# Patient Record
Sex: FEMALE | Race: BLACK OR AFRICAN AMERICAN | NOT HISPANIC OR LATINO | Employment: OTHER | URBAN - METROPOLITAN AREA
[De-identification: names, ages, dates, MRNs, and addresses within clinical notes are randomized per-mention and may not be internally consistent; named-entity substitution may affect disease eponyms.]

---

## 2017-06-14 ENCOUNTER — HOSPITAL ENCOUNTER (EMERGENCY)
Facility: HOSPITAL | Age: 72
Discharge: HOME | End: 2017-06-14
Attending: EMERGENCY MEDICINE
Payer: MEDICARE

## 2017-06-14 VITALS
DIASTOLIC BLOOD PRESSURE: 69 MMHG | WEIGHT: 270 LBS | HEIGHT: 65 IN | HEART RATE: 81 BPM | SYSTOLIC BLOOD PRESSURE: 140 MMHG | OXYGEN SATURATION: 98 % | TEMPERATURE: 98 F | BODY MASS INDEX: 44.98 KG/M2 | RESPIRATION RATE: 18 BRPM

## 2017-06-14 DIAGNOSIS — R52 PAIN: ICD-10-CM

## 2017-06-14 DIAGNOSIS — M17.12 OSTEOARTHRITIS OF LEFT KNEE, UNSPECIFIED OSTEOARTHRITIS TYPE: Primary | ICD-10-CM

## 2017-06-14 PROCEDURE — 25000003 PHARM REV CODE 250: Performed by: EMERGENCY MEDICINE

## 2017-06-14 PROCEDURE — 99283 EMERGENCY DEPT VISIT LOW MDM: CPT

## 2017-06-14 RX ORDER — HYDROCODONE BITARTRATE AND ACETAMINOPHEN 10; 325 MG/1; MG/1
1 TABLET ORAL
Status: COMPLETED | OUTPATIENT
Start: 2017-06-14 | End: 2017-06-14

## 2017-06-14 RX ORDER — TRAMADOL HYDROCHLORIDE 50 MG/1
50 TABLET ORAL EVERY 6 HOURS PRN
Qty: 12 TABLET | Refills: 0 | Status: SHIPPED | OUTPATIENT
Start: 2017-06-14 | End: 2017-06-24

## 2017-06-14 RX ORDER — PROMETHAZINE HYDROCHLORIDE 25 MG/1
25 TABLET ORAL
Status: COMPLETED | OUTPATIENT
Start: 2017-06-14 | End: 2017-06-14

## 2017-06-14 RX ADMIN — PROMETHAZINE HYDROCHLORIDE 25 MG: 25 TABLET ORAL at 06:06

## 2017-06-14 RX ADMIN — HYDROCODONE BITARTRATE AND ACETAMINOPHEN 1 TABLET: 10; 325 TABLET ORAL at 06:06

## 2017-06-14 NOTE — ED PROVIDER NOTES
SCRIBE #1 NOTE: I, Marilu Stephens, am scribing for, and in the presence of, Jovanni Frost Jr., MD. I have scribed the entire note.      History      Chief Complaint   Patient presents with    Leg Pain     L leg pain/swelling. Denies injury. Denies SOB.      Review of patient's allergies indicates:  No Known Allergies     HPI   HPI    6/14/2017, 6:51 PM   History obtained from the patient      History of Present Illness: Dilia Talley is a 70 y.o. female patient who presents to the Emergency Department for knee pain which onset gradually yesterday. Pain is located to the left knee and radiates down the left lower leg. Pt denies any injury. Sx have been constant and moderate in severity.  No modifying factors noted. No associated sx included. Pt denies any fever, chills, joint swelling, decreased ROM, or paresthesias. No further complaints at this time.     Arrival mode: Personal vehicle      PCP: Leif Townsend MD     Past Medical History:  Past Medical History:   Diagnosis Date    Diabetes     HTN (hypertension)     Neuropathy     Obese      Past Surgical History:  Past Surgical History:   Procedure Laterality Date    UMBILICAL HERNIA REPAIR         Family History:  Family History   Problem Relation Age of Onset    Diabetes Sister     Hypertension Sister      Social History:  Social History     Social History Main Topics    Smoking status: Never Smoker    Smokeless tobacco: Unknown     Alcohol use No    Drug use: No    Sexual activity: Not Currently     Partners: Male     Birth control/ protection: None     ROS   Review of Systems   Constitutional: Negative for chills, diaphoresis and fever.   HENT: Negative for sore throat.    Respiratory: Negative for shortness of breath.    Cardiovascular: Negative for chest pain.   Gastrointestinal: Negative for nausea and vomiting.   Genitourinary: Negative for dysuria.   Musculoskeletal: Positive for arthralgias (left knee). Negative for back pain, joint swelling,  "neck pain and neck stiffness.        (-) decreased ROM of LLE   Skin: Negative for rash.   Neurological: Negative for dizziness, weakness, light-headedness, numbness and headaches.        (-) paresthesias   Hematological: Does not bruise/bleed easily.     Physical Exam      Initial Vitals [06/14/17 1837]   BP Pulse Resp Temp SpO2   139/82 96 18 98 °F (36.7 °C) 96 %      Physical Exam  Nursing Notes and Vital Signs Reviewed.  Constitutional: Patient is in no acute distress. Well-developed and well-nourished.  Head: Atraumatic. Normocephalic.  Eyes: PERRL. EOM intact. Conjunctivae are not pale. No scleral icterus.  ENT: Mucous membranes are moist. Oropharynx is clear and symmetric.    Neck: Supple. Full ROM. No lymphadenopathy.  Cardiovascular: Regular rate. Regular rhythm. No murmurs, rubs, or gallops. Distal pulses are 2+ and symmetric.  Pulmonary/Chest: No respiratory distress. Clear to auscultation bilaterally. No wheezing, rales, or rhonchi.  Musculoskeletal: Moves all extremities. No obvious deformities. Severe tenderness to the left knee. No calf tenderness.    Skin: Warm and dry. Mild warmth to the left knee; not hot to touch.   Neurological:  Alert, awake, and appropriate.  Normal speech.  No acute focal neurological deficits are appreciated.  Psychiatric: Normal affect. Good eye contact. Appropriate in content.    ED Course    Procedures  ED Vital Signs:  Vitals:    06/14/17 1837   BP: 139/82   Pulse: 96   Resp: 18   Temp: 98 °F (36.7 °C)   TempSrc: Oral   SpO2: 96%   Weight: 122.5 kg (270 lb)   Height: 5' 5" (1.651 m)     Imaging Results:  Imaging Results          X-Ray Knee Complete 4 or more Views Left (Final result)  Result time 06/14/17 19:43:08   Procedure changed from X-Ray Knee 3 View Left     Final result by José Miguel Guzman MD (06/14/17 19:43:08)                 Impression:     Advanced osteoarthritis with small joint effusion.      Electronically signed by: JOSÉ MIGUEL GUZMAN MD  Date: "     06/14/17  Time:    19:43              Narrative:    Left knee xray series: 4 views    History: Left knee pain      Findings: No fracture or dislocation.  Advanced tricompartmental osteoarthritis with bone-on-bone contact medially.  There is a suprapatellar joint effusion.                                  The Emergency Provider reviewed the vital signs and test results, which are outlined above.    ED Discussion     8:00 PM: Reassessed pt at this time.  Discussed with pt all pertinent ED information and results. Discussed pt dx  and plan of tx. Gave pt all f/u and return to the ED instructions. All questions and concerns were addressed at this time. Pt expresses understanding of information and instructions, and is comfortable with plan to discharge. Pt is stable for discharge.    ED Medication(s):  Medications   hydrocodone-acetaminophen 10-325mg per tablet 1 tablet (1 tablet Oral Given 6/14/17 1858)   promethazine tablet 25 mg (25 mg Oral Given 6/14/17 1857)     New Prescriptions    TRAMADOL (ULTRAM) 50 MG TABLET    Take 1 tablet (50 mg total) by mouth every 6 (six) hours as needed.       Follow-up Information     Leif Townsend MD. Call in 2 days.    Specialty:  Family Medicine  Contact information:  [address] [address]  [phone]                 Medical Decision Making    Medical Decision Making:   Clinical Tests:   Radiological Study: Reviewed and Ordered         Scribe Attestation:   Scribe #1: I performed the above scribed service and the documentation accurately describes the services I performed. I attest to the accuracy of the note.    Attending:   Physician Attestation Statement for Scribe #1: I, Jovanni Frost Jr., MD, personally performed the services described in this documentation, as scribed by Marilu Stephens, in my presence, and it is both accurate and complete.      Clinical Impression       ICD-10-CM ICD-9-CM   1. Osteoarthritis of left knee, unspecified  osteoarthritis type M17.12 715.96   2. Pain R52 780.96       Disposition:   Disposition: Discharged  Condition: Stable         Jovanni Frost Jr., MD  06/14/17 2014

## 2017-06-14 NOTE — ED NOTES
Patient identifiers verified and correct for Dilia Talley.    LOC: The patient is awake, alert and aware of environment with an appropriate affect, the patient is oriented x 3 and speaking appropriately.  APPEARANCE: Patient resting comfortably and in no acute distress, patient is clean and well groomed, patient's clothing is properly fastened.  SKIN: The skin is warm and dry, color consistent with ethnicity, patient has normal skin turgor and moist mucus membranes, skin intact, no breakdown or bruising noted.  MUSCULOSKELETAL: L knee pain and swelling reported. Denies injury. Pain upon ambulation  RESPIRATORY: Airway is open and patent, respirations are spontaneous.  CARDIAC: Patient has a normal rate, no periphreal edema noted, capillary refill < 3 seconds.  ABDOMEN: Soft and non tender to palpation.

## 2017-11-07 ENCOUNTER — HOSPITAL ENCOUNTER (EMERGENCY)
Facility: HOSPITAL | Age: 72
Discharge: HOME | End: 2017-11-07
Attending: EMERGENCY MEDICINE
Payer: MEDICARE

## 2017-11-07 VITALS
SYSTOLIC BLOOD PRESSURE: 155 MMHG | HEART RATE: 78 BPM | RESPIRATION RATE: 20 BRPM | DIASTOLIC BLOOD PRESSURE: 98 MMHG | OXYGEN SATURATION: 97 % | TEMPERATURE: 98 F

## 2017-11-07 DIAGNOSIS — M17.12 OSTEOARTHRITIS OF LEFT KNEE, UNSPECIFIED OSTEOARTHRITIS TYPE: Primary | ICD-10-CM

## 2017-11-07 PROCEDURE — 99283 EMERGENCY DEPT VISIT LOW MDM: CPT

## 2017-11-07 PROCEDURE — 25000003 PHARM REV CODE 250: Performed by: EMERGENCY MEDICINE

## 2017-11-07 RX ORDER — MELOXICAM 7.5 MG/1
7.5 TABLET ORAL DAILY
Qty: 30 TABLET | Refills: 0 | OUTPATIENT
Start: 2017-11-07 | End: 2020-01-10

## 2017-11-07 RX ORDER — HYDROCODONE BITARTRATE AND ACETAMINOPHEN 7.5; 325 MG/1; MG/1
1 TABLET ORAL EVERY 8 HOURS PRN
Qty: 15 TABLET | Refills: 0 | OUTPATIENT
Start: 2017-11-07 | End: 2019-07-13

## 2017-11-07 RX ORDER — MELOXICAM 7.5 MG/1
7.5 TABLET ORAL ONCE
Status: COMPLETED | OUTPATIENT
Start: 2017-11-07 | End: 2017-11-07

## 2017-11-07 RX ORDER — HYDROCODONE BITARTRATE AND ACETAMINOPHEN 10; 325 MG/1; MG/1
1 TABLET ORAL
Status: COMPLETED | OUTPATIENT
Start: 2017-11-07 | End: 2017-11-07

## 2017-11-07 RX ORDER — MELOXICAM 7.5 MG/1
7.5 TABLET ORAL DAILY
Status: DISCONTINUED | OUTPATIENT
Start: 2017-11-08 | End: 2017-11-07

## 2017-11-07 RX ADMIN — HYDROCODONE BITARTRATE AND ACETAMINOPHEN 1 TABLET: 10; 325 TABLET ORAL at 09:11

## 2017-11-07 RX ADMIN — MELOXICAM 7.5 MG: 7.5 TABLET ORAL at 09:11

## 2017-11-08 NOTE — ED PROVIDER NOTES
SCRIBE #1 NOTE: I, Juan Ramon Adamsne Joaquina, am scribing for, and in the presence of, Eunice Lorenzo MD. I have scribed the entire note.      History      Chief Complaint   Patient presents with    Knee Pain     left knee pain, denies injury       Review of patient's allergies indicates:  No Known Allergies     HPI   HPI    11/7/2017, 9:06 PM   History obtained from the patient and son      History of Present Illness: Dilia Talley is a 71 y.o. female patient who presents to the Emergency Department for left knee pain which onset gradually this morning. Sxs are constant and moderate in severity. Pt reports she has chronic knee pain but sxs worsened this morning.There are no mitigating or exacerbating factors noted. Associated sxs include left knee swelling, difficulty walking.  Pt denies any fever, N/V/D, numbness, calf pain, CP, fall, trauma, and all other sxs at this time. No further complaints or concerns at this time.         Arrival mode: Personal vehicle      PCP: Leif Townsend MD       Past Medical History:  Past Medical History:   Diagnosis Date    Diabetes     HTN (hypertension)     Neuropathy     Obese        Past Surgical History:  Past Surgical History:   Procedure Laterality Date    UMBILICAL HERNIA REPAIR           Family History:  Family History   Problem Relation Age of Onset    Diabetes Sister     Hypertension Sister        Social History:  Social History     Social History Main Topics    Smoking status: Never Smoker    Smokeless tobacco: unknown    Alcohol use No    Drug use: No    Sexual activity: Not Currently     Partners: Male     Birth control/ protection: None       ROS   Review of Systems   Constitutional: Negative for fever.   HENT: Negative for sore throat.    Respiratory: Negative for shortness of breath.    Cardiovascular: Negative for chest pain.   Gastrointestinal: Negative for nausea and vomiting.   Genitourinary: Negative for dysuria.   Musculoskeletal: Positive for  arthralgias (left knee), gait problem (difficulty) and joint swelling (left knee). Negative for back pain.   Skin: Negative for rash.   Neurological: Negative for weakness.   Hematological: Does not bruise/bleed easily.       Physical Exam      Initial Vitals [11/07/17 1940]   BP Pulse Resp Temp SpO2   (!) 148/105 80 18 97.8 °F (36.6 °C) 99 %      MAP       119.33          Physical Exam  Nursing Notes and Vital Signs Reviewed.  Constitutional: Patient is in no acute distress. Well-developed and well-nourished.  Head: Atraumatic. Normocephalic.  Eyes: PERRL. EOM intact. Conjunctivae are not pale. No scleral icterus.  ENT: Mucous membranes are moist. Oropharynx is clear and symmetric.    Neck: Supple. Full ROM. No lymphadenopathy.  Cardiovascular: Regular rate. Regular rhythm. No murmurs, rubs, or gallops. Distal pulses are 2+ and symmetric.  Pulmonary/Chest: No respiratory distress. Clear to auscultation bilaterally. No wheezing, rales, or rhonchi.  Abdominal: Soft and non-distended.  There is no tenderness.  No rebound, guarding, or rigidity. Good bowel sounds.  Genitourinary: No CVA tenderness  Musculoskeletal: Moves all extremities. No obvious deformities. No edema. No calf tenderness.  Left Knee:  No obvious deformity. There is mild swelling.  There is pain with ROM.  No increased warmth, erythema, induration or fluctuance.  No ligament laxity. DP and PT pulses are 2+.  Normal capillary refill.  Distal sensation is intact.  Skin: Warm and dry.  Neurological:  Alert, awake, and appropriate.  Normal speech.  No acute focal neurological deficits are appreciated.  Psychiatric: Normal affect. Good eye contact. Appropriate in content.    ED Course    Procedures  ED Vital Signs:  Vitals:    11/07/17 1940 11/07/17 2152   BP: (!) 148/105 (!) 155/98   Pulse: 80 78   Resp: 18 20   Temp: 97.8 °F (36.6 °C)    TempSrc: Oral    SpO2: 99% 97%               The Emergency Provider reviewed the vital signs and test results, which  are outlined above.    ED Discussion     9:31 PM Discussed plan of treatment with pt. Gave pt all f/u and return to the ED instructions. All questions and concerns were addressed at this time. Pt understands and agrees to plan as discussed. Pt is stable for discharge.       ED Medication(s):  Medications   hydrocodone-acetaminophen 10-325mg per tablet 1 tablet (1 tablet Oral Given 11/7/17 2129)   meloxicam tablet 7.5 mg (7.5 mg Oral Given 11/7/17 8585)       Discharge Medication List as of 11/7/2017  9:12 PM      START taking these medications    Details   hydrocodone-acetaminophen 7.5-325mg (NORCO) 7.5-325 mg per tablet Take 1 tablet by mouth every 8 (eight) hours as needed for Pain., Starting Tue 11/7/2017, Print      meloxicam (MOBIC) 7.5 MG tablet Take 1 tablet (7.5 mg total) by mouth once daily., Starting Tue 11/7/2017, Print             Follow-up Information     Leif Townsend MD In 1 week.    Specialty:  Family Medicine  Why:  Can return to the ER, If symptoms worsen  Contact information:  [address] [address]  [phone]                     Medical Decision Making              Scribe Attestation:   Scribe #1: I performed the above scribed service and the documentation accurately describes the services I performed. I attest to the accuracy of the note.    Attending:   Physician Attestation Statement for Scribe #1: I, Eunice Lorenzo MD, personally performed the services described in this documentation, as scribed by Juan Ramon Kunz, in my presence, and it is both accurate and complete.          Clinical Impression       ICD-10-CM ICD-9-CM   1. Osteoarthritis of left knee, unspecified osteoarthritis type M17.12 715.96       Disposition:   Disposition: Discharged  Condition: Stable         Eunice Lorenzo MD  11/08/17 0218

## 2018-03-06 ENCOUNTER — HOSPITAL ENCOUNTER (EMERGENCY)
Facility: HOSPITAL | Age: 73
Discharge: HOME | End: 2018-03-06
Attending: EMERGENCY MEDICINE
Payer: MEDICARE

## 2018-03-06 VITALS
TEMPERATURE: 99 F | SYSTOLIC BLOOD PRESSURE: 119 MMHG | HEART RATE: 58 BPM | DIASTOLIC BLOOD PRESSURE: 68 MMHG | OXYGEN SATURATION: 97 % | RESPIRATION RATE: 18 BRPM | HEIGHT: 66 IN

## 2018-03-06 DIAGNOSIS — M54.31 SCIATICA OF RIGHT SIDE: Primary | ICD-10-CM

## 2018-03-06 PROCEDURE — 99283 EMERGENCY DEPT VISIT LOW MDM: CPT | Mod: 25

## 2018-03-06 PROCEDURE — 96372 THER/PROPH/DIAG INJ SC/IM: CPT

## 2018-03-06 PROCEDURE — 63600175 PHARM REV CODE 636 W HCPCS: Performed by: EMERGENCY MEDICINE

## 2018-03-06 RX ORDER — KETOROLAC TROMETHAMINE 30 MG/ML
30 INJECTION, SOLUTION INTRAMUSCULAR; INTRAVENOUS
Status: COMPLETED | OUTPATIENT
Start: 2018-03-06 | End: 2018-03-06

## 2018-03-06 RX ADMIN — KETOROLAC TROMETHAMINE 30 MG: 30 INJECTION, SOLUTION INTRAMUSCULAR; INTRAVENOUS at 04:03

## 2018-03-06 NOTE — ED PROVIDER NOTES
SCRIBE #1 NOTE: I, Ricardo Abdul, am scribing for, and in the presence of, No att. providers found. I have scribed the entire note.      History      Chief Complaint   Patient presents with    Leg Pain     pain in R hip/buttocks that radiates down R leg       Review of patient's allergies indicates:  No Known Allergies     HPI   HPI    3/6/2018, 4:20 AM   History obtained from the patient      History of Present Illness: Dilia Talley is a 71 y.o. female patient who presents to the Emergency Department for an valuation of right leg pain which onset suddenly today. Symptoms are constant and moderate in severity. Exacerbated by bearing weigh/ movement and relieved by nothing. Associated sxs include right hip pain. Patient denies any fever, chills, weakness/numbness, N/V, fall, back pain, neck pain, trauma, and all other sxs at this time. No further complaints or concerns at this time.     Arrival mode: Personal vehicle    PCP: Leif Townsend MD       Past Medical History:  Past Medical History:   Diagnosis Date    Diabetes     HTN (hypertension)     Neuropathy     Obese        Past Surgical History:  Past Surgical History:   Procedure Laterality Date    UMBILICAL HERNIA REPAIR           Family History:  Family History   Problem Relation Age of Onset    Diabetes Sister     Hypertension Sister        Social History:  Social History     Social History Main Topics    Smoking status: Never Smoker    Smokeless tobacco: Unknown    Alcohol use No    Drug use: No    Sexual activity: Not Currently     Partners: Male     Birth control/ protection: None       ROS   Review of Systems   Constitutional: Negative for chills and fever.        (-) Trauma  (-) Fall   HENT: Negative for congestion and sore throat.    Respiratory: Negative for cough and shortness of breath.    Cardiovascular: Negative for chest pain.   Gastrointestinal: Negative for abdominal pain, nausea and vomiting.   Genitourinary: Negative for dysuria,  "frequency, hematuria and urgency.   Musculoskeletal: Negative for back pain, gait problem and neck pain.        (+) Right hip pain with radiation down leg  (+) Right leg pain   Skin: Negative for rash.   Neurological: Negative for weakness.   Hematological: Does not bruise/bleed easily.     Physical Exam      Initial Vitals [03/06/18 0414]   BP Pulse Resp Temp SpO2   (!) 145/74 60 20 98.8 °F (37.1 °C) 97 %      MAP       97.67          Physical Exam  Nursing Notes and Vital Signs Reviewed.  Constitutional: Patient is in no acute distress. Well-developed and well-nourished.  Head: Atraumatic. Normocephalic.  Eyes: PERRL. EOM intact. Conjunctivae are not pale. No scleral icterus.  ENT: Mucous membranes are moist. Oropharynx is clear and symmetric.    Neck: Supple. Full ROM. No lymphadenopathy.  Cardiovascular: Regular rate. Regular rhythm.  Pulmonary/Chest: No respiratory distress. Clear to auscultation bilaterally. No wheezing or rales.  Abdominal: Soft and non-distended.  There is no tenderness.  Musculoskeletal: Moves all extremities. No obvious deformities. No edema. No calf tenderness. Right hip pain with ROM.   Skin: Warm and dry.  Neurological:  Alert, awake, and appropriate.  Normal speech.  No acute focal neurological deficits are appreciated.  Psychiatric: Normal affect. Good eye contact. Appropriate in content.    ED Course    Procedures  ED Vital Signs:  Vitals:    03/06/18 0414 03/06/18 0534   BP: (!) 145/74 119/68   Pulse: 60 (!) 58   Resp: 20 18   Temp: 98.8 °F (37.1 °C)    TempSrc: Oral    SpO2: 97% 97%   Height: 5' 6" (1.676 m)        Imaging Results:    Per ED physician, pt's XR results: Lumbar spine: NAF.              The Emergency Provider reviewed the vital signs and test results, which are outlined above.    ED Discussion     5:22 AM: Reassessed pt at this time. Pt is awake, alert, and in NAD. Pt states her condition has improved at this time. Discussed with pt all pertinent ED information and " results. Discussed pt dx and plan of tx. Gave pt all f/u and return to the ED instructions. All questions and concerns were addressed at this time. Pt expresses understanding of information and instructions, and is comfortable with plan to discharge. Pt is stable for discharge.    I discussed with patient and/or family/caretaker that evaluation in the ED does not suggest any emergent or life threatening medical conditions requiring immediate intervention beyond what was provided in the ED, and I believe patient is safe for discharge.  Regardless, an unremarkable evaluation in the ED does not preclude the development or presence of a serious of life threatening condition. As such, patient was instructed to return immediately for any worsening or change in current symptoms.      ED Medication(s):  Medications   ketorolac injection 30 mg (30 mg Intramuscular Given 3/6/18 0445)       Discharge Medication List as of 3/6/2018  5:18 AM          Follow-up Information     Leif Townsend MD In 2 days.    Specialty:  Family Medicine  Contact information:  [address] [address]  [phone]             [facility]    Specialty:  Emergency Medicine  Why:  As needed  Contact information:  [address] [facility] [address] [phone]                   Medical Decision Making    Medical Decision Making:   Clinical Tests:   Radiological Study: Ordered and Reviewed           Scribe Attestation:   Scribe #1: I performed the above scribed service and the documentation accurately describes the services I performed. I attest to the accuracy of the note.    Attending:   Physician Attestation Statement for Scribe #1: I, Ricardo Abdul MD, personally performed the services described in this documentation, as scribed by Chance Panda, in my presence, and it is both accurate and complete.          Clinical Impression       ICD-10-CM ICD-9-CM   1. Sciatica of right side  M54.31 724.3       Disposition:   Disposition: Discharged  Condition: Stable         Ricardo Abdul MD  03/06/18 0556

## 2018-03-06 NOTE — ED NOTES
"Patient medically cleared for discharge by Dr. Abdul.  No adverse side effects noted post IM injection.  Patient states "It feels so much better."  "

## 2018-03-07 ENCOUNTER — PES CALL (OUTPATIENT)
Dept: ADMINISTRATIVE | Facility: CLINIC | Age: 73
End: 2018-03-07

## 2019-07-13 ENCOUNTER — HOSPITAL ENCOUNTER (EMERGENCY)
Facility: HOSPITAL | Age: 74
Discharge: HOME | End: 2019-07-13
Attending: EMERGENCY MEDICINE
Payer: MEDICARE

## 2019-07-13 VITALS
HEART RATE: 68 BPM | BODY MASS INDEX: 46.95 KG/M2 | RESPIRATION RATE: 16 BRPM | SYSTOLIC BLOOD PRESSURE: 130 MMHG | DIASTOLIC BLOOD PRESSURE: 71 MMHG | TEMPERATURE: 98 F | WEIGHT: 281.81 LBS | HEIGHT: 65 IN | OXYGEN SATURATION: 96 %

## 2019-07-13 DIAGNOSIS — M25.571 ANKLE PAIN, RIGHT: ICD-10-CM

## 2019-07-13 DIAGNOSIS — L03.115 CELLULITIS OF RIGHT FOOT: ICD-10-CM

## 2019-07-13 DIAGNOSIS — M13.871 ALLERGIC ARTHRITIS OF RIGHT ANKLE: Primary | ICD-10-CM

## 2019-07-13 DIAGNOSIS — M79.604 RIGHT LEG PAIN: ICD-10-CM

## 2019-07-13 LAB
ALBUMIN SERPL BCP-MCNC: 3.2 G/DL (ref 3.5–5.2)
ALP SERPL-CCNC: 84 U/L (ref 55–135)
ALT SERPL W/O P-5'-P-CCNC: 9 U/L (ref 10–44)
ANION GAP SERPL CALC-SCNC: 11 MMOL/L (ref 8–16)
APTT BLDCRRT: 23.9 SEC (ref 21–32)
AST SERPL-CCNC: 10 U/L (ref 10–40)
BASOPHILS # BLD AUTO: 0.02 K/UL (ref 0–0.2)
BASOPHILS NFR BLD: 0.2 % (ref 0–1.9)
BILIRUB SERPL-MCNC: 0.6 MG/DL (ref 0.1–1)
BUN SERPL-MCNC: 16 MG/DL (ref 8–23)
CALCIUM SERPL-MCNC: 10.5 MG/DL (ref 8.7–10.5)
CHLORIDE SERPL-SCNC: 101 MMOL/L (ref 95–110)
CO2 SERPL-SCNC: 26 MMOL/L (ref 23–29)
CREAT SERPL-MCNC: 1.2 MG/DL (ref 0.5–1.4)
DIFFERENTIAL METHOD: ABNORMAL
EOSINOPHIL # BLD AUTO: 0.4 K/UL (ref 0–0.5)
EOSINOPHIL NFR BLD: 4.1 % (ref 0–8)
ERYTHROCYTE [DISTWIDTH] IN BLOOD BY AUTOMATED COUNT: 13.5 % (ref 11.5–14.5)
EST. GFR  (AFRICAN AMERICAN): 51 ML/MIN/1.73 M^2
EST. GFR  (NON AFRICAN AMERICAN): 45 ML/MIN/1.73 M^2
GLUCOSE SERPL-MCNC: 107 MG/DL (ref 70–110)
HCT VFR BLD AUTO: 33.7 % (ref 37–48.5)
HGB BLD-MCNC: 11.4 G/DL (ref 12–16)
INR PPP: 0.9 (ref 0.8–1.2)
LYMPHOCYTES # BLD AUTO: 2.3 K/UL (ref 1–4.8)
LYMPHOCYTES NFR BLD: 26.7 % (ref 18–48)
MCH RBC QN AUTO: 30 PG (ref 27–31)
MCHC RBC AUTO-ENTMCNC: 33.8 G/DL (ref 32–36)
MCV RBC AUTO: 89 FL (ref 82–98)
MONOCYTES # BLD AUTO: 0.8 K/UL (ref 0.3–1)
MONOCYTES NFR BLD: 9.5 % (ref 4–15)
NEUTROPHILS # BLD AUTO: 5.2 K/UL (ref 1.8–7.7)
NEUTROPHILS NFR BLD: 59.7 % (ref 38–73)
PLATELET # BLD AUTO: 324 K/UL (ref 150–350)
PMV BLD AUTO: 9.1 FL (ref 9.2–12.9)
POTASSIUM SERPL-SCNC: 4 MMOL/L (ref 3.5–5.1)
PROT SERPL-MCNC: 7.7 G/DL (ref 6–8.4)
PROTHROMBIN TIME: 9.7 SEC (ref 9–12.5)
RBC # BLD AUTO: 3.8 M/UL (ref 4–5.4)
SODIUM SERPL-SCNC: 138 MMOL/L (ref 136–145)
URATE SERPL-MCNC: 13.2 MG/DL (ref 2.4–5.7)
WBC # BLD AUTO: 8.78 K/UL (ref 3.9–12.7)

## 2019-07-13 PROCEDURE — 85730 THROMBOPLASTIN TIME PARTIAL: CPT

## 2019-07-13 PROCEDURE — 25000003 PHARM REV CODE 250: Performed by: EMERGENCY MEDICINE

## 2019-07-13 PROCEDURE — 84550 ASSAY OF BLOOD/URIC ACID: CPT

## 2019-07-13 PROCEDURE — 85610 PROTHROMBIN TIME: CPT

## 2019-07-13 PROCEDURE — 99283 EMERGENCY DEPT VISIT LOW MDM: CPT | Mod: 25

## 2019-07-13 PROCEDURE — 80053 COMPREHEN METABOLIC PANEL: CPT

## 2019-07-13 PROCEDURE — 85025 COMPLETE CBC W/AUTO DIFF WBC: CPT

## 2019-07-13 RX ORDER — HYDROCODONE BITARTRATE AND ACETAMINOPHEN 10; 325 MG/1; MG/1
1 TABLET ORAL EVERY 6 HOURS PRN
Qty: 15 TABLET | Refills: 0 | OUTPATIENT
Start: 2019-07-13 | End: 2019-08-07

## 2019-07-13 RX ORDER — HYDROCODONE BITARTRATE AND ACETAMINOPHEN 10; 325 MG/1; MG/1
1 TABLET ORAL
Status: COMPLETED | OUTPATIENT
Start: 2019-07-13 | End: 2019-07-13

## 2019-07-13 RX ORDER — CEPHALEXIN 500 MG/1
500 CAPSULE ORAL EVERY 8 HOURS
Qty: 1 CAPSULE | Refills: 0 | Status: SHIPPED | OUTPATIENT
Start: 2019-07-13 | End: 2019-07-20

## 2019-07-13 RX ADMIN — HYDROCODONE BITARTRATE AND ACETAMINOPHEN 1 TABLET: 10; 325 TABLET ORAL at 07:07

## 2019-07-14 NOTE — ED PROVIDER NOTES
SCRIBE #1 NOTE: I, Carly Zackery, am scribing for, and in the presence of, Yonas Graham Jr., MD. I have scribed the entire note.       History     Chief Complaint   Patient presents with    Leg Swelling     pt reports her feet staying really swollen but recently the Right foot has been hurting and swelling more than the Left      Review of patient's allergies indicates:  No Known Allergies      History of Present Illness     HPI    7/13/2019, 7:24 PM  History obtained from the patient      History of Present Illness: Dilia Talley is a 72 y.o. female patient with a PMHx of HTN, DM, neuropathy, and arthritis who presents to the Emergency Department for evaluation of bilateral leg swelling which onset gradually a few weeks ago. Symptoms are constant and moderate in severity. No mitigating or exacerbating factors reported. Associated sxs include R leg pain. Patient denies any fever, chills, cough, SOB, wheezing, CP, palpitations, n/v, gait problem, dizziness, extremity weakness/numbness, and all other sxs at this time. No prior Tx reported. No further complaints or concerns at this time.       Arrival mode: Personal vehicle    PCP: Leif Townsend MD        Past Medical History:  Past Medical History:   Diagnosis Date    Diabetes     HTN (hypertension)     Neuropathy     Obese        Past Surgical History:  Past Surgical History:   Procedure Laterality Date    UMBILICAL HERNIA REPAIR           Family History:  Family History   Problem Relation Age of Onset    Diabetes Sister     Hypertension Sister        Social History:  Social History     Tobacco Use    Smoking status: Never Smoker   Substance and Sexual Activity    Alcohol use: No    Drug use: No    Sexual activity: Not Currently     Partners: Male     Birth control/protection: None        Review of Systems     Review of Systems   Constitutional: Negative for chills and fever.   HENT: Negative for rhinorrhea and sore throat.    Respiratory: Negative for  "cough, shortness of breath and wheezing.    Cardiovascular: Positive for leg swelling (bilateral). Negative for chest pain and palpitations.   Gastrointestinal: Negative for diarrhea, nausea and vomiting.   Genitourinary: Negative for dysuria, frequency and urgency.   Musculoskeletal: Negative for back pain and gait problem.        (+) R leg pain   Skin: Negative for rash.   Neurological: Negative for dizziness, weakness and numbness.   Hematological: Does not bruise/bleed easily.   All other systems reviewed and are negative.       Physical Exam     Initial Vitals [07/13/19 1835]   BP Pulse Resp Temp SpO2   135/75 78 18 98 °F (36.7 °C) 99 %      MAP       --          Physical Exam  Nursing Notes and Vital Signs Reviewed.  Constitutional: Patient is in no acute distress. Well-developed and well-nourished.  Head: Atraumatic. Normocephalic.  Cardiovascular: Regular rate. Regular rhythm. No murmurs, rubs, or gallops. Distal pulses are 2+ and symmetric.  Musculoskeletal: Moves all extremities. No obvious deformities. Bilateral edema. No calf tenderness.  Right Foot:  No obvious deformity. R ankle warmth and tenderness. R foot swelling. Dystrophic nails with fungal infection. Ankle dorsiflexion and ankle plantar flexion are intact.  Intact sensation to light touch. Distal capillary refill takes less than 2 seconds.  PT and DP pulses are 2+ bilaterally.  Skin: Warm and dry.  Neurological: Patient is alert and oriented to person, place and time. No acute focal neurological deficits noted.  Psychiatric: Normal affect. Good eye contact. Appropriate in content.     ED Course   Procedures  ED Vital Signs:  Vitals:    07/13/19 1835 07/13/19 1900 07/13/19 1910 07/13/19 2000   BP: 135/75 137/64  132/75   Pulse: 78 72  77   Resp: 18 17  17   Temp: 98 °F (36.7 °C)      TempSrc: Oral      SpO2: 99% 98%  99%   Weight:   127.8 kg (281 lb 12.8 oz)    Height: 5' 5" (1.651 m)       07/13/19 2035   BP: 130/71   Pulse: 68   Resp: 16 "   Temp: 98.1 °F (36.7 °C)   TempSrc: Oral   SpO2: 96%   Weight:    Height:        Abnormal Lab Results:  Labs Reviewed   CBC W/ AUTO DIFFERENTIAL - Abnormal; Notable for the following components:       Result Value    RBC 3.80 (*)     Hemoglobin 11.4 (*)     Hematocrit 33.7 (*)     MPV 9.1 (*)     All other components within normal limits   COMPREHENSIVE METABOLIC PANEL - Abnormal; Notable for the following components:    Albumin 3.2 (*)     ALT 9 (*)     eGFR if  51 (*)     eGFR if non  45 (*)     All other components within normal limits   URIC ACID - Abnormal; Notable for the following components:    Uric Acid 13.2 (*)     All other components within normal limits   PROTIME-INR   APTT       Lab Results:  Results for orders placed or performed during the hospital encounter of 07/13/19   CBC auto differential   Result Value Ref Range    WBC 8.78 3.90 - 12.70 K/uL    RBC 3.80 (L) 4.00 - 5.40 M/uL    Hemoglobin 11.4 (L) 12.0 - 16.0 g/dL    Hematocrit 33.7 (L) 37.0 - 48.5 %    Mean Corpuscular Volume 89 82 - 98 fL    Mean Corpuscular Hemoglobin 30.0 27.0 - 31.0 pg    Mean Corpuscular Hemoglobin Conc 33.8 32.0 - 36.0 g/dL    RDW 13.5 11.5 - 14.5 %    Platelets 324 150 - 350 K/uL    MPV 9.1 (L) 9.2 - 12.9 fL    Gran # (ANC) 5.2 1.8 - 7.7 K/uL    Lymph # 2.3 1.0 - 4.8 K/uL    Mono # 0.8 0.3 - 1.0 K/uL    Eos # 0.4 0.0 - 0.5 K/uL    Baso # 0.02 0.00 - 0.20 K/uL    Gran% 59.7 38.0 - 73.0 %    Lymph% 26.7 18.0 - 48.0 %    Mono% 9.5 4.0 - 15.0 %    Eosinophil% 4.1 0.0 - 8.0 %    Basophil% 0.2 0.0 - 1.9 %    Differential Method Automated    Comprehensive metabolic panel   Result Value Ref Range    Sodium 138 136 - 145 mmol/L    Potassium 4.0 3.5 - 5.1 mmol/L    Chloride 101 95 - 110 mmol/L    CO2 26 23 - 29 mmol/L    Glucose 107 70 - 110 mg/dL    BUN, Bld 16 8 - 23 mg/dL    Creatinine 1.2 0.5 - 1.4 mg/dL    Calcium 10.5 8.7 - 10.5 mg/dL    Total Protein 7.7 6.0 - 8.4 g/dL    Albumin 3.2 (L) 3.5  - 5.2 g/dL    Total Bilirubin 0.6 0.1 - 1.0 mg/dL    Alkaline Phosphatase 84 55 - 135 U/L    AST 10 10 - 40 U/L    ALT 9 (L) 10 - 44 U/L    Anion Gap 11 8 - 16 mmol/L    eGFR if African American 51 (A) >60 mL/min/1.73 m^2    eGFR if non African American 45 (A) >60 mL/min/1.73 m^2   Uric acid   Result Value Ref Range    Uric Acid 13.2 (H) 2.4 - 5.7 mg/dL   Protime-INR   Result Value Ref Range    Prothrombin Time 9.7 9.0 - 12.5 sec    INR 0.9 0.8 - 1.2   APTT   Result Value Ref Range    aPTT 23.9 21.0 - 32.0 sec          Imaging Results:  Imaging Results          X-Ray Ankle Complete Right (Final result)  Result time 07/13/19 20:05:38    Final result by Ar Mason MD (07/13/19 20:05:38)                 Impression:      See above      Electronically signed by: Ar Mason MD  Date:    07/13/2019  Time:    20:05             Narrative:    EXAMINATION:  XR ANKLE COMPLETE 3 VIEW RIGHT    CLINICAL HISTORY:  Pain in right ankle and joints of right foot    TECHNIQUE:  AP, lateral, and oblique images of the right ankle were performed.    COMPARISON:  None    FINDINGS:  No fracture or dislocation or joint effusion.  There is a calcaneal spur are.                               US Lower Extremity Veins Right (Final result)  Result time 07/13/19 19:55:53    Final result by Ar Mason MD (07/13/19 19:55:53)                 Impression:      Negative for right lower extremity DVT.      Electronically signed by: Ar Mason MD  Date:    07/13/2019  Time:    19:55             Narrative:    EXAMINATION:  US LOWER EXTREMITY VEINS RIGHT    CLINICAL HISTORY:  Pain in right leg    COMPARISON:  None    FINDINGS:  Grayscale and color Doppler sonography was performed through the right lower extremity.    The right lower extremity veins are widely patent with normal augmentation, and compressibility and respiratory variability.  In the popliteal fossa there is a cyst with internal debris measuring 6.4 x 1.4 cm, probably a Baker's  cyst.                                      The Emergency Provider reviewed the vital signs and test results, which are outlined above.     ED Discussion     8:31 PM: Reassessed pt at this time. Discussed with pt all pertinent ED information and results. Discussed pt dx and plan of tx. Gave pt all f/u and return to the ED instructions. All questions and concerns were addressed at this time. Pt expresses understanding of information and instructions, and is comfortable with plan to discharge. Pt is stable for discharge.    I discussed with patient and/or family/caretaker that evaluation in the ED does not suggest any emergent or life threatening medical conditions requiring immediate intervention beyond what was provided in the ED, and I believe patient is safe for discharge.  Regardless, an unremarkable evaluation in the ED does not preclude the development or presence of a serious of life threatening condition. As such, patient was instructed to return immediately for any worsening or change in current symptoms.      ED Medication(s):  Medications   HYDROcodone-acetaminophen  mg per tablet 1 tablet (1 tablet Oral Given 7/13/19 1945)       New Prescriptions    CEPHALEXIN (KEFLEX) 500 MG CAPSULE    Take 1 capsule (500 mg total) by mouth every 8 (eight) hours. for 7 days    HYDROCODONE-ACETAMINOPHEN (NORCO)  MG PER TABLET    Take 1 tablet by mouth every 6 (six) hours as needed for Pain.       Follow-up Information     Leif Townsend MD. Call in 2 days.    Specialty:  Family Medicine  Why:  to schedule appt for recheck  Contact information:  [address] [address]  [phone]                         Medical Decision Making:   Clinical Tests:   Radiological Study: Ordered and Reviewed             Scribe Attestation:   Scribe #1: I performed the above scribed service and the documentation accurately describes the services I performed. I attest to the accuracy of the note.      Attending:   Physician Attestation Statement for Scribe #1: I, Yonas Graham Jr., MD, personally performed the services described in this documentation, as scribed by Carly Vizcarra, in my presence, and it is both accurate and complete.           Clinical Impression       ICD-10-CM ICD-9-CM   1. Allergic arthritis of right ankle M13.871 716.27   2. Right leg pain M79.604 729.5   3. Ankle pain, right M25.571 719.47   4. Cellulitis of right foot L03.115 682.7       Disposition:   Disposition: Discharged  Condition: Stable         Yonas Graham Jr., MD  07/13/19 2041

## 2019-08-06 ENCOUNTER — HOSPITAL ENCOUNTER (EMERGENCY)
Facility: HOSPITAL | Age: 74
Discharge: HOME | End: 2019-08-07
Attending: EMERGENCY MEDICINE
Payer: MEDICARE

## 2019-08-06 DIAGNOSIS — M25.569 KNEE PAIN: ICD-10-CM

## 2019-08-06 DIAGNOSIS — M19.90 ARTHRITIS: Primary | ICD-10-CM

## 2019-08-06 PROCEDURE — 99283 EMERGENCY DEPT VISIT LOW MDM: CPT | Mod: 25

## 2019-08-07 VITALS
BODY MASS INDEX: 46.82 KG/M2 | SYSTOLIC BLOOD PRESSURE: 149 MMHG | OXYGEN SATURATION: 98 % | TEMPERATURE: 98 F | WEIGHT: 281 LBS | HEART RATE: 76 BPM | RESPIRATION RATE: 16 BRPM | HEIGHT: 65 IN | DIASTOLIC BLOOD PRESSURE: 63 MMHG

## 2019-08-07 RX ORDER — HYDROCODONE BITARTRATE AND ACETAMINOPHEN 7.5; 325 MG/1; MG/1
1 TABLET ORAL EVERY 8 HOURS PRN
Qty: 12 TABLET | Refills: 0 | OUTPATIENT
Start: 2019-08-07 | End: 2019-11-06

## 2019-08-07 NOTE — ED PROVIDER NOTES
SCRIBE #1 NOTE: I, Xochitl Mao, am scribing for, and in the presence of, Dang Sabillon DO. I have scribed the entire note.       History     Chief Complaint   Patient presents with    Knee Pain     pain right knee, bilateral knee swelling per EMS 100mg Fentanyl IM      Review of patient's allergies indicates:  No Known Allergies      History of Present Illness     HPI    8/6/2019, 10:34 PM  History obtained from the patient      History of Present Illness: Dilia Talley is a 72 y.o. female patient with a PMHx of HTN, Diabetes, Nueropathy who presents to the Emergency Department for evaluation of knee pain. Pt states having bilateral knee pain but is severe on the right. Pt states pain is worse when trying to bend the knee. Pt uses walker to ambulate. Prior tx per EMS includes 100mg Fetanyl IM. Pt denies chills, fever, congestion, cough, SOB, CP, leg swelling, abdominal pain, n/v/d, dysuria, back pain, joint swelling, rash, HA, trauma, calf pain, calf tenderness, numbness, weakness, and all other sxs. No further complaints or concerns at this time.         Arrival mode: EMS     PCP: Leif Townsend MD        Past Medical History:  Past Medical History:   Diagnosis Date    Diabetes     HTN (hypertension)     Neuropathy     Obese        Past Surgical History:  Past Surgical History:   Procedure Laterality Date    UMBILICAL HERNIA REPAIR           Family History:  Family History   Problem Relation Age of Onset    Diabetes Sister     Hypertension Sister        Social History:  Social History     Tobacco Use    Smoking status: Never Smoker   Substance and Sexual Activity    Alcohol use: No    Drug use: No    Sexual activity: Not Currently     Partners: Male     Birth control/protection: None        Review of Systems     Review of Systems   Constitutional: Negative for chills and fever.   HENT: Negative for congestion and sore throat.    Respiratory: Negative for cough and shortness of breath.     Cardiovascular: Negative for chest pain and leg swelling.   Gastrointestinal: Negative for abdominal pain, diarrhea, nausea and vomiting.   Genitourinary: Negative for dysuria and frequency.   Musculoskeletal: Positive for arthralgias (BLE). Negative for back pain and joint swelling.   Skin: Negative for rash.   Neurological: Negative for weakness, numbness and headaches.   Hematological: Does not bruise/bleed easily.   All other systems reviewed and are negative.       Physical Exam     Initial Vitals [08/06/19 2129]   BP Pulse Resp Temp SpO2   (!) 142/78 88 20 98 °F (36.7 °C) 100 %      MAP       --          Physical Exam  Nursing Notes and Vital Signs Reviewed.  Constitutional: Patient is in no acute distress. Obese  Head: Atraumatic. Normocephalic.  Eyes: PERRL. EOM intact. Conjunctivae are not pale. No scleral icterus.  ENT: Mucous membranes are moist. Oropharynx is clear and symmetric.    Neck: Supple. Full ROM. No lymphadenopathy.  Cardiovascular: Regular rate. Regular rhythm. No murmurs, rubs, or gallops. Distal pulses are 2+ and symmetric.  Pulmonary/Chest: No respiratory distress. Clear to auscultation bilaterally. No wheezing or rales.  Abdominal: Soft and non-distended.  There is no tenderness.  No rebound, guarding, or rigidity. Good bowel sounds.  Genitourinary: No CVA tenderness  Musculoskeletal: Moves all extremities. No obvious deformities. No edema. No calf tenderness.   Right hip:  Full range of motion. No proximal thigh tenderness noted.  Right knee:  No joint deformity or swelling noted.  There is crepitance with range of motion. Decreased range of motion secondary to pain  Right ankle:  No deformity noted nontender to palpation.  Left hip:  Full range of motion left hip  Left knee:  Full range of motion of the left knee.  No warmth or erythema noted to the joint.  Left ankle:  No acute abnormality.  Bilateral toes:  Thickened nails in on-call mycosis noted. No ulcers.  Skin: Warm and  "dry.  Neurological:  Alert, awake, and appropriate.  Normal speech.  No acute focal neurological deficits are appreciated.  Psychiatric: Normal affect. Good eye contact. Appropriate in content.     ED Course   Procedures  ED Vital Signs:  Vitals:    08/06/19 2129 08/06/19 2206 08/07/19 0002   BP: (!) 142/78 123/87 (!) 149/63   Pulse: 88 70 76   Resp: 20  16   Temp: 98 °F (36.7 °C)     TempSrc: Oral     SpO2: 100% 97% 98%   Weight: 127.5 kg (281 lb)     Height: 5' 5" (1.651 m)         Abnormal Lab Results:  Labs Reviewed - No data to display     All Lab Results:  None      Imaging Results:  Imaging Results          X-Ray Knee 3 View Bilateral (Final result)  Result time 08/06/19 23:35:36    Final result by Lawrence Cotton III, MD (08/06/19 23:35:36)                 Impression:      Moderately pronounced degenerative changes about both knees.  No acute fracture or dislocation involving either knee.      Electronically signed by: Lawrence Cotton  Date:    08/06/2019  Time:    23:35             Narrative:    EXAMINATION:  XR KNEE 3 VIEW BILATERAL    CLINICAL HISTORY:  Pain in unspecified knee    COMPARISON:  None    FINDINGS:  The right knee shows near total loss of joint space medially with extensive marginal spurring.  There is also rather pronounced degenerative change along the patellofemoral articulation.    The left knee show similar findings with degenerative change, greatest medially and along the patellofemoral articulation.  There is chondrocalcinosis laterally bilaterally.                                        The Emergency Provider reviewed the vital signs and test results, which are outlined above.     ED Discussion   12:10 AM: Reassessed pt at this time. Discussed with pt all pertinent ED information and results. Discussed pt dx and plan of tx. Gave pt all f/u and return to the ED instructions. All questions and concerns were addressed at this time. Pt expresses understanding of information and " instructions, and is comfortable with plan to discharge. Pt is stable for discharge.     reviewed.  I discussed with the patient/guardian regarding the the quantity of the opioid.  I discussed the patient/guardian's option to fill the prescription in a lesser quantity.   I also discussed with the patient/guardian the risks associated with the opioid.      I discussed with patient and/or family/caretaker that evaluation in the ED does not suggest any emergent or life threatening medical conditions requiring immediate intervention beyond what was provided in the ED, and I believe patient is safe for discharge.  Regardless, an unremarkable evaluation in the ED does not preclude the development or presence of a serious of life threatening condition. As such, patient was instructed to return immediately for any worsening or change in current symptoms.        ED Medication(s):  Medications - No data to display    Discharge Medication List as of 8/7/2019 12:06 AM      START taking these medications    Details   HYDROcodone-acetaminophen (NORCO) 7.5-325 mg per tablet Take 1 tablet by mouth every 8 (eight) hours as needed for Pain., Starting Wed 8/7/2019, Print             Follow-up Information     Rufino العلي MD In 2 days.    Specialty:  Orthopedic Surgery  Why:  Return to emergency department for:  Fever, swelling of the knee, redness, or other concerns.  Contact information:  [address] [place] [address] DR Chetan SIMPSON [address] [phone]                         Medical Decision Making:   History:   Old Medical Records: I decided to obtain old medical records.  Old Records Summarized: records from clinic visits and records from previous admission(s).  Clinical Tests:   Radiological Study: Ordered and Reviewed             Scribe Attestation:   Scribe #1: I performed the above scribed service and the documentation accurately describes the services I performed. I attest to the accuracy of the note.     Attending:    Physician Attestation Statement for Scribe #1: I, Dang Sabillon DO, personally performed the services described in this documentation, as scribed by Xochitl Mao, in my presence, and it is both accurate and complete.           Clinical Impression       ICD-10-CM ICD-9-CM   1. Arthritis M19.90 716.90   2. Knee pain M25.569 719.46       Disposition:   Disposition: Discharged  Condition: Stable         Dang Sabillon DO  08/07/19 0304

## 2019-08-07 NOTE — ED NOTES
In bed resting,aaox4,skin warm dry to touch,equal bilateral chest rise and fall,side rails up x 2,bed locked and in low position,instructed to call with any needs. Patient reports right knee pain and both legs are more swollen than normal

## 2019-08-07 NOTE — DISCHARGE INSTRUCTIONS
Pain Medication Interaction warning.    Do not take any sedative medications (benadryl, ativan, xanax, Klonopin, trazadone); medicine for sleep; other pain pills (lortab, percocet), alcohol, with your narcotic prescription.  This could lead to an accidental overdose and possible death.    Take OTC;  Tylenol.   Do not exceed more than 3000 mg of Tylenol in a 24 hr.    Lortab has 325 mg of Tylenol.  Do not exceed more than 3000 mg of Tylenol in a 24 hr.

## 2019-08-08 ENCOUNTER — PES CALL (OUTPATIENT)
Dept: ADMINISTRATIVE | Facility: CLINIC | Age: 74
End: 2019-08-08

## 2019-10-03 ENCOUNTER — HOSPITAL ENCOUNTER (EMERGENCY)
Facility: HOSPITAL | Age: 74
Discharge: HOME | End: 2019-10-03
Attending: EMERGENCY MEDICINE
Payer: MEDICARE

## 2019-10-03 VITALS
OXYGEN SATURATION: 97 % | TEMPERATURE: 98 F | SYSTOLIC BLOOD PRESSURE: 100 MMHG | RESPIRATION RATE: 16 BRPM | DIASTOLIC BLOOD PRESSURE: 54 MMHG | HEART RATE: 76 BPM

## 2019-10-03 DIAGNOSIS — M54.32 SCIATICA, LEFT SIDE: Primary | ICD-10-CM

## 2019-10-03 DIAGNOSIS — M25.559 HIP PAIN: ICD-10-CM

## 2019-10-03 PROCEDURE — 99284 EMERGENCY DEPT VISIT MOD MDM: CPT | Mod: 25

## 2019-10-03 PROCEDURE — 25000003 PHARM REV CODE 250: Performed by: NURSE PRACTITIONER

## 2019-10-03 PROCEDURE — 63600175 PHARM REV CODE 636 W HCPCS: Performed by: NURSE PRACTITIONER

## 2019-10-03 PROCEDURE — 96372 THER/PROPH/DIAG INJ SC/IM: CPT

## 2019-10-03 RX ORDER — MORPHINE SULFATE 4 MG/ML
4 INJECTION, SOLUTION INTRAMUSCULAR; INTRAVENOUS
Status: COMPLETED | OUTPATIENT
Start: 2019-10-03 | End: 2019-10-03

## 2019-10-03 RX ORDER — ONDANSETRON 8 MG/1
8 TABLET, ORALLY DISINTEGRATING ORAL
Status: COMPLETED | OUTPATIENT
Start: 2019-10-03 | End: 2019-10-03

## 2019-10-03 RX ORDER — TRAMADOL HYDROCHLORIDE 50 MG/1
50 TABLET ORAL EVERY 6 HOURS PRN
Qty: 12 TABLET | Refills: 0 | OUTPATIENT
Start: 2019-10-03 | End: 2020-01-10

## 2019-10-03 RX ADMIN — MORPHINE SULFATE 4 MG: 4 INJECTION, SOLUTION INTRAMUSCULAR; INTRAVENOUS at 01:10

## 2019-10-03 RX ADMIN — ONDANSETRON 8 MG: 8 TABLET, ORALLY DISINTEGRATING ORAL at 01:10

## 2019-10-03 NOTE — ED PROVIDER NOTES
Encounter Date: 10/3/2019       History     Chief Complaint   Patient presents with    Leg Pain     since last night; hx of arthritis; ambulatory with assistance      72 year old female with complaint of left lower back pain with radiation into left hip X several days with recent worsening. No fall. No trauma. Constant aching pain.  Worse with movement and walking.  No fever or chills.         Review of patient's allergies indicates:  No Known Allergies  Past Medical History:   Diagnosis Date    Diabetes     HTN (hypertension)     Neuropathy     Obese      Past Surgical History:   Procedure Laterality Date    UMBILICAL HERNIA REPAIR       Family History   Problem Relation Age of Onset    Diabetes Sister     Hypertension Sister      Social History     Tobacco Use    Smoking status: Never Smoker   Substance Use Topics    Alcohol use: No    Drug use: No     Review of Systems   Constitutional: Negative for fever.   HENT: Negative for sore throat.    Respiratory: Negative for shortness of breath.    Cardiovascular: Negative for chest pain.   Gastrointestinal: Negative for nausea.   Genitourinary: Negative for dysuria.   Musculoskeletal: Positive for back pain.        Left hip pain    Skin: Negative for rash.   Neurological: Negative for weakness.   Hematological: Does not bruise/bleed easily.       Physical Exam     Initial Vitals [10/03/19 1205]   BP Pulse Resp Temp SpO2   105/64 74 16 98.3 °F (36.8 °C) 97 %      MAP       --         Physical Exam    Nursing note and vitals reviewed.  Constitutional: She appears well-developed and well-nourished.   HENT:   Head: Normocephalic and atraumatic.   Eyes: Conjunctivae and EOM are normal. Pupils are equal, round, and reactive to light.   Neck: Normal range of motion. Neck supple.   Cardiovascular: Normal rate, regular rhythm, normal heart sounds and intact distal pulses.   Pulmonary/Chest: Breath sounds normal.   Abdominal: Soft. There is no tenderness. There is no  rebound and no guarding.   Musculoskeletal: Normal range of motion.   Pain with ROM of lumbar spine, left lateral hip tenderness, ambulates with walker   Neurological: She is alert and oriented to person, place, and time. She has normal strength and normal reflexes.   Skin: Skin is warm and dry.   Psychiatric: She has a normal mood and affect. Her behavior is normal. Thought content normal.         ED Course   Procedures  Labs Reviewed - No data to display       Imaging Results          X-Ray Hip 2 View Left (Final result)  Result time 10/03/19 13:38:52    Final result by Mau Mason MD (10/03/19 13:38:52)                 Impression:      Unremarkable left hip.      Electronically signed by: Mau Mason MD  Date:    10/03/2019  Time:    13:38             Narrative:    EXAMINATION:  XR HIP 2 VIEW LEFT    CLINICAL HISTORY:  Pain in unspecified hip    TECHNIQUE:  Routine radiographs obtained.    COMPARISON:  05/30/2014    FINDINGS:  Negative for acute fracture or dislocation.    No significant arthritic changes.    Osteopenia.  Left pelvic calcified uterine fibroid measuring 2.5 cm.                               X-Ray Lumbar Spine Ap And Lateral (Final result)  Result time 10/03/19 13:35:59    Final result by Mau Mason MD (10/03/19 13:35:59)                 Impression:      Stable degenerative change.  No acute findings.      Electronically signed by: Mau Mason MD  Date:    10/03/2019  Time:    13:35             Narrative:    EXAMINATION:  XR LUMBAR SPINE AP AND LATERAL    CLINICAL HISTORY:  Low back pain, <6wks, no red flags, no prior management;    TECHNIQUE:  Routine radiographs obtained.    COMPARISON:  03/06/2018    FINDINGS:  No acute compression deformities.    Scattered tiny osteophytes.  Mild disc space narrowing L3 through S1.  Mild anterior subluxation at L5-S1 of 6 mm with moderate facet arthropathy L4 through S1.  Findings appear similar..    Aortic atherosclerosis.  Calcified left uterine  fibroid.                                     Imaging Results          X-Ray Hip 2 View Left (Final result)  Result time 10/03/19 13:38:52    Final result by Mau Mason MD (10/03/19 13:38:52)                 Impression:      Unremarkable left hip.      Electronically signed by: Mau Mason MD  Date:    10/03/2019  Time:    13:38             Narrative:    EXAMINATION:  XR HIP 2 VIEW LEFT    CLINICAL HISTORY:  Pain in unspecified hip    TECHNIQUE:  Routine radiographs obtained.    COMPARISON:  05/30/2014    FINDINGS:  Negative for acute fracture or dislocation.    No significant arthritic changes.    Osteopenia.  Left pelvic calcified uterine fibroid measuring 2.5 cm.                               X-Ray Lumbar Spine Ap And Lateral (Final result)  Result time 10/03/19 13:35:59    Final result by Mau Mason MD (10/03/19 13:35:59)                 Impression:      Stable degenerative change.  No acute findings.      Electronically signed by: Mau Mason MD  Date:    10/03/2019  Time:    13:35             Narrative:    EXAMINATION:  XR LUMBAR SPINE AP AND LATERAL    CLINICAL HISTORY:  Low back pain, <6wks, no red flags, no prior management;    TECHNIQUE:  Routine radiographs obtained.    COMPARISON:  03/06/2018    FINDINGS:  No acute compression deformities.    Scattered tiny osteophytes.  Mild disc space narrowing L3 through S1.  Mild anterior subluxation at L5-S1 of 6 mm with moderate facet arthropathy L4 through S1.  Findings appear similar..    Aortic atherosclerosis.  Calcified left uterine fibroid.                              1:59 PM  Pt reports feeling better                    Clinical Impression:       ICD-10-CM ICD-9-CM   1. Sciatica, left side M54.32 724.3   2. Hip pain M25.559 719.45                                Campbell Cox, VANE  10/03/19 1400

## 2019-11-06 ENCOUNTER — HOSPITAL ENCOUNTER (EMERGENCY)
Facility: HOSPITAL | Age: 74
Discharge: HOME | End: 2019-11-06
Attending: EMERGENCY MEDICINE
Payer: MEDICARE

## 2019-11-06 VITALS
RESPIRATION RATE: 16 BRPM | WEIGHT: 221.13 LBS | HEIGHT: 66 IN | BODY MASS INDEX: 35.54 KG/M2 | SYSTOLIC BLOOD PRESSURE: 122 MMHG | DIASTOLIC BLOOD PRESSURE: 58 MMHG | HEART RATE: 64 BPM | OXYGEN SATURATION: 98 % | TEMPERATURE: 98 F

## 2019-11-06 DIAGNOSIS — M25.551 HIP PAIN, RIGHT: ICD-10-CM
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
## 2019-11-06 PROCEDURE — 99283 EMERGENCY DEPT VISIT LOW MDM: CPT | Mod: 25

## 2019-11-06 PROCEDURE — 99284 EMERGENCY DEPT VISIT MOD MDM: CPT | Mod: 25

## 2019-11-06 PROCEDURE — 25000003 PHARM REV CODE 250: Mod: HCWC | Performed by: EMERGENCY MEDICINE

## 2019-11-06 RX ORDER — HYDROCODONE BITARTRATE AND ACETAMINOPHEN 5; 325 MG/1; MG/1
1 TABLET ORAL EVERY 4 HOURS PRN
Qty: 12 TABLET | Refills: 0 | Status: SHIPPED | OUTPATIENT
Start: 2019-11-06 | End: 2019-11-16

## 2019-11-06 RX ORDER — TOBRAMYCIN 3 MG/ML
SOLUTION/ DROPS OPHTHALMIC
COMMUNITY
End: 2020-11-12

## 2019-11-06 RX ORDER — FUROSEMIDE 40 MG/1
TABLET ORAL
COMMUNITY

## 2019-11-06 RX ORDER — HYDROCODONE BITARTRATE AND ACETAMINOPHEN 5; 325 MG/1; MG/1
1 TABLET ORAL
Status: COMPLETED | OUTPATIENT
Start: 2019-11-06 | End: 2019-11-06

## 2019-11-06 RX ORDER — ATORVASTATIN CALCIUM 40 MG/1
TABLET, FILM COATED ORAL
COMMUNITY

## 2019-11-06 RX ADMIN — HYDROCODONE BITARTRATE AND ACETAMINOPHEN 1 TABLET: 5; 325 TABLET ORAL at 03:11

## 2019-11-06 NOTE — ED PROVIDER NOTES
SCRIBE #1 NOTE: I, Scarlett Armas, am scribing for, and in the presence of, Ricardo Abdul MD. I have scribed the entire note.       History     Chief Complaint   Patient presents with    Hip Pain     R hip pain after fall 2 days ago     Review of patient's allergies indicates:  No Known Allergies      History of Present Illness     HPI    11/6/2019, 2:12 AM  History obtained from the patient      History of Present Illness: Dilia Talley is a 73 y.o. female patient with a PMHx of DM, HTN who presents to the Emergency Department for evaluation of R hip pain s/p fall which onset 2 days ago. Symptoms are constant and moderate in severity.  No mitigating or exacerbating factors reported. No associated sxs reported. Patient denies any CP, SOB, N/V, extremity numbness/weakness, palpitations, LOC, dizziness, HA, lightheadedness, abd pain, and all other sxs at this time. No further complaints or concerns at this time.         Arrival mode: Personal vehicle      PCP: Leif Townsend MD        Past Medical History:  Past Medical History:   Diagnosis Date    Diabetes     HTN (hypertension)     Neuropathy     Obese        Past Surgical History:  Past Surgical History:   Procedure Laterality Date    UMBILICAL HERNIA REPAIR           Family History:  Family History   Problem Relation Age of Onset    Diabetes Sister     Hypertension Sister        Social History:  Social History     Tobacco Use    Smoking status: Never Smoker   Substance and Sexual Activity    Alcohol use: No    Drug use: No    Sexual activity: Not Currently     Partners: Male     Birth control/protection: None        Review of Systems     Review of Systems   Constitutional: Negative for fever.   HENT: Negative for sore throat.    Respiratory: Negative for shortness of breath.    Cardiovascular: Negative for chest pain and palpitations.   Gastrointestinal: Negative for abdominal pain, nausea and vomiting.   Genitourinary: Negative for dysuria.    Musculoskeletal: Positive for arthralgias (R hip). Negative for back pain.   Skin: Negative for rash.   Neurological: Negative for dizziness, syncope, weakness, light-headedness, numbness and headaches.   Hematological: Does not bruise/bleed easily.   All other systems reviewed and are negative.       Physical Exam     Initial Vitals [11/06/19 0138]   BP Pulse Resp Temp SpO2   120/65 97 16 97.7 °F (36.5 °C) 100 %      MAP       --          Physical Exam  Nursing Notes and Vital Signs Reviewed.  Constitutional: Patient is in no acute distress. Well-developed and well-nourished.  Head: Atraumatic. Normocephalic.  Eyes: PERRL. EOM intact. Conjunctivae are not pale. No scleral icterus.  ENT: Mucous membranes are moist. Oropharynx is clear and symmetric.    Neck: Supple. Full ROM. No lymphadenopathy.  Cardiovascular: Regular rate. Regular rhythm. No murmurs, rubs, or gallops. Distal pulses are 2+ and symmetric.  Pulmonary/Chest: No respiratory distress. Clear to auscultation bilaterally. No wheezing or rales.  Abdominal: Soft and non-distended.  There is no tenderness.  No rebound, guarding, or rigidity. Good bowel sounds.  Genitourinary: No CVA tenderness  Musculoskeletal: Moves all extremities. No obvious deformities. No edema. No calf tenderness.  RLE: no evident deformity. Pain to lateral hip. ROM is limited secondary to pain. Cap refill distally is <2 seconds. DP and PT pulses are equal and 2+ bilaterally. No motor deficit. No distal sensory deficit  Skin: Warm and dry.  Neurological:  Alert, awake, and appropriate.  Normal speech.  No acute focal neurological deficits are appreciated.  Psychiatric: Normal affect. Good eye contact. Appropriate in content.     ED Course   Procedures  ED Vital Signs:  Vitals:    11/06/19 0138 11/06/19 0145 11/06/19 0402 11/06/19 0532   BP: 120/65  (!) 116/59 (!) 122/58   Pulse: 97  71 64   Resp: 16  16 16   Temp: 97.7 °F (36.5 °C)   97.9 °F (36.6 °C)   TempSrc: Oral   Oral   SpO2:  "100%  99% 98%   Weight:  100.3 kg (221 lb 2 oz)     Height: 5' 5.5" (1.664 m)          Abnormal Lab Results:  Labs Reviewed - No data to display     All Lab Results:  none    Imaging Results:  Imaging Results          X-Ray Lumbar Spine Complete 5 View (Final result)  Result time 11/06/19 08:22:44    Final result by LEANN Young Sr., MD (11/06/19 08:22:44)                 Impression:      1. There is grade 1 anterolisthesis of L5 on S1.  2. There is a prominent amount of fecal material within the colon.  3. There is a mild amount of atherosclerosis.  4. There is a 26 mm calcification projected over the left side of the pelvis.  This is characteristic of a calcified uterine fibroid.      Electronically signed by: Jeff Young MD  Date:    11/06/2019  Time:    08:22             Narrative:    EXAMINATION:  XR LUMBAR SPINE COMPLETE 5 VIEW    CLINICAL HISTORY:  Low back pain, minor trauma;    COMPARISON:  10/03/2019    FINDINGS:  There are 5 lumbar type vertebral bodies.  There is grade 1 anterolisthesis of L5 on S1.  There is no fracture or scoliosis. There is normal lumbar lordosis.  There is a mild amount of atherosclerosis.  There is a prominent amount of fecal material within the colon.  There is a 26 mm calcification projected over the left side of the pelvis.                               X-Ray Hip 2 View Right (Final result)  Result time 11/06/19 08:28:11    Final result by LEANN Young Sr., MD (11/06/19 08:28:11)                 Impression:      1. The joint space of the right hip is normal in appearance.  2. There is a 24 mm calcific density projected over the left side of the pelvis.  This is characteristic of a calcified uterine fibroid.  If additional imaging evaluation is clinically indicated, I recommend consideration of an ultrasound examination of the pelvis.      Electronically signed by: Jeff Young MD  Date:    11/06/2019  Time:    08:28             Narrative:    EXAMINATION:  XR HIP 2 " VIEW RIGHT    CLINICAL HISTORY:  Pain in right hip    COMPARISON:  10/03/2019    FINDINGS:  There is no fracture. There is no dislocation.  The joint space of both hips is normal in appearance.  There is a 24 mm calcific density projected over the left side of the pelvis.                               Per ED physician, pt's lumbar spine XR results NAF.    Per ED physician, pt's hip XR results NAF.             The Emergency Provider reviewed the vital signs and test results, which are outlined above.     ED Discussion     5:34 AM: Reassessed pt at this time.  Pt states her condition has improved at this time. Discussed with pt all pertinent ED information and results. Discussed pt dx and plan of tx. Gave pt all f/u and return to the ED instructions. All questions and concerns were addressed at this time. Pt expresses understanding of information and instructions, and is comfortable with plan to discharge. Pt is stable for discharge.      I discussed with patient and/or family/caretaker that evaluation in the ED does not suggest any emergent or life threatening medical conditions requiring immediate intervention beyond what was provided in the ED, and I believe patient is safe for discharge.  Regardless, an unremarkable evaluation in the ED does not preclude the development or presence of a serious of life threatening condition. As such, patient was instructed to return immediately for any worsening or change in current symptoms.         Medical Decision Making:   Clinical Tests:   Radiological Study: Ordered and Reviewed           ED Medication(s):  Medications   HYDROcodone-acetaminophen 5-325 mg per tablet 1 tablet (1 tablet Oral Given 11/6/19 0304)       Discharge Medication List as of 11/6/2019  5:36 AM      START taking these medications    Details   HYDROcodone-acetaminophen (NORCO) 5-325 mg per tablet Take 1 tablet by mouth every 4 (four) hours as needed for Pain., Starting Wed 11/6/2019, Until Sat 11/16/2019,  Print             Follow-up Information     Leif Townsend MD In 2 days.    Specialty:  Family Medicine  Contact information:  [address]   [address]  [facility] [address] [phone]             [facility] - .    Specialty:  Emergency Medicine  Why:  As needed  Contact information:  [address]  [facility] [address] [phone]                     Scribe Attestation:   Scribe #1: I performed the above scribed service and the documentation accurately describes the services I performed. I attest to the accuracy of the note.     Attending:   Physician Attestation Statement for Scribe #1: I, Ricardo Abdul MD, personally performed the services described in this documentation, as scribed by Scarlett Armas, in my presence, and it is both accurate and complete.           Clinical Impression       ICD-10-CM ICD-9-CM   1. Fall, initial encounter W19.XXXA E888.9   2. Hip pain, right M25.551 719.45       Disposition:   Disposition: Discharged  Condition: Stable         Ricardo Abdul MD  11/09/19 0558

## 2019-11-06 NOTE — ED NOTES
Called son, Ruben [phone], who states he does not drive and said to try calling his son. Called grandson, Latricia Thomas [phone], phone went straight to voicemail. Called Ruben again and he said to try calling his brother Baldo [phone].

## 2019-11-07 ENCOUNTER — PES CALL (OUTPATIENT)
Dept: ADMINISTRATIVE | Facility: CLINIC | Age: 74
End: 2019-11-07

## 2019-12-06 ENCOUNTER — HOSPITAL ENCOUNTER (EMERGENCY)
Facility: HOSPITAL | Age: 74
Discharge: HOME | End: 2019-12-06
Attending: EMERGENCY MEDICINE
Payer: MEDICARE

## 2019-12-06 VITALS
RESPIRATION RATE: 20 BRPM | DIASTOLIC BLOOD PRESSURE: 64 MMHG | HEART RATE: 78 BPM | SYSTOLIC BLOOD PRESSURE: 112 MMHG | OXYGEN SATURATION: 98 % | TEMPERATURE: 98 F | HEIGHT: 65 IN | BODY MASS INDEX: 36.8 KG/M2

## 2019-12-06 DIAGNOSIS — M62.838 MUSCLE SPASM: Primary | ICD-10-CM

## 2019-12-06 LAB
ALBUMIN SERPL BCP-MCNC: 2.8 G/DL (ref 3.5–5.2)
ALP SERPL-CCNC: 89 U/L (ref 55–135)
ALT SERPL W/O P-5'-P-CCNC: 5 U/L (ref 10–44)
ANION GAP SERPL CALC-SCNC: 10 MMOL/L (ref 8–16)
AST SERPL-CCNC: 11 U/L (ref 10–40)
BASOPHILS # BLD AUTO: 0.04 K/UL (ref 0–0.2)
BASOPHILS NFR BLD: 0.4 % (ref 0–1.9)
BILIRUB SERPL-MCNC: 0.6 MG/DL (ref 0.1–1)
BILIRUB UR QL STRIP: NEGATIVE
BUN SERPL-MCNC: 19 MG/DL (ref 8–23)
CALCIUM SERPL-MCNC: 9.7 MG/DL (ref 8.7–10.5)
CHLORIDE SERPL-SCNC: 103 MMOL/L (ref 95–110)
CLARITY UR: CLEAR
CO2 SERPL-SCNC: 25 MMOL/L (ref 23–29)
COLOR UR: YELLOW
CREAT SERPL-MCNC: 1.3 MG/DL (ref 0.5–1.4)
DIFFERENTIAL METHOD: ABNORMAL
EOSINOPHIL # BLD AUTO: 0.2 K/UL (ref 0–0.5)
EOSINOPHIL NFR BLD: 2.4 % (ref 0–8)
ERYTHROCYTE [DISTWIDTH] IN BLOOD BY AUTOMATED COUNT: 13.2 % (ref 11.5–14.5)
EST. GFR  (AFRICAN AMERICAN): 46 ML/MIN/1.73 M^2
EST. GFR  (NON AFRICAN AMERICAN): 40 ML/MIN/1.73 M^2
GLUCOSE SERPL-MCNC: 94 MG/DL (ref 70–110)
GLUCOSE UR QL STRIP: NEGATIVE
HCT VFR BLD AUTO: 32.4 % (ref 37–48.5)
HGB BLD-MCNC: 10.5 G/DL (ref 12–16)
HGB UR QL STRIP: NEGATIVE
IMM GRANULOCYTES # BLD AUTO: 0.03 K/UL (ref 0–0.04)
IMM GRANULOCYTES NFR BLD AUTO: 0.3 % (ref 0–0.5)
KETONES UR QL STRIP: NEGATIVE
LEUKOCYTE ESTERASE UR QL STRIP: NEGATIVE
LIPASE SERPL-CCNC: 16 U/L (ref 4–60)
LYMPHOCYTES # BLD AUTO: 2.6 K/UL (ref 1–4.8)
LYMPHOCYTES NFR BLD: 28.3 % (ref 18–48)
MCH RBC QN AUTO: 30.2 PG (ref 27–31)
MCHC RBC AUTO-ENTMCNC: 32.4 G/DL (ref 32–36)
MCV RBC AUTO: 93 FL (ref 82–98)
MONOCYTES # BLD AUTO: 1 K/UL (ref 0.3–1)
MONOCYTES NFR BLD: 10.7 % (ref 4–15)
NEUTROPHILS # BLD AUTO: 5.3 K/UL (ref 1.8–7.7)
NEUTROPHILS NFR BLD: 57.9 % (ref 38–73)
NITRITE UR QL STRIP: NEGATIVE
NRBC BLD-RTO: 0 /100 WBC
PH UR STRIP: 8 [PH] (ref 5–8)
PLATELET # BLD AUTO: 310 K/UL (ref 150–350)
PMV BLD AUTO: 9.5 FL (ref 9.2–12.9)
POTASSIUM SERPL-SCNC: 3.6 MMOL/L (ref 3.5–5.1)
PROT SERPL-MCNC: 6.9 G/DL (ref 6–8.4)
PROT UR QL STRIP: NEGATIVE
RBC # BLD AUTO: 3.48 M/UL (ref 4–5.4)
SODIUM SERPL-SCNC: 138 MMOL/L (ref 136–145)
SP GR UR STRIP: 1.01 (ref 1–1.03)
URN SPEC COLLECT METH UR: NORMAL
UROBILINOGEN UR STRIP-ACNC: 1 EU/DL
WBC # BLD AUTO: 9.21 K/UL (ref 3.9–12.7)

## 2019-12-06 PROCEDURE — 83690 ASSAY OF LIPASE: CPT

## 2019-12-06 PROCEDURE — 25000003 PHARM REV CODE 250: Performed by: EMERGENCY MEDICINE

## 2019-12-06 PROCEDURE — 99284 EMERGENCY DEPT VISIT MOD MDM: CPT | Mod: 25

## 2019-12-06 PROCEDURE — 96361 HYDRATE IV INFUSION ADD-ON: CPT

## 2019-12-06 PROCEDURE — 81003 URINALYSIS AUTO W/O SCOPE: CPT

## 2019-12-06 PROCEDURE — 85025 COMPLETE CBC W/AUTO DIFF WBC: CPT

## 2019-12-06 PROCEDURE — 96375 TX/PRO/DX INJ NEW DRUG ADDON: CPT

## 2019-12-06 PROCEDURE — 63600175 PHARM REV CODE 636 W HCPCS: Performed by: EMERGENCY MEDICINE

## 2019-12-06 PROCEDURE — 80053 COMPREHEN METABOLIC PANEL: CPT

## 2019-12-06 PROCEDURE — 96374 THER/PROPH/DIAG INJ IV PUSH: CPT

## 2019-12-06 RX ORDER — BACLOFEN 10 MG/1
10 TABLET ORAL 3 TIMES DAILY
Qty: 30 TABLET | Refills: 0 | Status: SHIPPED | OUTPATIENT
Start: 2019-12-06 | End: 2020-11-12

## 2019-12-06 RX ORDER — DEXAMETHASONE SODIUM PHOSPHATE 4 MG/ML
4 INJECTION, SOLUTION INTRA-ARTICULAR; INTRALESIONAL; INTRAMUSCULAR; INTRAVENOUS; SOFT TISSUE
Status: COMPLETED | OUTPATIENT
Start: 2019-12-06 | End: 2019-12-06

## 2019-12-06 RX ORDER — ONDANSETRON 4 MG/1
4 TABLET, FILM COATED ORAL EVERY 6 HOURS
Qty: 30 TABLET | Refills: 0 | Status: SHIPPED | OUTPATIENT
Start: 2019-12-06 | End: 2020-11-12

## 2019-12-06 RX ORDER — HYDROCODONE BITARTRATE AND ACETAMINOPHEN 7.5; 325 MG/1; MG/1
1 TABLET ORAL EVERY 4 HOURS PRN
Qty: 30 TABLET | Refills: 0 | OUTPATIENT
Start: 2019-12-06 | End: 2020-01-10

## 2019-12-06 RX ORDER — MORPHINE SULFATE 4 MG/ML
4 INJECTION, SOLUTION INTRAMUSCULAR; INTRAVENOUS
Status: COMPLETED | OUTPATIENT
Start: 2019-12-06 | End: 2019-12-06

## 2019-12-06 RX ORDER — ONDANSETRON 2 MG/ML
4 INJECTION INTRAMUSCULAR; INTRAVENOUS
Status: COMPLETED | OUTPATIENT
Start: 2019-12-06 | End: 2019-12-06

## 2019-12-06 RX ORDER — CYCLOBENZAPRINE HCL 10 MG
10 TABLET ORAL
Status: COMPLETED | OUTPATIENT
Start: 2019-12-06 | End: 2019-12-06

## 2019-12-06 RX ORDER — KETOROLAC TROMETHAMINE 30 MG/ML
15 INJECTION, SOLUTION INTRAMUSCULAR; INTRAVENOUS
Status: COMPLETED | OUTPATIENT
Start: 2019-12-06 | End: 2019-12-06

## 2019-12-06 RX ADMIN — KETOROLAC TROMETHAMINE 15 MG: 30 INJECTION, SOLUTION INTRAMUSCULAR; INTRAVENOUS at 01:12

## 2019-12-06 RX ADMIN — DEXAMETHASONE SODIUM PHOSPHATE 4 MG: 4 INJECTION, SOLUTION INTRAMUSCULAR; INTRAVENOUS at 04:12

## 2019-12-06 RX ADMIN — MORPHINE SULFATE 4 MG: 4 INJECTION INTRAVENOUS at 01:12

## 2019-12-06 RX ADMIN — CYCLOBENZAPRINE 10 MG: 10 TABLET, FILM COATED ORAL at 02:12

## 2019-12-06 RX ADMIN — SODIUM CHLORIDE 1000 ML: 0.9 INJECTION, SOLUTION INTRAVENOUS at 02:12

## 2019-12-06 RX ADMIN — ONDANSETRON 4 MG: 2 INJECTION INTRAMUSCULAR; INTRAVENOUS at 01:12

## 2019-12-06 NOTE — ED PROVIDER NOTES
SCRIBE #1 NOTE: I, Apoorva Momin, am scribing for, and in the presence of, Candy Coats Do, MD. I have scribed the entire note.      History      Chief Complaint   Patient presents with    Back Pain     lower back pain denies injury       Review of patient's allergies indicates:  No Known Allergies     HPI   HPI    12/6/2019, 1:21 AM   History obtained from the patient      History of Present Illness: Dilia Talley is a 74 y.o. female patient who presents to the Emergency Department for evaluation of lower back pain which onset yesterday.  Pt did about 1 month ago.  Seen here no fx on xray film.  Uses a walker at home, no recent trauma.  Started having muscle spasm and back pain yesterday and getting worse.  Any movement makes it worse. Symptoms are constant and rated 10/10 in severity. Patient reports increased pain secondary to movement. No associated sxs. Patient denies any N/V, fecal/bladder incontinence, extremity numbness/weakness, CP, SOB, and all other sxs at this time. Denies any recent falls, trauma, or injury. No prior Tx. No further complaints or concerns at this time.       Arrival mode: Ambulance Service    PCP: Leif Townsend MD       Past Medical History:  Past Medical History:   Diagnosis Date    Diabetes     HTN (hypertension)     Neuropathy     Obese        Past Surgical History:  Past Surgical History:   Procedure Laterality Date    UMBILICAL HERNIA REPAIR           Family History:  Family History   Problem Relation Age of Onset    Diabetes Sister     Hypertension Sister        Social History:  Social History     Tobacco Use    Smoking status: Never Smoker    Smokeless tobacco: Never Used   Substance and Sexual Activity    Alcohol use: No    Drug use: No    Sexual activity: Not Currently     Partners: Male     Birth control/protection: None       ROS   Review of Systems   Constitutional: Negative for fever.   HENT: Negative for sore throat.    Respiratory: Negative for shortness of  breath.    Cardiovascular: Negative for chest pain.   Gastrointestinal: Negative for nausea and vomiting.   Genitourinary: Negative for dysuria.   Musculoskeletal: Positive for back pain (lower).   Skin: Negative for rash.   Neurological: Negative for weakness and numbness.        (-) Fecal/bladder incontinence   Hematological: Does not bruise/bleed easily.   All other systems reviewed and are negative.    Physical Exam      Initial Vitals [12/06/19 0057]   BP Pulse Resp Temp SpO2   124/72 78 16 98 °F (36.7 °C) 98 %      MAP       --          Physical Exam  Nursing Notes and Vital Signs Reviewed.  Constitutional: Patient is in no acute distress. Well-developed and well-nourished.  Head: Atraumatic. Normocephalic.  Eyes: EOM intact. Conjunctivae are not pale. No scleral icterus.  ENT: Mucous membranes are moist.   Neck: Supple. Full ROM.  Cardiovascular: Regular rate. Regular rhythm. No murmurs, rubs, or gallops. Distal pulses are 2+ and symmetric.  Pulmonary/Chest: No respiratory distress. Clear to auscultation bilaterally. No wheezing or rales.  Abdominal: Soft and non-distended.  There is no tenderness. Good sensation to peritoneal area.   Back: Patient has muscle spasms to the lower thoracic spine. No midline bony tenderness, deformities, or step-offs of the T-spine or L-spine. Skin appears normal without abrasions or bruising. No erythema, induration, or fluctuance.   Musculoskeletal: Moves all extremities. No obvious deformities. No edema.   Skin: Warm and dry.  Neurological:  Alert, awake, and appropriate.  Normal speech.  No acute focal neurological deficits are appreciated.  Psychiatric: Normal affect. Good eye contact. Appropriate in content.    ED Course    Procedures  ED Vital Signs:  Vitals:    12/06/19 0057 12/06/19 0100 12/06/19 0200 12/06/19 0300   BP: 124/72 (!) 107/57 (!) 108/52 (!) 108/56   Pulse: 78 79 84 69   Resp: 16 14 14 14   Temp: 98 °F (36.7 °C)      TempSrc: Oral      SpO2: 98% 98% 100%  "100%   Height: 5' 5" (1.651 m)       12/06/19 0400 12/06/19 0500 12/06/19 0527 12/06/19 0528   BP: 103/61 (!) 109/58 112/64    Pulse: 65 66 78    Resp: 14 14 20    Temp:    98 °F (36.7 °C)   TempSrc:    Oral   SpO2: 99% 99% 98%    Height:           Abnormal Lab Results:  Labs Reviewed   CBC W/ AUTO DIFFERENTIAL - Abnormal; Notable for the following components:       Result Value    RBC 3.48 (*)     Hemoglobin 10.5 (*)     Hematocrit 32.4 (*)     All other components within normal limits   COMPREHENSIVE METABOLIC PANEL - Abnormal; Notable for the following components:    Albumin 2.8 (*)     ALT 5 (*)     eGFR if  46 (*)     eGFR if non  40 (*)     All other components within normal limits   LIPASE   URINALYSIS, REFLEX TO URINE CULTURE    Narrative:     Preferred Collection Type->Urine, Clean Catch        All Lab Results:  Results for orders placed or performed during the hospital encounter of 12/06/19   CBC W/ AUTO DIFFERENTIAL   Result Value Ref Range    WBC 9.21 3.90 - 12.70 K/uL    RBC 3.48 (L) 4.00 - 5.40 M/uL    Hemoglobin 10.5 (L) 12.0 - 16.0 g/dL    Hematocrit 32.4 (L) 37.0 - 48.5 %    Mean Corpuscular Volume 93 82 - 98 fL    Mean Corpuscular Hemoglobin 30.2 27.0 - 31.0 pg    Mean Corpuscular Hemoglobin Conc 32.4 32.0 - 36.0 g/dL    RDW 13.2 11.5 - 14.5 %    Platelets 310 150 - 350 K/uL    MPV 9.5 9.2 - 12.9 fL    Immature Granulocytes 0.3 0.0 - 0.5 %    Gran # (ANC) 5.3 1.8 - 7.7 K/uL    Immature Grans (Abs) 0.03 0.00 - 0.04 K/uL    Lymph # 2.6 1.0 - 4.8 K/uL    Mono # 1.0 0.3 - 1.0 K/uL    Eos # 0.2 0.0 - 0.5 K/uL    Baso # 0.04 0.00 - 0.20 K/uL    nRBC 0 0 /100 WBC    Gran% 57.9 38.0 - 73.0 %    Lymph% 28.3 18.0 - 48.0 %    Mono% 10.7 4.0 - 15.0 %    Eosinophil% 2.4 0.0 - 8.0 %    Basophil% 0.4 0.0 - 1.9 %    Differential Method Automated    Comp. Metabolic Panel   Result Value Ref Range    Sodium 138 136 - 145 mmol/L    Potassium 3.6 3.5 - 5.1 mmol/L    Chloride 103 95 - 110 " mmol/L    CO2 25 23 - 29 mmol/L    Glucose 94 70 - 110 mg/dL    BUN, Bld 19 8 - 23 mg/dL    Creatinine 1.3 0.5 - 1.4 mg/dL    Calcium 9.7 8.7 - 10.5 mg/dL    Total Protein 6.9 6.0 - 8.4 g/dL    Albumin 2.8 (L) 3.5 - 5.2 g/dL    Total Bilirubin 0.6 0.1 - 1.0 mg/dL    Alkaline Phosphatase 89 55 - 135 U/L    AST 11 10 - 40 U/L    ALT 5 (L) 10 - 44 U/L    Anion Gap 10 8 - 16 mmol/L    eGFR if African American 46 (A) >60 mL/min/1.73 m^2    eGFR if non African American 40 (A) >60 mL/min/1.73 m^2   Lipase   Result Value Ref Range    Lipase 16 4 - 60 U/L   Urinalysis, Reflex to Urine Culture Urine, Clean Catch   Result Value Ref Range    Specimen UA Urine, Clean Catch     Color, UA Yellow Yellow, Straw, Shabnam    Appearance, UA Clear Clear    pH, UA 8.0 5.0 - 8.0    Specific Gravity, UA 1.010 1.005 - 1.030    Protein, UA Negative Negative    Glucose, UA Negative Negative    Ketones, UA Negative Negative    Bilirubin (UA) Negative Negative    Occult Blood UA Negative Negative    Nitrite, UA Negative Negative    Urobilinogen, UA 1.0 <2.0 EU/dL    Leukocytes, UA Negative Negative         Imaging Results:  Imaging Results          X-Ray Chest AP Portable (In process)                X-Ray Lumbar Spine Ap And Lateral (In process)                3:49 AM: Per ED physician, pt's CXR results: No cardiomegaly. No infiltrate. No pneumonia. Normal mediastinum.     3:49 AM: Per ED physician, pt's XR Lumbar Spine results: Mild bone spurring noted. No fracture.           The Emergency Provider reviewed the vital signs and test results, which are outlined above.    ED Discussion     3:53 AM: Re-evaluated pt. Pt is resting comfortably and is in no acute distress. D/w pt all pertinent results. D/w pt any concerns expressed at this time. Answered all questions. Pt expresses understanding at this time.  Grandhira  her up. She was able to transfer to a wheelchair and reports her back feels a lot better and was very thankful.        ED  Medication(s):  Medications   ketorolac injection 15 mg (15 mg Intravenous Given 12/6/19 0141)   morphine injection 4 mg (4 mg Intravenous Given 12/6/19 0140)   ondansetron injection 4 mg (4 mg Intravenous Given 12/6/19 0140)   cyclobenzaprine tablet 10 mg (10 mg Oral Given 12/6/19 0248)   sodium chloride 0.9% bolus 1,000 mL (0 mLs Intravenous Stopped 12/6/19 0359)   dexamethasone injection 4 mg (4 mg Intravenous Given 12/6/19 0402)       Follow-up Information     Leif Townsend MD In 2 days.    Specialty:  Family Medicine  Contact information:  [address]  [facility] [address]  [phone]                     Medical Decision Making              Scribe Attestation:   Scribe #1: I performed the above scribed service and the documentation accurately describes the services I performed. I attest to the accuracy of the note.    Attending:   Physician Attestation Statement for Scribe #1: I, Candy Coats Do, MD, personally performed the services described in this documentation, as scribed by Apoorva Momin, in my presence, and it is both accurate and complete.          Clinical Impression       ICD-10-CM ICD-9-CM   1. Muscle spasm M62.838 728.85       Disposition:   Disposition: Discharged  Condition: Stable         Candy Coats Do, MD  12/06/19 5188

## 2019-12-06 NOTE — ED NOTES
"Patient states that she walks with a walker normally, but her low back pain has gotten so "bad" that she could not get out of bed. She called EMS because she was home alone.  "

## 2020-01-10 ENCOUNTER — HOSPITAL ENCOUNTER (EMERGENCY)
Facility: HOSPITAL | Age: 75
Discharge: HOME | End: 2020-01-10
Attending: EMERGENCY MEDICINE
Payer: MEDICARE

## 2020-01-10 VITALS
SYSTOLIC BLOOD PRESSURE: 142 MMHG | RESPIRATION RATE: 16 BRPM | HEIGHT: 65 IN | TEMPERATURE: 99 F | BODY MASS INDEX: 36.8 KG/M2 | OXYGEN SATURATION: 100 % | DIASTOLIC BLOOD PRESSURE: 82 MMHG | HEART RATE: 80 BPM

## 2020-01-10 DIAGNOSIS — M79.89 LEG SWELLING: ICD-10-CM

## 2020-01-10 DIAGNOSIS — M79.605 PAIN IN BOTH LOWER EXTREMITIES: Primary | ICD-10-CM

## 2020-01-10 DIAGNOSIS — M79.604 PAIN IN BOTH LOWER EXTREMITIES: Primary | ICD-10-CM

## 2020-01-10 PROCEDURE — 99284 EMERGENCY DEPT VISIT MOD MDM: CPT | Mod: 25

## 2020-01-10 RX ORDER — NAPROXEN 375 MG/1
375 TABLET ORAL 2 TIMES DAILY WITH MEALS
Qty: 14 TABLET | Refills: 0 | OUTPATIENT
Start: 2020-01-10 | End: 2020-07-21

## 2020-01-10 RX ORDER — HYDROCODONE BITARTRATE AND ACETAMINOPHEN 5; 325 MG/1; MG/1
1 TABLET ORAL EVERY 4 HOURS PRN
Qty: 12 TABLET | Refills: 0 | Status: SHIPPED | OUTPATIENT
Start: 2020-01-10 | End: 2020-01-20

## 2020-01-11 NOTE — ED PROVIDER NOTES
1/10/2020, 9:48 PM   History obtained from the patient      History of Present Illness: Dilia Talley is a 74 y.o. female patient presenting for right hip pain and dusty lower ext swelling.    9:49 PM: Pt was placed back in [facility]. I explained to pt that due to lack of available rooms pt was seen by myself to begin the workup. Pt understands they will be seen and dispositioned by the next available physician. Pt voices understanding and agrees with plan. Pt also understands the longer than normal wait time. I am removing myself from the care of pt and pt will now be assigned to the next available physician.         Yoel Nraayan-[facility]    SCRIBE #1 NOTE: I, Meena Snow, am scribing for, and in the presence of, Ricardo Abdul MD. I have scribed the entire note.       History     Chief Complaint   Patient presents with    Leg Pain     R leg pain x 2-3 weeks, reports BLE swelling x 1-2 months     Review of patient's allergies indicates:  No Known Allergies      History of Present Illness     HPI    1/10/2020, 10:57 PM  History obtained from the patient      History of Present Illness: Dilia Talley is a 74 y.o. female patient with a PMHx of diabetes, HTN, neuropathy, obese, who presents to the Emergency Department for evaluation of R leg pain which onset gradually 2-3 weeks ago. Pt states pain has worsened within the past few days. Pt reports BLE swelling for 1-2 months. Symptoms are constant and moderate in severity. Pt reports pain is worse when walking. Associated sxs include BLE edema and L bottom foot pain. Patient denies any fever/chills, fatigue, congestion, sore throat, SOB, cough, CP, palpitations, n/v/d, dysuria, hematuria, back pain, rash, HA, dizziness, bruises/blds easily, and all other sxs at this time. No prior Tx reported. No further complaints or concerns at this time.       Arrival mode: Personal vehicle     PCP: Leif Townsend MD        Past Medical History:  Past Medical History:   Diagnosis Date     Diabetes     HTN (hypertension)     Neuropathy     Obese        Past Surgical History:  Past Surgical History:   Procedure Laterality Date    UMBILICAL HERNIA REPAIR           Family History:  Family History   Problem Relation Age of Onset    Diabetes Sister     Hypertension Sister        Social History:  Social History     Tobacco Use    Smoking status: Never Smoker    Smokeless tobacco: Never Used   Substance and Sexual Activity    Alcohol use: No    Drug use: No    Sexual activity: Not Currently     Partners: Male     Birth control/protection: None        Review of Systems     Review of Systems   Constitutional: Negative for chills, fatigue and fever.   HENT: Negative for congestion and sore throat.    Respiratory: Negative for cough and shortness of breath.    Cardiovascular: Negative for chest pain and palpitations.   Gastrointestinal: Negative for diarrhea, nausea and vomiting.   Genitourinary: Negative for dysuria and hematuria.   Musculoskeletal: Negative for back pain.        (+) R hip pain  (+) BLE swelling  (+) L bottom foot pain   Skin: Negative for rash.   Neurological: Negative for dizziness and headaches.   Hematological: Does not bruise/bleed easily.   All other systems reviewed and are negative.     Physical Exam     Initial Vitals [01/10/20 2141]   BP Pulse Resp Temp SpO2   126/72 90 18 98.3 °F (36.8 °C) 96 %      MAP       --          Physical Exam  Nursing Notes and Vital Signs Reviewed.  Constitutional: Patient is in no acute distress. Well-developed and well-nourished.  Head: Atraumatic. Normocephalic.  Eyes: PERRL. EOM intact. Conjunctivae are not pale. No scleral icterus.  ENT: Mucous membranes are moist. Oropharynx is clear and symmetric.    Neck: Supple. Full ROM. No lymphadenopathy.  Cardiovascular: Regular rate. Regular rhythm. No murmurs, rubs, or gallops. Distal pulses are 2+ and symmetric.  Pulmonary/Chest: No respiratory distress. Clear to auscultation bilaterally. No  "wheezing or rales.  Abdominal: Soft and non-distended.  There is no tenderness.  No rebound, guarding, or rigidity. Good bowel sounds.  Genitourinary: No CVA tenderness  Musculoskeletal: R knee pain with ROM. L plantar tenderness. No effusion. No erythema. No obvious deformities. No edema. No calf tenderness.  Skin: Warm and dry.  Neurological:  Alert, awake, and appropriate.  Normal speech.  No acute focal neurological deficits are appreciated.  Psychiatric: Normal affect. Good eye contact. Appropriate in content.     ED Course   Procedures  ED Vital Signs:  Vitals:    01/10/20 2141 01/10/20 2316   BP: 126/72 (!) 142/82   Pulse: 90 80   Resp: 18 16   Temp: 98.3 °F (36.8 °C) 98.9 °F (37.2 °C)   TempSrc: Oral Oral   SpO2: 96% 100%   Height: 5' 5" (1.651 m)        Abnormal Lab Results:  Labs Reviewed - No data to display     Imaging Results:  Imaging Results          US Lower Extremity Veins Bilateral (Final result)  Result time 01/10/20 22:45:58    Final result by Lawrence Cotton III, MD (01/10/20 22:45:58)                 Impression:      Negative for bilateral lower extremity DVT.      Electronically signed by: Lawrence Cotton MD  Date:    01/10/2020  Time:    22:45             Narrative:    EXAMINATION:  US LOWER EXTREMITY VEINS BILATERAL    CLINICAL HISTORY:  Other specified soft tissue disorders    FINDINGS:  Grayscale and color Doppler sonography was formed through the bilateral lower extremities.    The bilateral lower extremity veins are widely patent with normal augmentation, and compressibility and respiratory variability..  Of note, the peroneal veins were not seen                               X-Ray Hip 2 View Right (Final result)  Result time 01/10/20 22:13:37    Final result by Lawrence Cotton III, MD (01/10/20 22:13:37)                 Impression:      No acute abnormality suggested.  No detrimental change since November.      Electronically signed by: Lawrence Cotton, " MD  Date:    01/10/2020  Time:    22:13             Narrative:    EXAMINATION:  XR HIP 2 VIEW RIGHT    CLINICAL HISTORY:  hip pain;    COMPARISON:  November    FINDINGS:  Bony pelvis is grossly intact without fracture or diastasis.  Calcified fibroid again noted on the left.  Right hip is grossly unremarkable.  No fracture.  No dislocation.  No significant arthritic change.                                      The Emergency Provider reviewed the vital signs and test results, which are outlined above.     ED Discussion     11:04 PM: Reassessed pt at this time.  Pt states her condition has improved at this time. Discussed with pt all pertinent ED information and results. Discussed pt dx and plan of tx. Gave pt all f/u and return to the ED instructions. All questions and concerns were addressed at this time. Pt expresses understanding of information and instructions, and is comfortable with plan to discharge. Pt is stable for discharge.    I discussed with patient and/or family/caretaker that evaluation in the ED does not suggest any emergent or life threatening medical conditions requiring immediate intervention beyond what was provided in the ED, and I believe patient is safe for discharge.  Regardless, an unremarkable evaluation in the ED does not preclude the development or presence of a serious of life threatening condition. As such, patient was instructed to return immediately for any worsening or change in current symptoms.     Medical Decision Making:   Clinical Tests:   Radiological Study: Ordered and Reviewed           ED Medication(s):  Medications - No data to display    Discharge Medication List as of 1/10/2020 11:05 PM      START taking these medications    Details   HYDROcodone-acetaminophen (NORCO) 5-325 mg per tablet Take 1 tablet by mouth every 4 (four) hours as needed for Pain., Starting Fri 1/10/2020, Until Mon 1/20/2020, Print      naproxen (NAPROSYN) 375 MG tablet Take 1 tablet (375 mg total) by  mouth 2 (two) times daily with meals., Starting Fri 1/10/2020, Print             Follow-up Information     Leif Townsend MD In 3 days.    Specialty:  Family Medicine  Contact information:  [address]   [address]  [facility] [address] [phone]             [facility] .    Specialty:  Emergency Medicine  Why:  As needed, If symptoms worsen  Contact information:  [address]  [facility] [address] [phone]                     Scribe Attestation:   Scribe #1: I performed the above scribed service and the documentation accurately describes the services I performed. I attest to the accuracy of the note.     Attending:   Physician Attestation Statement for Scribe #1: I, Ricardo Abdul MD, personally performed the services described in this documentation, as scribed by Meena Snow, in my presence, and it is both accurate and complete.           Clinical Impression       ICD-10-CM ICD-9-CM   1. Pain in both lower extremities M79.604 729.5    M79.605    2. Leg swelling M79.89 729.81       Disposition:   Disposition: Discharged  Condition: Stable                 Ricardo Abdul MD  01/11/20 0549

## 2020-01-30 ENCOUNTER — TELEPHONE (OUTPATIENT)
Dept: ENDOSCOPY | Facility: HOSPITAL | Age: 75
End: 2020-01-30

## 2020-01-30 NOTE — TELEPHONE ENCOUNTER
Faxed info to GI clinic for appt. Pt was sent for blood in stool, H/H dropped . Provider name is Xena Reyes [phone]. dw

## 2020-04-28 ENCOUNTER — HOSPITAL ENCOUNTER (EMERGENCY)
Facility: HOSPITAL | Age: 75
Discharge: HOME | End: 2020-04-28
Attending: EMERGENCY MEDICINE
Payer: MEDICARE

## 2020-04-28 VITALS
HEIGHT: 65 IN | OXYGEN SATURATION: 100 % | TEMPERATURE: 98 F | WEIGHT: 221 LBS | BODY MASS INDEX: 36.82 KG/M2 | DIASTOLIC BLOOD PRESSURE: 68 MMHG | HEART RATE: 71 BPM | RESPIRATION RATE: 16 BRPM | SYSTOLIC BLOOD PRESSURE: 136 MMHG

## 2020-04-28 DIAGNOSIS — R60.9 EDEMA: ICD-10-CM

## 2020-04-28 DIAGNOSIS — M10.9 ACUTE GOUT OF LEFT KNEE, UNSPECIFIED CAUSE: Primary | ICD-10-CM

## 2020-04-28 LAB
ALBUMIN SERPL BCP-MCNC: 3.1 G/DL (ref 3.5–5.2)
ALP SERPL-CCNC: 110 U/L (ref 55–135)
ALT SERPL W/O P-5'-P-CCNC: 6 U/L (ref 10–44)
ANION GAP SERPL CALC-SCNC: 9 MMOL/L (ref 8–16)
APTT BLDCRRT: 25.8 SEC (ref 21–32)
AST SERPL-CCNC: 9 U/L (ref 10–40)
BASOPHILS # BLD AUTO: 0.04 K/UL (ref 0–0.2)
BASOPHILS NFR BLD: 0.4 % (ref 0–1.9)
BILIRUB SERPL-MCNC: 0.4 MG/DL (ref 0.1–1)
BUN SERPL-MCNC: 12 MG/DL (ref 8–23)
CALCIUM SERPL-MCNC: 10.6 MG/DL (ref 8.7–10.5)
CHLORIDE SERPL-SCNC: 104 MMOL/L (ref 95–110)
CO2 SERPL-SCNC: 23 MMOL/L (ref 23–29)
CREAT SERPL-MCNC: 1.1 MG/DL (ref 0.5–1.4)
CRP SERPL-MCNC: 19.1 MG/L (ref 0–8.2)
DIFFERENTIAL METHOD: ABNORMAL
EOSINOPHIL # BLD AUTO: 0.3 K/UL (ref 0–0.5)
EOSINOPHIL NFR BLD: 3.4 % (ref 0–8)
ERYTHROCYTE [DISTWIDTH] IN BLOOD BY AUTOMATED COUNT: 12.9 % (ref 11.5–14.5)
EST. GFR  (AFRICAN AMERICAN): 57 ML/MIN/1.73 M^2
EST. GFR  (NON AFRICAN AMERICAN): 49 ML/MIN/1.73 M^2
GLUCOSE SERPL-MCNC: 101 MG/DL (ref 70–110)
HCT VFR BLD AUTO: 34.3 % (ref 37–48.5)
HGB BLD-MCNC: 10.8 G/DL (ref 12–16)
IMM GRANULOCYTES # BLD AUTO: 0.04 K/UL (ref 0–0.04)
IMM GRANULOCYTES NFR BLD AUTO: 0.4 % (ref 0–0.5)
INR PPP: 0.9 (ref 0.8–1.2)
LYMPHOCYTES # BLD AUTO: 2.6 K/UL (ref 1–4.8)
LYMPHOCYTES NFR BLD: 27.3 % (ref 18–48)
MCH RBC QN AUTO: 28.3 PG (ref 27–31)
MCHC RBC AUTO-ENTMCNC: 31.5 G/DL (ref 32–36)
MCV RBC AUTO: 90 FL (ref 82–98)
MONOCYTES # BLD AUTO: 0.8 K/UL (ref 0.3–1)
MONOCYTES NFR BLD: 8.4 % (ref 4–15)
NEUTROPHILS # BLD AUTO: 5.7 K/UL (ref 1.8–7.7)
NEUTROPHILS NFR BLD: 60.1 % (ref 38–73)
NRBC BLD-RTO: 0 /100 WBC
PLATELET # BLD AUTO: 347 K/UL (ref 150–350)
PMV BLD AUTO: 9.8 FL (ref 9.2–12.9)
POTASSIUM SERPL-SCNC: 3.9 MMOL/L (ref 3.5–5.1)
PROT SERPL-MCNC: 7.7 G/DL (ref 6–8.4)
PROTHROMBIN TIME: 10 SEC (ref 9–12.5)
RBC # BLD AUTO: 3.82 M/UL (ref 4–5.4)
SODIUM SERPL-SCNC: 136 MMOL/L (ref 136–145)
URATE SERPL-MCNC: 11.3 MG/DL (ref 2.4–5.7)
WBC # BLD AUTO: 9.54 K/UL (ref 3.9–12.7)

## 2020-04-28 PROCEDURE — 84550 ASSAY OF BLOOD/URIC ACID: CPT

## 2020-04-28 PROCEDURE — 96375 TX/PRO/DX INJ NEW DRUG ADDON: CPT

## 2020-04-28 PROCEDURE — 63600175 PHARM REV CODE 636 W HCPCS: Performed by: EMERGENCY MEDICINE

## 2020-04-28 PROCEDURE — 86140 C-REACTIVE PROTEIN: CPT

## 2020-04-28 PROCEDURE — 96374 THER/PROPH/DIAG INJ IV PUSH: CPT

## 2020-04-28 PROCEDURE — 85610 PROTHROMBIN TIME: CPT

## 2020-04-28 PROCEDURE — 85025 COMPLETE CBC W/AUTO DIFF WBC: CPT

## 2020-04-28 PROCEDURE — 36415 COLL VENOUS BLD VENIPUNCTURE: CPT

## 2020-04-28 PROCEDURE — 80053 COMPREHEN METABOLIC PANEL: CPT

## 2020-04-28 PROCEDURE — 85730 THROMBOPLASTIN TIME PARTIAL: CPT

## 2020-04-28 PROCEDURE — 99284 EMERGENCY DEPT VISIT MOD MDM: CPT | Mod: 25

## 2020-04-28 RX ORDER — ONDANSETRON 2 MG/ML
4 INJECTION INTRAMUSCULAR; INTRAVENOUS
Status: COMPLETED | OUTPATIENT
Start: 2020-04-28 | End: 2020-04-28

## 2020-04-28 RX ORDER — MORPHINE SULFATE 4 MG/ML
4 INJECTION, SOLUTION INTRAMUSCULAR; INTRAVENOUS
Status: COMPLETED | OUTPATIENT
Start: 2020-04-28 | End: 2020-04-28

## 2020-04-28 RX ORDER — HYDROCODONE BITARTRATE AND ACETAMINOPHEN 5; 325 MG/1; MG/1
1 TABLET ORAL EVERY 6 HOURS PRN
Qty: 9 TABLET | Refills: 0 | OUTPATIENT
Start: 2020-04-28 | End: 2020-07-21

## 2020-04-28 RX ADMIN — MORPHINE SULFATE 4 MG: 4 INJECTION INTRAVENOUS at 03:04

## 2020-04-28 RX ADMIN — ONDANSETRON 4 MG: 2 INJECTION INTRAMUSCULAR; INTRAVENOUS at 03:04

## 2020-04-28 NOTE — DISCHARGE INSTRUCTIONS
Ibuprofen 4 mg orally 3 times daily on a schedule for 7 days.  Use Norco for breakthrough pain.  See her doctor in 1-2 days for re-evaluation.  Return as needed for any worsening symptoms, problems, questions or concerns.

## 2020-04-28 NOTE — ED NOTES
I called and spoke with the pt's son, William [phone].  He will come  the pt and take her home.

## 2020-05-19 ENCOUNTER — OFFICE VISIT (OUTPATIENT)
Dept: OPHTHALMOLOGY | Facility: CLINIC | Age: 75
End: 2020-05-19
Payer: MEDICARE

## 2020-05-19 DIAGNOSIS — H10.13 ALLERGIC CONJUNCTIVITIS, BILATERAL: Primary | ICD-10-CM

## 2020-05-19 DIAGNOSIS — H04.123 DRY EYES, BILATERAL: ICD-10-CM

## 2020-05-19 PROCEDURE — 92002 INTRM OPH EXAM NEW PATIENT: CPT | Mod: S$GLB,,, | Performed by: OPTOMETRIST

## 2020-05-19 PROCEDURE — 99999 PR PBB SHADOW E&M-EST. PATIENT-LVL I: CPT | Mod: PBBFAC,,, | Performed by: OPTOMETRIST

## 2020-05-19 PROCEDURE — 99999 PR PBB SHADOW E&M-EST. PATIENT-LVL I: ICD-10-PCS | Mod: PBBFAC,,, | Performed by: OPTOMETRIST

## 2020-05-19 PROCEDURE — 92002 PR EYE EXAM, NEW PATIENT,INTERMED: ICD-10-PCS | Mod: S$GLB,,, | Performed by: OPTOMETRIST

## 2020-05-19 NOTE — PROGRESS NOTES
HPI     NP to TRF  Patient here today for itchy eyes associated with tearing, mild pain and   am crusting  Patient states symptoms having been present for 2 weeks  Using OTC drops  Pain is rated at 5 today    1. PCIOL OU ([facility])    Last edited by Ruby Oliva, PCT on 5/19/2020  1:51 PM. (History)              Assessment /Plan     For exam results, see Encounter Report.    Allergic conjunctivitis, bilateral    Dry eyes, bilateral      Pataday OTC     AT qid or more OU    RTC prn

## 2020-05-26 ENCOUNTER — HOSPITAL ENCOUNTER (EMERGENCY)
Facility: HOSPITAL | Age: 75
Discharge: HOME | End: 2020-05-26
Attending: EMERGENCY MEDICINE
Payer: MEDICARE

## 2020-05-26 VITALS
RESPIRATION RATE: 20 BRPM | HEIGHT: 65 IN | OXYGEN SATURATION: 100 % | HEART RATE: 68 BPM | BODY MASS INDEX: 36.8 KG/M2 | SYSTOLIC BLOOD PRESSURE: 122 MMHG | DIASTOLIC BLOOD PRESSURE: 64 MMHG | WEIGHT: 220.88 LBS | TEMPERATURE: 98 F

## 2020-05-26 DIAGNOSIS — M25.552 LEFT HIP PAIN: ICD-10-CM

## 2020-05-26 DIAGNOSIS — M54.32 SCIATICA OF LEFT SIDE: Primary | ICD-10-CM

## 2020-05-26 PROCEDURE — 99283 EMERGENCY DEPT VISIT LOW MDM: CPT | Mod: 25

## 2020-05-26 PROCEDURE — 25000003 PHARM REV CODE 250: Performed by: PHYSICIAN ASSISTANT

## 2020-05-26 RX ORDER — ACETAMINOPHEN 325 MG/1
650 TABLET ORAL
Status: COMPLETED | OUTPATIENT
Start: 2020-05-26 | End: 2020-05-26

## 2020-05-26 RX ORDER — HYDROCODONE BITARTRATE AND ACETAMINOPHEN 5; 325 MG/1; MG/1
1 TABLET ORAL
Status: COMPLETED | OUTPATIENT
Start: 2020-05-26 | End: 2020-05-26

## 2020-05-26 RX ORDER — TRAMADOL HYDROCHLORIDE 50 MG/1
50 TABLET ORAL EVERY 6 HOURS PRN
Qty: 15 TABLET | Refills: 0 | OUTPATIENT
Start: 2020-05-26 | End: 2020-07-21

## 2020-05-26 RX ADMIN — HYDROCODONE BITARTRATE AND ACETAMINOPHEN 1 TABLET: 5; 325 TABLET ORAL at 07:05

## 2020-05-26 RX ADMIN — ACETAMINOPHEN 650 MG: 325 TABLET ORAL at 07:05

## 2020-05-26 NOTE — ED NOTES
Patient identifiers verified and correct for Dilia Talley.    Pt c/o L hip and L knee pain x 2-3 days. Denies any falls or injury. Pt able to move extremity. + pulses noted.     LOC: The patient is awake, alert and aware of environment with an appropriate affect, the patient is oriented x 3 and speaking appropriately.  APPEARANCE: Patient resting comfortably and in no acute distress, patient is clean and well groomed, patient's clothing is properly fastened.  SKIN: The skin is warm and dry, color consistent with ethnicity, patient has normal skin turgor and moist mucus membranes, skin intact, no breakdown or bruising noted.  MUSCULOSKELETAL: Patient moving all extremities spontaneously.  RESPIRATORY: Airway is open and patent, respirations are spontaneous.  CARDIAC: Patient has a normal rate, no periphreal edema noted, capillary refill < 3 seconds.  ABDOMEN: Soft and non tender to palpation.

## 2020-05-27 ENCOUNTER — PES CALL (OUTPATIENT)
Dept: ADMINISTRATIVE | Facility: CLINIC | Age: 75
End: 2020-05-27

## 2020-05-27 NOTE — ED PROVIDER NOTES
History      Chief Complaint   Patient presents with    Hip Pain     L hip pain and L knee pain x 2-3 days. pt denies fall or any other injury.        Review of patient's allergies indicates:  No Known Allergies     HPI   HPI    5/26/2020, 7:19 PM   History obtained from the patient      History of Present Illness: Dilia Talley is a 73 y.o. female patient who presents to the Emergency Department for left sacral pain that radiates down left buttock to knee. Symptoms are constant and moderate in severity.  The patient describes the symptoms as achy.  Denies injury, bladder/bowel dysfunction, fever, saddle anesthesia, or focal weakness.   No further complaints or concerns at this time.           PCP: Leif Townsend MD       Past Medical History:  Past Medical History:   Diagnosis Date    Diabetes     HTN (hypertension)     Neuropathy     Obese          Past Surgical History:  Past Surgical History:   Procedure Laterality Date    UMBILICAL HERNIA REPAIR             Family History:  Family History   Problem Relation Age of Onset    Diabetes Sister     Hypertension Sister            Social History:  Social History     Tobacco Use    Smoking status: Never Smoker    Smokeless tobacco: Never Used   Substance and Sexual Activity    Alcohol use: No    Drug use: No    Sexual activity: Not Currently     Partners: Male     Birth control/protection: None       ROS   Review of Systems   Constitutional: Negative for chills and fever.   HENT: Negative for sore throat.    Respiratory: Negative for shortness of breath.    Cardiovascular: Negative for chest pain.   Gastrointestinal: Negative for nausea and vomiting.   Genitourinary: Negative for decreased urine volume, difficulty urinating, dysuria and flank pain.   Musculoskeletal: Positive for low back and hip pain. Negative for neck stiffness.   Skin: Negative for rash and wound.   Neurological: Negative for weakness and numbness.   Hematological: Does not bruise/bleed  "easily.   All other systems reviewed and are negative.    Review of Systems    Physical Exam      Initial Vitals [05/26/20 1817]   BP Pulse Resp Temp SpO2   122/64 68 20 97.9 °F (36.6 °C) 100 %      MAP       --         Physical Exam  Vital signs and nursing notes reviewed.  Constitutional: Patient is in NAD. Awake and alert. Well-developed and well-nourished.  Head: Atraumatic. Normocephalic.  Eyes: PERRL. EOM intact. Conjunctivae nl. No scleral icterus.  ENT: Mucous membranes are moist. Oropharynx is clear.  Neck: Supple. No JVD. No lymphadenopathy.  No meningismus.  Nontender  Cardiovascular: Regular rate and rhythm. No murmurs, rubs, or gallops. Distal pulses are 2+ and symmetric.  Pulmonary/Chest: No respiratory distress. Clear to auscultation bilaterally. No wheezing, rales, or rhonchi.  Abdominal: Soft. Non-distended. No TTP. No rebound, guarding, or rigidity. Good bowel sounds.  Genitourinary: No CVA tenderness  Musculoskeletal: Moves all extremities. No leg edema.   Non tender c/t spine.  L/S spine with mild bilateral paraspinous ttp, no midline ttp or step.  Left hip and knee with from, no heat or erythema.  Skin: Warm and dry.  Neurological: Awake and alert. No acute focal neurological deficits are appreciated.  5/5 x 4 strength.  Strong and equal foot plantar and dorsiflexion.  Psychiatric: Normal affect. Good eye contact. Appropriate in content.      ED Course      Procedures  ED Vital Signs:  Vitals:    05/26/20 1817   BP: 122/64   Pulse: 68   Resp: 20   Temp: 97.9 °F (36.6 °C)   TempSrc: Oral   SpO2: 100%   Weight: 100.2 kg (220 lb 14.4 oz)   Height: 5' 5" (1.651 m)               Imaging Results:  Imaging Results          X-Ray Hip 2 View Left (Final result)  Result time 05/26/20 19:01:57    Final result by Oliver Potts MD (05/26/20 19:01:57)                 Impression:      No acute abnormality identified.      Electronically signed by: Oliver Potts  Date:    05/26/2020  Time:    19:01             " Narrative:    EXAMINATION:  XR HIP 2 VIEW LEFT    CLINICAL HISTORY:  Pain in left hip    TECHNIQUE:  AP view of the pelvis and frog leg lateral view of the left hip were performed.    COMPARISON:  01/10/2020    FINDINGS:  No evidence of fracture or dislocation.  Mild sclerosis of the bilateral SI joints.  Hip joints appear well maintained.  Calcified fibroid in the left pelvis..                               X-Ray Lumbar Spine Ap And Lateral (Final result)  Result time 05/26/20 19:00:38    Final result by Jeff Charles MD (05/26/20 19:00:38)                 Impression:      1. Mild scoliosis, convex to the left.  No change since 12/16/2019.  2. Mild degenerative disc disease.  Advanced degenerative change of the L4-5 and L5-S1 facet joints.  3. Overall no significant change since the prior exam dated 12/16/2019.      Electronically signed by: Jeff Charles  Date:    05/26/2020  Time:    19:00             Narrative:    EXAMINATION:  XR LUMBAR SPINE AP AND LATERAL    CLINICAL HISTORY:  Low back pain, <6wks, no red flags, no prior management;    COMPARISON:  12/16/2019    FINDINGS:  Mild scoliosis, convex to the left and centered at L3-L4. All lumbar vertebral bodies are normal in height.  Diffuse osteopenia.Mild disc space narrowing identified L3-L4 with endplate sclerosis and small marginal osteophytes.  Small marginal osteophytes L4-5.  Remaining disc spaces are well preserved.  Advanced degenerative change of the L4-5 and L5-S1 facet joints.  Paravertebral soft tissues are normal.                                   The Emergency Provider reviewed the vital signs and test results, which are outlined above.    ED Discussion         Medication(s) given in the ER:  Medications   HYDROcodone-acetaminophen 5-325 mg per tablet 1 tablet (1 tablet Oral Given 5/26/20 1901)   acetaminophen tablet 650 mg (650 mg Oral Given 5/26/20 1903)           Follow-up Information     Leif Townsend MD In 2 days.    Specialty:   Family Medicine  Contact information:  [address]   [address]  [facility] [address]  [phone]                       New Prescriptions    TRAMADOL (ULTRAM) 50 MG TABLET    Take 1 tablet (50 mg total) by mouth every 6 (six) hours as needed.          Medical Decision Making        All findings were reviewed with the patient/family in detail.   All remaining questions and concerns were addressed at that time.  Patient/family has been counseled regarding the need for follow-up as well as the indication to return to the emergency room should new or worrisome developments occur.          MDM               Clinical Impression:        ICD-10-CM ICD-9-CM   1. Sciatica of left side M54.32 724.3   2. Left hip pain M25.552 719.45               Aleksandra Restrepo PA-C  05/26/20 1920

## 2020-05-27 NOTE — ED NOTES
Patient educated on discharge prescriptions and instructions by RODNEY. Patient discharged to lobby by RODNEY.

## 2020-05-28 ENCOUNTER — OFFICE VISIT (OUTPATIENT)
Dept: PODIATRY | Facility: CLINIC | Age: 75
End: 2020-05-28
Payer: MEDICARE

## 2020-05-28 VITALS — DIASTOLIC BLOOD PRESSURE: 69 MMHG | HEART RATE: 60 BPM | SYSTOLIC BLOOD PRESSURE: 116 MMHG

## 2020-05-28 DIAGNOSIS — M21.40 ACQUIRED PES PLANOVALGUS, UNSPECIFIED LATERALITY: ICD-10-CM

## 2020-05-28 DIAGNOSIS — E11.49 DIABETES MELLITUS TYPE 2 WITH NEUROLOGICAL MANIFESTATIONS: ICD-10-CM

## 2020-05-28 DIAGNOSIS — B35.1 ONYCHOMYCOSIS DUE TO DERMATOPHYTE: ICD-10-CM

## 2020-05-28 DIAGNOSIS — E11.9 ENCOUNTER FOR COMPREHENSIVE DIABETIC FOOT EXAMINATION, TYPE 2 DIABETES MELLITUS: Primary | ICD-10-CM

## 2020-05-28 DIAGNOSIS — R60.0 BILATERAL EDEMA OF LOWER EXTREMITY: ICD-10-CM

## 2020-05-28 DIAGNOSIS — E66.01 SEVERE OBESITY: ICD-10-CM

## 2020-05-28 PROCEDURE — 1159F PR MEDICATION LIST DOCUMENTED IN MEDICAL RECORD: ICD-10-PCS | Mod: S$GLB,,, | Performed by: PODIATRIST

## 2020-05-28 PROCEDURE — 99203 PR OFFICE/OUTPT VISIT, NEW, LEVL III, 30-44 MIN: ICD-10-PCS | Mod: 25,S$GLB,, | Performed by: PODIATRIST

## 2020-05-28 PROCEDURE — 1126F AMNT PAIN NOTED NONE PRSNT: CPT | Mod: S$GLB,,, | Performed by: PODIATRIST

## 2020-05-28 PROCEDURE — 1101F PT FALLS ASSESS-DOCD LE1/YR: CPT | Mod: CPTII,S$GLB,, | Performed by: PODIATRIST

## 2020-05-28 PROCEDURE — 1126F PR PAIN SEVERITY QUANTIFIED, NO PAIN PRESENT: ICD-10-PCS | Mod: S$GLB,,, | Performed by: PODIATRIST

## 2020-05-28 PROCEDURE — 1159F MED LIST DOCD IN RCRD: CPT | Mod: S$GLB,,, | Performed by: PODIATRIST

## 2020-05-28 PROCEDURE — 99999 PR PBB SHADOW E&M-EST. PATIENT-LVL III: CPT | Mod: PBBFAC,,, | Performed by: PODIATRIST

## 2020-05-28 PROCEDURE — 99999 PR PBB SHADOW E&M-EST. PATIENT-LVL III: ICD-10-PCS | Mod: PBBFAC,,, | Performed by: PODIATRIST

## 2020-05-28 PROCEDURE — 1101F PR PT FALLS ASSESS DOC 0-1 FALLS W/OUT INJ PAST YR: ICD-10-PCS | Mod: CPTII,S$GLB,, | Performed by: PODIATRIST

## 2020-05-28 PROCEDURE — 11721 DEBRIDE NAIL 6 OR MORE: CPT | Mod: Q9,S$GLB,, | Performed by: PODIATRIST

## 2020-05-28 PROCEDURE — 99203 OFFICE O/P NEW LOW 30 MIN: CPT | Mod: 25,S$GLB,, | Performed by: PODIATRIST

## 2020-05-28 PROCEDURE — 11721 PR DEBRIDEMENT OF NAILS, 6 OR MORE: ICD-10-PCS | Mod: Q9,S$GLB,, | Performed by: PODIATRIST

## 2020-05-28 NOTE — PROGRESS NOTES
2Subjective:       Patient ID: Dilia Talley is a 73 y.o. female.    Chief Complaint: Routine Foot Care (denies pain at present.  socks with shoes and inserts worn today in clinic. edema noted to feet, thick yellow nails also noted at this time. PCp Leif ortiz, patient states she saw him two months ago and has upcoming appointment on 6/2/2020 at 10:30am with Dr. Ortiz.) and Diabetic Foot Exam (patient is a diabetic. Says she needs shoes also. )      HPI: Dilia Talley presents to the office today, under referral by their Primary Care Provider, Leif Ortiz MD, for her annual diabetic foot assessment and risk evaluation.  Patient is a DMII. Patient states neuropathy and lymphedema.  She complains of thickened, yellow, painful toenails on the right foot and the left foot.  She has not attempted any over-the-counter medications for this.  This patient last saw his/her primary care provider on 2 months ago but patient does have a scheduled appointment on 06/02/2020 at 10:30 a.m. with Dr. Ortiz..     No results found for: HGBA1C.    Review of patient's allergies indicates:  No Known Allergies    Past Medical History:   Diagnosis Date    Diabetes     HTN (hypertension)     Neuropathy     Obese        Family History   Problem Relation Age of Onset    Diabetes Sister     Hypertension Sister        Social History     Socioeconomic History    Marital status:      Spouse name: Not on file    Number of children: Not on file    Years of education: Not on file    Highest education level: Not on file   Occupational History    Not on file   Social Needs    Financial resource strain: Not on file    Food insecurity:     Worry: Not on file     Inability: Not on file    Transportation needs:     Medical: Not on file     Non-medical: Not on file   Tobacco Use    Smoking status: Never Smoker    Smokeless tobacco: Never Used   Substance and Sexual Activity    Alcohol use: No    Drug use: No    Sexual  activity: Not Currently     Partners: Male     Birth control/protection: None   Lifestyle    Physical activity:     Days per week: Not on file     Minutes per session: Not on file    Stress: Not on file   Relationships    Social connections:     Talks on phone: Not on file     Gets together: Not on file     Attends Zoroastrian service: Not on file     Active member of club or organization: Not on file     Attends meetings of clubs or organizations: Not on file     Relationship status: Not on file   Other Topics Concern    Not on file   Social History Narrative    Not on file       Past Surgical History:   Procedure Laterality Date    UMBILICAL HERNIA REPAIR         Review of Systems   Constitutional: Negative for activity change, appetite change, chills and fever.   HENT: Negative for sinus pain, sore throat and voice change.    Eyes: Negative for pain, redness and visual disturbance.   Respiratory: Negative for cough and shortness of breath.    Cardiovascular: Positive for leg swelling. Negative for chest pain and palpitations.   Gastrointestinal: Negative for diarrhea, nausea and vomiting.   Musculoskeletal: Positive for arthralgias, back pain, gait problem and joint swelling.   Skin: Negative for color change and wound.   Neurological: Positive for weakness and numbness. Negative for dizziness.   Psychiatric/Behavioral: The patient is not nervous/anxious.          Objective:   /69   Pulse 60     Physical Exam  LOWER EXTREMITY PHYSICAL EXAMINATION    ORTHOPEDIC:   No pain on palpation of the foot or ankle.   Range of motion within normal limits of the ankle joint and forefoot.  Pes planus foot deformity noted bilaterally.  Decreased range of motion within the rearfoot.  Manual muscle strength testing is 5/5 with dorsiflexion, plantar flexion, abduction and abduction of the lower extremity.  No pain with or without resistance.  The patient is a full to ambulate without pain or discomfort.  Patient  ambulates in a wheelchair.    VASCULAR: Capillary refill time is less than 3s. Edema is 3+ pitting and severe. The left dorsalis pedis pulse is 1/4 and the right dorsalis pedis pulse is 1/4. The left posterior tibial pulse is 0/4 and the right posterior tibial pulse is 0/4. Varicosities are absent. Spider veins and telangiectasias are noted.  Hair growth is sparse to absent. Skin appearance is thin and shiny. Proximal to distal warm to cool. Elevation palor is noted. Dependent rubor is noted.    NEUROLOGY: Proprioception is intact. Sensation to light touch is  intact. Sensation to pin prick is reduced. Vibratory sensation is reduced absent to the left and right lower extremity. Examination with 5.07 Wagner Erik monofilament reveals that protective sensation is not intact to the left and right plantar surfaces of the foot and digits, as the patient has no sensation/detection at greater than 4 distinct points of contact.     DERMATOLOGY: Skin is dry, atrophic, thin.  There is no calluses or ulcerations noted to the bilateral lower extremity.  There is no maceration noted within the interdigital space on the right foot or left foot.  The nails 1, 2, 3, 4, 5 on the right foot and 1, 2, 3, 4, 5 on the left foot are thickened, discolored, dystrophic, malodorous with subungual debris.  There is no evidence of erythema present.  There is generalized +3 pitting edema noted to the pretibial regions bilaterally.    Assessment:     1. Encounter for comprehensive diabetic foot examination, type 2 diabetes mellitus    2. Diabetes mellitus type 2 with neurological manifestations    3. Bilateral edema of lower extremity    4. Acquired pes planovalgus, unspecified laterality    5. Severe obesity    6. Onychomycosis due to dermatophyte        Plan:     Diabetes mellitus type 2 with neurological manifestations  -     DIABETIC SHOES FOR HOME USE  -     HME - OTHER  I counseled the patient on his/her Diabetic Mellitus regarding  today's clinical examination and objection findings. We did also discuss recent medication changes, pertinent labs and imaging evaluations and other medical consultation notes and progress notes. Greater than 50% of this visit was spent on counseling and coordination of care. Greater than 20 minutes of this appt. was spent on education about the diabetic foot, in relation to PVD and/or neuropathy, and the prevention of limb loss.     Shoe gear is inspected and wear and proper fit/type. Patient is reminded of the importance of good nutrition and blood sugar control to help prevent podiatric complications of diabetes. Patient instructed on proper foot hygeine. We discussed wearing proper shoe gear, daily foot inspections, never walking without protective shoe gear, never putting sharp instruments to feet.  Patient  will continue to monitor the areas daily, inspect feet, wear protective shoe gear when ambulatory, moisturizer to maintain skin integrity.     Patient's DMI/DMII is managed by Primary Care Provider and/or Endocrinology Advanced Practice Provider.    Bilateral edema of lower extremity  -     HME - OTHER  Prescription for compression socks was given to the patient to help control her bilateral lower extremity edema.  Encourage patient to discuss this also with her PCP at her next scheduled appointment on Tuesday 06/02/2020 to consider increase in Lasix  Acquired pes planovalgus, unspecified laterality  -     DIABETIC SHOES FOR HOME USE    Severe obesity   Patient is counseled and reminded concerning the importance of good nutrition and healthy eating habits, which may include blood sugar control, to prevent and/or help podiatric foot and ankle complications.  Onychomycosis due to dermatophyte  We discussed medical options in regarding to onychomycosis.  We did discuss utilizing oral medications, topical medications, fungal creams, and Vicks vapor rub.  We also discussed performing an avulsion/matricectomy to  remove the offending nail.  Patient states that they would consider these options put would like to manage these conservatively with debridements.    The onychomycotic nail plates, as outlined above (R# 1, 2, 3, 4, 5; L# 1, 2, 3, 4, 5) are sharply debrided with double action nail nipper, and/or with the assistance of a mechanical rotary zenon, with removal of all offending nail and nail border(s), for reduction of pains. Nails are reduced in terms of length, width and girth with removal of subungual debris to facilitate pain free weight bearing and ambulation. The elongated nails as outlined in the objective portion of this note, were trimmed to appropriate length, with a double action nail nipper, for alleviation/reduction of pains as well. Follow up in approx. 3-4 months.         Protective Sensation (w/ 10 gram monofilament):  Right: Absent  Left: Absent    Visual Inspection:  Onychomycosis -  Bilateral    Pedal Pulses:   Right: Diminshed  Left: Diminshed    Posterior tibialis:   Right:Absent  Left: Absent

## 2020-06-02 ENCOUNTER — HOSPITAL ENCOUNTER (EMERGENCY)
Facility: HOSPITAL | Age: 75
Discharge: HOME | End: 2020-06-02
Attending: EMERGENCY MEDICINE
Payer: MEDICARE

## 2020-06-02 VITALS
RESPIRATION RATE: 16 BRPM | HEART RATE: 63 BPM | BODY MASS INDEX: 36.76 KG/M2 | DIASTOLIC BLOOD PRESSURE: 58 MMHG | SYSTOLIC BLOOD PRESSURE: 138 MMHG | OXYGEN SATURATION: 99 % | HEIGHT: 65 IN | TEMPERATURE: 98 F

## 2020-06-02 DIAGNOSIS — M79.89 LEG SWELLING: Primary | ICD-10-CM

## 2020-06-02 PROCEDURE — 96374 THER/PROPH/DIAG INJ IV PUSH: CPT

## 2020-06-02 PROCEDURE — 63600175 PHARM REV CODE 636 W HCPCS: Performed by: NURSE PRACTITIONER

## 2020-06-02 PROCEDURE — 96361 HYDRATE IV INFUSION ADD-ON: CPT

## 2020-06-02 PROCEDURE — 99284 EMERGENCY DEPT VISIT MOD MDM: CPT | Mod: 25

## 2020-06-02 RX ORDER — HYDROMORPHONE HYDROCHLORIDE 2 MG/ML
0.5 INJECTION, SOLUTION INTRAMUSCULAR; INTRAVENOUS; SUBCUTANEOUS
Status: COMPLETED | OUTPATIENT
Start: 2020-06-02 | End: 2020-06-02

## 2020-06-02 RX ADMIN — HYDROMORPHONE HYDROCHLORIDE 0.5 MG: 2 INJECTION INTRAMUSCULAR; INTRAVENOUS; SUBCUTANEOUS at 08:06

## 2020-06-02 RX ADMIN — CEFTRIAXONE 1 G: 1 INJECTION, SOLUTION INTRAVENOUS at 08:06

## 2020-06-03 NOTE — ED NOTES
jim Thomas son, contact number [phone]   Detail Level: Detailed Introduction Text (Please End With A Colon): The following procedure was deferred:

## 2020-06-03 NOTE — ED PROVIDER NOTES
SCRIBE #1 NOTE: I, Santo Monreal, am scribing for, and in the presence of, Columba Lorenzo MD. I have scribed the entire note.       History     Chief Complaint   Patient presents with    Leg Swelling     Left. Sent by PA at PCP for evaluation of leg to see if pt has blood clot.      Review of patient's allergies indicates:  No Known Allergies      History of Present Illness     HPI    6/2/2020, 7:48 PM  History obtained from the patient      History of Present Illness: Dilia Talley is a 73 y.o. female patient with a history of DM, HTN, obesity, neuropathy who presents to the Emergency Department for evaluation of left lower leg pain which onset several days ago. Symptoms are constant and moderate in severity. No mitigating or exacerbating factors reported. Associated sxs include left lower leg swelling. Patient denies any fever, chills, n/v, cp, sob, and all other sxs at this time. Prior Tx includes none. No further complaints or concerns at this time. Patient referred by PCP's PA for DVT rule out.      Arrival mode: Personal transportation      PCP: Leif Townsend MD      Past Medical History:  Past Medical History:   Diagnosis Date    Diabetes     HTN (hypertension)     Neuropathy     Obese        Past Surgical History:  Past Surgical History:   Procedure Laterality Date    UMBILICAL HERNIA REPAIR           Family History:  Family History   Problem Relation Age of Onset    Diabetes Sister     Hypertension Sister        Social History:   Social History     Tobacco Use    Smoking status: Never Smoker    Smokeless tobacco: Never Used   Substance and Sexual Activity    Alcohol use: No    Drug use: No    Sexual activity: Not Currently     Partners: Male     Birth control/protection: None        Review of Systems     Review of Systems   Constitutional: Negative for chills and fever.   HENT: Negative for sore throat.    Respiratory: Negative for shortness of breath.    Cardiovascular: Positive for leg  swelling. Negative for chest pain.   Gastrointestinal: Negative for nausea and vomiting.   Genitourinary: Negative for dysuria.   Musculoskeletal: Negative for back pain.        + leg pain   Skin: Negative for rash.   Neurological: Negative for weakness.   Hematological: Does not bruise/bleed easily.   All other systems reviewed and are negative.       Physical Exam     Initial Vitals [06/02/20 1927]   BP Pulse Resp Temp SpO2   132/62 65 18 98.6 °F (37 °C) 99 %      MAP       --          Physical Exam  Nursing Notes and Vital Signs Reviewed.  Constitutional: Well-developed and morbidly obese. Patient is in NAD.  Head: Atraumatic. Normocephalic.  Eyes: PERRL. EOM intact. Conjunctivae are not pale. No scleral icterus.  ENT: Mucous membranes are moist. Oropharynx is clear and symmetric.    Neck: Supple. Full ROM. No lymphadenopathy.  Cardiovascular: Regular rate. Regular rhythm. No murmurs, rubs, or gallops. Distal pulses are 2+ and symmetric. 2+ edema to LLE.  Pulmonary/Chest: No respiratory distress. Clear to auscultation bilaterally. No wheezing or rales.  Abdominal: Soft and non-distended.  There is no tenderness.  No rebound, guarding, or rigidity. Good bowel sounds.  Genitourinary: No CVA tenderness  Musculoskeletal: Moves all extremities. No obvious deformities. No calf tenderness.  LLE: 2+ edema. Cap refill distally is <2 seconds. DP and PT pulses are equal and 2+ bilaterally. No motor deficit. No distal sensory deficit  Skin: Warm and dry.  Neurological:  Alert, awake, and appropriate.  Normal speech.  No acute focal neurological deficits are appreciated.  Psychiatric: Normal affect. Good eye contact. Appropriate in content.     ED Course   Procedures  ED Vital Signs:  Vitals:    06/02/20 1927 06/02/20 2021 06/02/20 2056 06/02/20 2116   BP: 132/62 (!) 141/81 (!) 152/77 (!) 138/58   Pulse: 65 69 69 63   Resp: 18 20 18 16   Temp: 98.6 °F (37 °C)   98.4 °F (36.9 °C)   TempSrc: Oral   Oral   SpO2: 99% 99% 99% 99%  "  Height: 5' 5" (1.651 m)            Imaging Results          US Lower Extremity Veins Left (Final result)  Result time 06/02/20 20:49:15    Final result by Jeff Charles MD (06/02/20 20:49:15)                 Impression:      Negative for left lower extremity DVT.  No change since 04/28/2020.      Electronically signed by: Jeff Charles  Date:    06/02/2020  Time:    20:49             Narrative:    EXAMINATION:  US LOWER EXTREMITY VEINS LEFT    CLINICAL HISTORY:  Left lower extremity pain.    COMPARISON:  04/28/2020    FINDINGS:  The deep veins demonstrate a normal appearance of the common femoral vein, deep profunda, superficial femoral and popliteal vein.  There is no evidence of deep venous thrombosis.  Additionally, the greater saphenous, tibial and peroneal veins are patent.                                   The Emergency Provider reviewed the vital signs and test results, which are outlined above.     ED Discussion       8:37 PM: Reassessed pt at this time.  Pt states her condition has improved at this time. Discussed with pt all pertinent ED information and results. Discussed pt dx and plan of tx. Gave pt all f/u and return to the ED instructions. All questions and concerns were addressed at this time. Pt expresses understanding of information and instructions, and is comfortable with plan to discharge. Pt is stable for discharge.    I discussed with patient and/or family/caretaker that evaluation in the ED does not suggest any emergent or life threatening medical conditions requiring immediate intervention beyond what was provided in the ED, and I believe patient is safe for discharge.  Regardless, an unremarkable evaluation in the ED does not preclude the development or presence of a serious of life threatening condition. As such, patient was instructed to return immediately for any worsening or change in current symptoms.       MDM        Medical Decision Making:   Clinical Tests: "   Radiological Study: Ordered and Reviewed           ED Medication(s):  Medications   hydromorphone (PF) injection 0.5 mg (0.5 mg Intravenous Given 6/2/20 2043)       Discharge Medication List as of 6/2/2020  8:40 PM          Follow-up Information     Leif Townsend MD. Schedule an appointment as soon as possible for a visit in 1 day.    Specialty:  Family Medicine  Why:  Return to the Emergency Room, If symptoms worsen  Contact information:  [address] [address]  [phone]                       Scribe Attestation:   Scribe #1: I performed the above scribed service and the documentation accurately describes the services I performed. I attest to the accuracy of the note.     Attending:   Physician Attestation Statement for Scribe #1: I, Columba Lorenzo MD, personally performed the services described in this documentation, as scribed by Santo Monreal, in my presence, and it is both accurate and complete.           Clinical Impression       ICD-10-CM ICD-9-CM   1. Leg swelling M79.89 729.81       Disposition:   Disposition: Discharged  Condition: Stable         Columba Lorenzo MD  06/03/20 1239

## 2020-06-16 ENCOUNTER — TELEPHONE (OUTPATIENT)
Dept: PODIATRY | Facility: CLINIC | Age: 75
End: 2020-06-16

## 2020-06-16 NOTE — TELEPHONE ENCOUNTER
Contacted pt regarding questions about diabetic shoes. Pt stated she had lost shoe script given to her at her last appointment and did not have any means to get the script. I informed her that I will fax the script off to [facility] and they will be in contact with her regarding her shoes. Pt verbalized understanding and phone call ended pleasantly.        Thank You,  Apple Anaya MA  Podiatry Surgical Department  [facility]                ----- Message from Erika Graham MA sent at 6/16/2020 11:56 AM CDT -----  Regarding: FW: [id number]    ----- Message -----  From: Alyson Ascencio  Sent: 6/16/2020  11:45 AM CDT  To: [facility] Staff  Subject: [phone]                                Pt would like to know how would she go about getting diabetic shoes. . Please call back at [phone] (home).    Thank You,   Alyson Ascencio

## 2020-07-02 ENCOUNTER — TELEPHONE (OUTPATIENT)
Dept: OPHTHALMOLOGY | Facility: CLINIC | Age: 75
End: 2020-07-02

## 2020-07-02 NOTE — TELEPHONE ENCOUNTER
----- Message from Sarah Son sent at 7/2/2020  8:57 AM CDT -----  Regarding: needing appt  Dilia Talley calling regarding Appointment Access  (message) for needing an appt to be scheduled. please contact pt @[phone]

## 2020-07-20 ENCOUNTER — HOSPITAL ENCOUNTER (EMERGENCY)
Facility: HOSPITAL | Age: 75
Discharge: HOME | End: 2020-07-20
Attending: EMERGENCY MEDICINE
Payer: MEDICARE

## 2020-07-20 VITALS
OXYGEN SATURATION: 96 % | RESPIRATION RATE: 19 BRPM | BODY MASS INDEX: 36.62 KG/M2 | DIASTOLIC BLOOD PRESSURE: 74 MMHG | SYSTOLIC BLOOD PRESSURE: 124 MMHG | TEMPERATURE: 100 F | WEIGHT: 219.81 LBS | HEIGHT: 65 IN | HEART RATE: 70 BPM

## 2020-07-20 DIAGNOSIS — U07.1 COVID-19 VIRUS INFECTION: Primary | ICD-10-CM

## 2020-07-20 DIAGNOSIS — R53.1 WEAKNESS: ICD-10-CM

## 2020-07-20 LAB
ALBUMIN SERPL BCP-MCNC: 3.3 G/DL (ref 3.5–5.2)
ALP SERPL-CCNC: 102 U/L (ref 55–135)
ALT SERPL W/O P-5'-P-CCNC: 8 U/L (ref 10–44)
ANION GAP SERPL CALC-SCNC: 8 MMOL/L (ref 8–16)
AST SERPL-CCNC: 21 U/L (ref 10–40)
BASOPHILS # BLD AUTO: 0.02 K/UL (ref 0–0.2)
BASOPHILS NFR BLD: 0.3 % (ref 0–1.9)
BILIRUB SERPL-MCNC: 0.3 MG/DL (ref 0.1–1)
BILIRUB UR QL STRIP: NEGATIVE
BNP SERPL-MCNC: 84 PG/ML (ref 0–99)
BUN SERPL-MCNC: 11 MG/DL (ref 8–23)
CALCIUM SERPL-MCNC: 10.2 MG/DL (ref 8.7–10.5)
CHLORIDE SERPL-SCNC: 105 MMOL/L (ref 95–110)
CK SERPL-CCNC: 599 U/L (ref 20–180)
CLARITY UR: CLEAR
CO2 SERPL-SCNC: 24 MMOL/L (ref 23–29)
COLOR UR: YELLOW
CREAT SERPL-MCNC: 1.3 MG/DL (ref 0.5–1.4)
DIFFERENTIAL METHOD: ABNORMAL
EOSINOPHIL # BLD AUTO: 0.1 K/UL (ref 0–0.5)
EOSINOPHIL NFR BLD: 1 % (ref 0–8)
ERYTHROCYTE [DISTWIDTH] IN BLOOD BY AUTOMATED COUNT: 13.7 % (ref 11.5–14.5)
EST. GFR  (AFRICAN AMERICAN): 46 ML/MIN/1.73 M^2
EST. GFR  (NON AFRICAN AMERICAN): 40 ML/MIN/1.73 M^2
GLUCOSE SERPL-MCNC: 102 MG/DL (ref 70–110)
GLUCOSE UR QL STRIP: NEGATIVE
HCT VFR BLD AUTO: 34.4 % (ref 37–48.5)
HGB BLD-MCNC: 10.9 G/DL (ref 12–16)
HGB UR QL STRIP: ABNORMAL
IMM GRANULOCYTES # BLD AUTO: 0.04 K/UL (ref 0–0.04)
IMM GRANULOCYTES NFR BLD AUTO: 0.7 % (ref 0–0.5)
KETONES UR QL STRIP: NEGATIVE
LEUKOCYTE ESTERASE UR QL STRIP: NEGATIVE
LYMPHOCYTES # BLD AUTO: 1 K/UL (ref 1–4.8)
LYMPHOCYTES NFR BLD: 16.4 % (ref 18–48)
MCH RBC QN AUTO: 28.8 PG (ref 27–31)
MCHC RBC AUTO-ENTMCNC: 31.7 G/DL (ref 32–36)
MCV RBC AUTO: 91 FL (ref 82–98)
MICROSCOPIC COMMENT: ABNORMAL
MONOCYTES # BLD AUTO: 0.9 K/UL (ref 0.3–1)
MONOCYTES NFR BLD: 15.2 % (ref 4–15)
NEUTROPHILS # BLD AUTO: 3.8 K/UL (ref 1.8–7.7)
NEUTROPHILS NFR BLD: 66.4 % (ref 38–73)
NITRITE UR QL STRIP: NEGATIVE
NRBC BLD-RTO: 0 /100 WBC
PH UR STRIP: 8 [PH] (ref 5–8)
PLATELET # BLD AUTO: 296 K/UL (ref 150–350)
PMV BLD AUTO: 9.6 FL (ref 9.2–12.9)
POTASSIUM SERPL-SCNC: 4.2 MMOL/L (ref 3.5–5.1)
PROT SERPL-MCNC: 8 G/DL (ref 6–8.4)
PROT UR QL STRIP: NEGATIVE
RBC # BLD AUTO: 3.78 M/UL (ref 4–5.4)
RBC #/AREA URNS HPF: 5 /HPF (ref 0–4)
SARS-COV-2 RDRP RESP QL NAA+PROBE: POSITIVE
SODIUM SERPL-SCNC: 137 MMOL/L (ref 136–145)
SP GR UR STRIP: 1.01 (ref 1–1.03)
TROPONIN I SERPL DL<=0.01 NG/ML-MCNC: 0.01 NG/ML (ref 0–0.03)
URN SPEC COLLECT METH UR: ABNORMAL
UROBILINOGEN UR STRIP-ACNC: NEGATIVE EU/DL
WBC # BLD AUTO: 5.79 K/UL (ref 3.9–12.7)

## 2020-07-20 PROCEDURE — 84484 ASSAY OF TROPONIN QUANT: CPT | Mod: HCWC

## 2020-07-20 PROCEDURE — 93005 ELECTROCARDIOGRAM TRACING: CPT | Mod: HCWC

## 2020-07-20 PROCEDURE — 83880 ASSAY OF NATRIURETIC PEPTIDE: CPT | Mod: HCWC

## 2020-07-20 PROCEDURE — 82550 ASSAY OF CK (CPK): CPT | Mod: HCWC

## 2020-07-20 PROCEDURE — 93010 ELECTROCARDIOGRAM REPORT: CPT | Mod: HCWC,,, | Performed by: INTERNAL MEDICINE

## 2020-07-20 PROCEDURE — 99285 EMERGENCY DEPT VISIT HI MDM: CPT | Mod: 25,HCWC

## 2020-07-20 PROCEDURE — U0002 COVID-19 LAB TEST NON-CDC: HCPCS | Mod: HCWC

## 2020-07-20 PROCEDURE — 85025 COMPLETE CBC W/AUTO DIFF WBC: CPT | Mod: HCWC

## 2020-07-20 PROCEDURE — 80053 COMPREHEN METABOLIC PANEL: CPT | Mod: HCWC

## 2020-07-20 PROCEDURE — 81000 URINALYSIS NONAUTO W/SCOPE: CPT | Mod: HCWC

## 2020-07-20 PROCEDURE — 93010 EKG 12-LEAD: ICD-10-PCS | Mod: HCWC,,, | Performed by: INTERNAL MEDICINE

## 2020-07-20 NOTE — ED NOTES
Procedure clarified for pulse ox and pt will be contacted by pharmacy when pulse ox is ready for her to  from [facility] pharmacy.

## 2020-07-20 NOTE — ED NOTES
Was told pt's ride had arrived. Waiting for pulse ox to be delivered for pt to take home for home monitoring program. Spoke to Jazz from case management and was told she would bring a pulse ox down.

## 2020-07-20 NOTE — ED PROVIDER NOTES
SCRIBE #1 NOTE: I, Santo Monreal, am scribing for, and in the presence of, Elver Jerry MD. I have scribed the entire note.       History     Chief Complaint   Patient presents with    Leg Pain     bilateral leg pain x 2 years, worse yesterday and today, pt was unable to get up out the bed and urinated on herself.     Review of patient's allergies indicates:  No Known Allergies      History of Present Illness     HPI    7/20/2020, 7:12 AM  History obtained from the patient, [facility]      History of Present Illness: Dilia Talley is a 73 y.o. female patient with a history of DM, HTN, neuropathy, obesity, who presents to the Emergency Department for evaluation of chronic bilateral leg pain which onset several months PTA. Patient states symptoms worsened yesterday. Symptoms are constant and moderate in severity. No mitigating or exacerbating factors reported. Associated sxs include bilateral leg weakness. Patient denies any fever, chills, n/v, chest pain, SOB, and all other sxs at this time. Prior Tx includes none. No further complaints or concerns at this time. Per [facility], patient was unable to lift herself out of bed this AM 2/2 bilateral arthritic leg pain.       Arrival mode: [facility]    PCP: Leif Townsend MD      Past Medical History:  Past Medical History:   Diagnosis Date    Diabetes     HTN (hypertension)     Neuropathy     Obese        Past Surgical History:  Past Surgical History:   Procedure Laterality Date    UMBILICAL HERNIA REPAIR           Family History:  Family History   Problem Relation Age of Onset    Diabetes Sister     Hypertension Sister        Social History:   Social History     Tobacco Use    Smoking status: Never Smoker    Smokeless tobacco: Never Used   Substance and Sexual Activity    Alcohol use: No    Drug use: No    Sexual activity: Not Currently     Partners: Male     Birth control/protection: None        Review of Systems     Review of Systems   Constitutional: Negative for  chills and fever.   HENT: Negative for sore throat.    Respiratory: Negative for shortness of breath.    Cardiovascular: Positive for leg swelling. Negative for chest pain.   Gastrointestinal: Negative for nausea and vomiting.   Genitourinary: Negative for dysuria.   Musculoskeletal: Positive for arthralgias. Negative for back pain.        + bilateral leg weakness   Skin: Negative for rash.   Neurological: Negative for weakness.   Hematological: Does not bruise/bleed easily.   All other systems reviewed and are negative.       Physical Exam     Initial Vitals [07/20/20 0709]   BP Pulse Resp Temp SpO2   (!) 126/58 78 18 100.1 °F (37.8 °C) 96 %      MAP       --          Physical Exam  Nursing Notes and Vital Signs Reviewed.  Constitutional: Well-developed and well-nourished. Patient is in NAD.  Head: Atraumatic. Normocephalic.  Eyes: PERRL. EOM intact. Conjunctivae are not pale. No scleral icterus.  ENT: Mucous membranes are moist. Oropharynx is clear and symmetric.    Neck: Supple. Full ROM. No lymphadenopathy.  Cardiovascular: Regular rate. Regular rhythm. No murmurs, rubs, or gallops. Distal pulses are 2+ and symmetric. 3+ BLE edema.  Pulmonary/Chest: No respiratory distress. Clear to auscultation bilaterally. No wheezing or rales.  Abdominal: Soft and non-distended.  There is no tenderness.  No rebound, guarding, or rigidity. Good bowel sounds.  Genitourinary: No CVA tenderness  Musculoskeletal: Moves all extremities. No obvious deformities. No calf tenderness.  Skin: Warm and dry.  Neurological:  Alert, awake, and appropriate.  Normal speech.  No acute focal neurological deficits are appreciated.  Psychiatric: Normal affect. Good eye contact. Appropriate in content.     ED Course   Procedures  ED Vital Signs:  Vitals:    07/20/20 0709 07/20/20 0723 07/20/20 0724 07/20/20 1022   BP: (!) 126/58 (!) 126/58  128/60   Pulse: 78 81 81 71   Resp: 18   (!) 23   Temp: 100.1 °F (37.8 °C)      TempSrc:       SpO2: 96%  95%  "99%   Weight: 99.7 kg (219 lb 12.8 oz)      Height: 5' 5" (1.651 m)       07/20/20 1054 07/20/20 1056   BP:  124/61   Pulse:  70   Resp:  (!) 23   Temp: (!) 100.4 °F (38 °C)    TempSrc: Oral    SpO2:  99%   Weight:     Height:         Abnormal Lab Results:  Labs Reviewed   COMPREHENSIVE METABOLIC PANEL - Abnormal; Notable for the following components:       Result Value    Albumin 3.3 (*)     ALT 8 (*)     eGFR if  46 (*)     eGFR if non  40 (*)     All other components within normal limits   URINALYSIS, REFLEX TO URINE CULTURE - Abnormal; Notable for the following components:    Occult Blood UA 1+ (*)     All other components within normal limits    Narrative:     Specimen Source->Urine   CK - Abnormal; Notable for the following components:     (*)     All other components within normal limits   SARS-COV-2 RNA AMPLIFICATION, QUAL - Abnormal; Notable for the following components:    SARS-CoV-2 RNA, Amplification, Qual Positive (*)     All other components within normal limits   URINALYSIS MICROSCOPIC - Abnormal; Notable for the following components:    RBC, UA 5 (*)     All other components within normal limits    Narrative:     Specimen Source->Urine   CBC W/ AUTO DIFFERENTIAL - Abnormal; Notable for the following components:    RBC 3.78 (*)     Hemoglobin 10.9 (*)     Hematocrit 34.4 (*)     Mean Corpuscular Hemoglobin Conc 31.7 (*)     Immature Granulocytes 0.7 (*)     Lymph% 16.4 (*)     Mono% 15.2 (*)     All other components within normal limits   TROPONIN I   B-TYPE NATRIURETIC PEPTIDE        All Lab Results:  Results for orders placed or performed during the hospital encounter of 07/20/20   Comprehensive metabolic panel   Result Value Ref Range    Sodium 137 136 - 145 mmol/L    Potassium 4.2 3.5 - 5.1 mmol/L    Chloride 105 95 - 110 mmol/L    CO2 24 23 - 29 mmol/L    Glucose 102 70 - 110 mg/dL    BUN, Bld 11 8 - 23 mg/dL    Creatinine 1.3 0.5 - 1.4 mg/dL    Calcium 10.2 " 8.7 - 10.5 mg/dL    Total Protein 8.0 6.0 - 8.4 g/dL    Albumin 3.3 (L) 3.5 - 5.2 g/dL    Total Bilirubin 0.3 0.1 - 1.0 mg/dL    Alkaline Phosphatase 102 55 - 135 U/L    AST 21 10 - 40 U/L    ALT 8 (L) 10 - 44 U/L    Anion Gap 8 8 - 16 mmol/L    eGFR if African American 46 (A) >60 mL/min/1.73 m^2    eGFR if non African American 40 (A) >60 mL/min/1.73 m^2   Urinalysis, Reflex to Urine Culture Urine, Clean Catch    Specimen: Urine   Result Value Ref Range    Specimen UA Urine, Catheterized     Color, UA Yellow Yellow, Straw, Shabnam    Appearance, UA Clear Clear    pH, UA 8.0 5.0 - 8.0    Specific Gravity, UA 1.015 1.005 - 1.030    Protein, UA Negative Negative    Glucose, UA Negative Negative    Ketones, UA Negative Negative    Bilirubin (UA) Negative Negative    Occult Blood UA 1+ (A) Negative    Nitrite, UA Negative Negative    Urobilinogen, UA Negative <2.0 EU/dL    Leukocytes, UA Negative Negative   CK   Result Value Ref Range     (H) 20 - 180 U/L   Troponin I   Result Value Ref Range    Troponin I 0.013 0.000 - 0.026 ng/mL   COVID-19 Rapid Screening   Result Value Ref Range    SARS-CoV-2 RNA, Amplification, Qual Positive (A) Negative   Urinalysis Microscopic   Result Value Ref Range    RBC, UA 5 (H) 0 - 4 /hpf    Microscopic Comment SEE COMMENT    CBC auto differential   Result Value Ref Range    WBC 5.79 3.90 - 12.70 K/uL    RBC 3.78 (L) 4.00 - 5.40 M/uL    Hemoglobin 10.9 (L) 12.0 - 16.0 g/dL    Hematocrit 34.4 (L) 37.0 - 48.5 %    Mean Corpuscular Volume 91 82 - 98 fL    Mean Corpuscular Hemoglobin 28.8 27.0 - 31.0 pg    Mean Corpuscular Hemoglobin Conc 31.7 (L) 32.0 - 36.0 g/dL    RDW 13.7 11.5 - 14.5 %    Platelets 296 150 - 350 K/uL    MPV 9.6 9.2 - 12.9 fL    Immature Granulocytes 0.7 (H) 0.0 - 0.5 %    Gran # (ANC) 3.8 1.8 - 7.7 K/uL    Immature Grans (Abs) 0.04 0.00 - 0.04 K/uL    Lymph # 1.0 1.0 - 4.8 K/uL    Mono # 0.9 0.3 - 1.0 K/uL    Eos # 0.1 0.0 - 0.5 K/uL    Baso # 0.02 0.00 - 0.20 K/uL     nRBC 0 0 /100 WBC    Gran% 66.4 38.0 - 73.0 %    Lymph% 16.4 (L) 18.0 - 48.0 %    Mono% 15.2 (H) 4.0 - 15.0 %    Eosinophil% 1.0 0.0 - 8.0 %    Basophil% 0.3 0.0 - 1.9 %    Differential Method Automated    Brain natriuretic peptide   Result Value Ref Range    BNP 84 0 - 99 pg/mL         Imaging Results          X-Ray Chest AP Portable (Final result)  Result time 07/20/20 08:49:17    Final result by Jeff Charles MD (07/20/20 08:49:17)                 Impression:      1. No acute chest findings.  2. No significant change since 12/06/2019.      Electronically signed by: Jeff Charles  Date:    07/20/2020  Time:    08:49             Narrative:    EXAMINATION:  XR CHEST AP PORTABLE    CLINICAL HISTORY:  weakness;  bilateral lower extremity pain.    COMPARISON:  12/16/2019    FINDINGS:  Mild diffuse increased interstitial markings likely chronic and unchanged since the prior exam.  Otherwise lungs clear.  Heart size within normal limits.  Mild vascular calcifications of the aorta.No significant bony findings.  No significant change since the previous exam dated 12/06/2019.                                 The EKG was ordered, reviewed, and independently interpreted by the ED provider.  Interpretation time: 0740  Rate: 78 BPM  Rhythm: NSR with sinus arrhythmia   Interpretation: nonspecific ST and T wave abnormality. No STEMI.           The Emergency Provider reviewed the vital signs and test results, which are outlined above.     ED Discussion       10:38 AM: Reassessed pt at this time.  Pt states her condition has improved at this time. Discussed with pt all pertinent ED information and results. Discussed pt dx and plan of tx. Gave pt all f/u and return to the ED instructions. All questions and concerns were addressed at this time. Pt expresses understanding of information and instructions, and is comfortable with plan to discharge. Pt is stable for discharge.    I discussed with patient and/or  family/caretaker that evaluation in the ED does not suggest any emergent or life threatening medical conditions requiring immediate intervention beyond what was provided in the ED, and I believe patient is safe for discharge.  Regardless, an unremarkable evaluation in the ED does not preclude the development or presence of a serious of life threatening condition. As such, patient was instructed to return immediately for any worsening or change in current symptoms.    Patient is Covid-19 positive. Instructed patient to isolate at home away from friends and family members. Instructed patient to wear a facemask and cover coughs/sneezes. Instructed patient to wash hands often with soap and water or hand . Instructed patient to avoid sharing household items and to wipe down surfaces daily. Instructed patient to monitor symptoms and to seek prompt medical attention for worsening illness, especially difficulty breathing. Instructed patient to remain home until: 1) no fever or other symptoms for at least 72 hours without use of medication and 2) at least 14 days have passed since symptoms first onset.     MDM        Medical Decision Making:   Clinical Tests:   Lab Tests: Ordered and Reviewed  Radiological Study: Ordered and Reviewed  Medical Tests: Ordered and Reviewed           ED Medication(s):  Medications - No data to display    New Prescriptions    No medications on file       Follow-up Information     Leif Townsend MD.    Specialty: Family Medicine  Contact information:  [address] [address] [phone]                       Scribe Attestation:   Scribe #1: I performed the above scribed service and the documentation accurately describes the services I performed. I attest to the accuracy of the note.     Attending:   Physician Attestation Statement for Scribe #1: I, Elver Jerry MD, personally performed the services described in this documentation, as scribed by Santo  Rah, in my presence, and it is both accurate and complete.           Clinical Impression       ICD-10-CM ICD-9-CM   1. COVID-19 virus infection  U07.1    2. Weakness  R53.1 780.79         Disposition:   Disposition: Discharged  Condition: Stable         Elver Jerry MD  07/20/20 6794

## 2020-07-20 NOTE — ED NOTES
Pt reported that she lives with her two adult sons. One of her sons is in quarantine because he has symptoms of covid 19 but is now staying with another family member.

## 2020-07-21 ENCOUNTER — HOSPITAL ENCOUNTER (EMERGENCY)
Facility: HOSPITAL | Age: 75
Discharge: HOME | End: 2020-07-21
Attending: EMERGENCY MEDICINE
Payer: MEDICARE

## 2020-07-21 ENCOUNTER — NURSE TRIAGE (OUTPATIENT)
Dept: ADMINISTRATIVE | Facility: CLINIC | Age: 75
End: 2020-07-21

## 2020-07-21 ENCOUNTER — TELEPHONE (OUTPATIENT)
Dept: ADMINISTRATIVE | Facility: CLINIC | Age: 75
End: 2020-07-21

## 2020-07-21 VITALS
DIASTOLIC BLOOD PRESSURE: 53 MMHG | SYSTOLIC BLOOD PRESSURE: 97 MMHG | RESPIRATION RATE: 20 BRPM | HEART RATE: 75 BPM | TEMPERATURE: 98 F | BODY MASS INDEX: 35.65 KG/M2 | HEIGHT: 65 IN | OXYGEN SATURATION: 99 % | WEIGHT: 214 LBS

## 2020-07-21 DIAGNOSIS — Z86.16 HISTORY OF 2019 NOVEL CORONAVIRUS DISEASE (COVID-19): ICD-10-CM

## 2020-07-21 DIAGNOSIS — M79.10 MYALGIA: Primary | ICD-10-CM

## 2020-07-21 PROCEDURE — 99283 EMERGENCY DEPT VISIT LOW MDM: CPT | Mod: HCWC

## 2020-07-21 PROCEDURE — 25000003 PHARM REV CODE 250: Mod: HCWC | Performed by: EMERGENCY MEDICINE

## 2020-07-21 RX ORDER — ACETAMINOPHEN 500 MG
1000 TABLET ORAL
Status: COMPLETED | OUTPATIENT
Start: 2020-07-21 | End: 2020-07-21

## 2020-07-21 RX ORDER — OXYCODONE HCL 10 MG/1
10 TABLET, FILM COATED, EXTENDED RELEASE ORAL EVERY 12 HOURS PRN
Qty: 12 TABLET | Refills: 0 | Status: ON HOLD | OUTPATIENT
Start: 2020-07-21 | End: 2020-08-04 | Stop reason: HOSPADM

## 2020-07-21 RX ORDER — OXYCODONE HYDROCHLORIDE 5 MG/1
10 TABLET ORAL
Status: COMPLETED | OUTPATIENT
Start: 2020-07-21 | End: 2020-07-21

## 2020-07-21 RX ADMIN — OXYCODONE 10 MG: 5 TABLET ORAL at 09:07

## 2020-07-21 RX ADMIN — ACETAMINOPHEN 1000 MG: 500 TABLET ORAL at 08:07

## 2020-07-21 NOTE — TELEPHONE ENCOUNTER
"Covid surveillance escalation due to "no response" no contact attempted, patient is currently in the hospital.     Reason for Disposition   Caller has already spoken with the PCP (or office), and has no further questions    Protocols used: NO CONTACT OR DUPLICATE CONTACT CALL-A-[facility]      "

## 2020-07-21 NOTE — ED PROVIDER NOTES
SCRIBE #1 NOTE: I, Santo Monreal, am scribing for, and in the presence of, Columba Lorenzo MD. I have scribed the entire note.       History     Chief Complaint   Patient presents with    COVID-19 Concerns     pt tested postive for covid yesterday. today she started having bilateral leg and arm pain, fever, HA. pt rates the pain a 9/10     Review of patient's allergies indicates:  No Known Allergies      History of Present Illness     HPI    7/21/2020, 8:37 AM  History obtained from the patient      History of Present Illness: Dilia Talley is a 73 y.o. female patient with a history of COVID-19, arthritis who presents to the Emergency Department for evaluation of bilateral leg pain which onset several days PTA. Patient reports chronic b/l leg pain but states current symptoms are worse than usual. Symptoms are constant and moderate in severity. No mitigating or exacerbating factors reported. Associated sxs include none. Patient denies any fever, chills, n/v, chest pain, SOB, calf pain or leg swelling and all other sxs at this time. Prior Tx includes home pain medication (patient does not remember the name). No further complaints or concerns at this time. Patient diagnosed with COVID-19 at this facility yesterday.      Arrival mode: Personal transportation     PCP: Leif Townsend MD      Past Medical History:  Past Medical History:   Diagnosis Date    Arthritis     Diabetes     Gout     HTN (hypertension)     Neuropathy     Obese        Past Surgical History:  Past Surgical History:   Procedure Laterality Date    UMBILICAL HERNIA REPAIR           Family History:  Family History   Problem Relation Age of Onset    Diabetes Sister     Hypertension Sister        Social History:   Social History     Tobacco Use    Smoking status: Never Smoker    Smokeless tobacco: Never Used   Substance and Sexual Activity    Alcohol use: No    Drug use: No    Sexual activity: Not Currently     Partners: Male     Birth  control/protection: None        Review of Systems     Review of Systems   Constitutional: Negative for chills and fever.   HENT: Negative for sore throat.    Respiratory: Negative for shortness of breath.    Cardiovascular: Negative for chest pain.   Gastrointestinal: Negative for nausea and vomiting.   Genitourinary: Negative for dysuria.   Musculoskeletal: Positive for myalgias. Negative for back pain.   Skin: Negative for rash.   Neurological: Negative for weakness.   Hematological: Does not bruise/bleed easily.   All other systems reviewed and are negative.       Physical Exam     Initial Vitals [07/21/20 0802]   BP Pulse Resp Temp SpO2   122/65 74 20 (!) 100.8 °F (38.2 °C) 97 %      MAP       --          Physical Exam  Nursing Notes and Vital Signs Reviewed.  Constitutional: Chronically ill appearing and elderly. Patient is in NAD.  Head: Atraumatic. Normocephalic.  Eyes: PERRL. EOM intact. Conjunctivae are not pale. No scleral icterus.  ENT: Mucous membranes are moist. Oropharynx is clear and symmetric.    Neck: Supple. Full ROM. No lymphadenopathy.  Cardiovascular: Regular rate. Regular rhythm. No murmurs, rubs, or gallops. Distal pulses are 2+ and symmetric.  Pulmonary/Chest: No respiratory distress. Clear to auscultation bilaterally. No wheezing or rales.  Abdominal: Soft and non-distended.  There is no tenderness.  No rebound, guarding, or rigidity. Good bowel sounds.  Genitourinary: No CVA tenderness  Musculoskeletal: Moves all extremities. No obvious deformities. No calf tenderness.  Bilateral legs: No evident deformity. ROM is normal. Cap refill distally is <2 seconds. DP and PT pulses are equal and 2+ bilaterally. No motor deficit. No distal sensory deficit  Skin: Warm and dry.  Neurological:  Alert, awake, and appropriate.  Normal speech.  No acute focal neurological deficits are appreciated.  Psychiatric: Normal affect. Good eye contact. Appropriate in content.     ED Course   Procedures  ED Vital  "Signs:  Vitals:    07/21/20 0802 07/21/20 0831 07/21/20 0847 07/21/20 0908   BP: 122/65 131/63 (!) 104/52    Pulse: 74 74 71    Resp: 20   20   Temp: (!) 100.8 °F (38.2 °C)      TempSrc: Oral      SpO2: 97% 100% 99%    Weight: 97.1 kg (214 lb)      Height: 5' 5" (1.651 m)       07/21/20 0913 07/21/20 0916 07/21/20 0919 07/21/20 0922   BP: (!) 103/50 (!) 102/53     Pulse: 73 68     Resp: (!) 27  20    Temp:    97.5 °F (36.4 °C)   TempSrc:    Oral   SpO2: 99% 96%     Weight:       Height:        07/21/20 0931 07/21/20 1002   BP: (!) 98/56 (!) 108/59   Pulse: 70 66   Resp:  19   Temp:     TempSrc:     SpO2: 100% 99%   Weight:     Height:              The Emergency Provider reviewed the vital signs and test results, which are outlined above.     ED Discussion     Instructed patient to hold Lipitor due to possible exacerbation of muscle aches due to COVID-19 and mild elevation in CPK on yesterdays blood work, do not feel repeat labs are warranted.    8:40 AM: Reassessed pt at this time.  Pt states her condition has improved at this time. Discussed with pt all pertinent ED information and results. Discussed pt dx and plan of tx. Gave pt all f/u and return to the ED instructions. All questions and concerns were addressed at this time. Pt expresses understanding of information and instructions, and is comfortable with plan to discharge. Pt is stable for discharge.    I discussed with patient and/or family/caretaker that evaluation in the ED does not suggest any emergent or life threatening medical conditions requiring immediate intervention beyond what was provided in the ED, and I believe patient is safe for discharge.  Regardless, an unremarkable evaluation in the ED does not preclude the development or presence of a serious of life threatening condition. As such, patient was instructed to return immediately for any worsening or change in current symptoms.    Driving or other activities under influence of medications - " Patient and/or family/caretaker was given a prescription for, or instructed to use a medicine that may impair ability to drive, operate machinery, or participate in other potentially dangerous activities.  Patient was instructed not to participate in these activities while under the influence of these medications.       MDM   Contains abnormal data Comprehensive metabolic panel  Order: [id number]  Status:  Final result   Visible to patient:  No (not released) Next appt:  08/31/2020 at 10:20 AM in Podiatry (Doretha Walter DPM)   Ref Range & Units 1d ago 2mo ago 7mo ago   Sodium 136 - 145 mmol/L 137  136  138    Potassium 3.5 - 5.1 mmol/L 4.2  3.9  3.6    Chloride 95 - 110 mmol/L 105  104  103    CO2 23 - 29 mmol/L 24  23  25    Glucose 70 - 110 mg/dL 102  101  94    BUN, Bld 8 - 23 mg/dL 11  12  19    Creatinine 0.5 - 1.4 mg/dL 1.3  1.1  1.3    Calcium 8.7 - 10.5 mg/dL 10.2  10.6High   9.7    Total Protein 6.0 - 8.4 g/dL 8.0  7.7  6.9    Albumin 3.5 - 5.2 g/dL 3.3Low   3.1Low   2.8Low     Total Bilirubin 0.1 - 1.0 mg/dL 0.3  0.4 CM  0.6 CM    Comment: For infants and newborns, interpretation of results should be based   on gestational age, weight and in agreement with clinical   observations.   Premature Infant recommended reference ranges:   Up to 24 hours.............<8.0 mg/dL   Up to 48 hours............<12.0 mg/dL   3-5 days..................<15.0 mg/dL   6-29 days.................<15.0 mg/dL    Alkaline Phosphatase 55 - 135 U/L 102  110  89    AST 10 - 40 U/L 21  9Low   11    ALT 10 - 44 U/L 8Low   6Low   5Low     Anion Gap 8 - 16 mmol/L 8  9  10    eGFR if African American >60 mL/min/1.73 m^2 46Abnormal   57Abnormal   46Abnormal     eGFR if non African American >60 mL/min/1.73 m^2 40Abnormal   49Abnormal  CM  40Abnormal  CM    Comment: Calculation used to obtain the estimated glomerular filtration   rate (eGFR) is the CKD-EPI equation.    [facility]  [facility]         Specimen Collected: 07/20/20  07:49 Last Resulted: 07/20/20 08:51                          ED Medication(s):  Medications   acetaminophen tablet 1,000 mg (1,000 mg Oral Given 7/21/20 0817)   oxyCODONE immediate release tablet 10 mg (10 mg Oral Given 7/21/20 0919)       New Prescriptions    OXYCODONE (OXYCONTIN) 10 MG 12 HR TABLET    Take 1 tablet (10 mg total) by mouth every 12 (twelve) hours as needed for Pain.       Follow-up Information     Leif Townsend MD. Schedule an appointment as soon as possible for a visit in 2 days.    Specialty: Family Medicine  Why: Return to [facility] Emergency Room, If symptoms worsen  Contact information:  [address] [address]  [phone]                       Scribe Attestation:   Scribe #1: I performed the above scribed service and the documentation accurately describes the services I performed. I attest to the accuracy of the note.     Attending:   Physician Attestation Statement for Scribe #1: I, Columba Lorenzo MD, personally performed the services described in this documentation, as scribed by Santo Monreal, in my presence, and it is both accurate and complete.           Clinical Impression       ICD-10-CM ICD-9-CM   1. Myalgia  M79.10 729.1   2. History of 2019 novel coronavirus disease (COVID-19)  Z86.19              ED Disposition Condition    Discharge Stable             Columba Lorenzo MD  07/21/20 1030

## 2020-07-21 NOTE — TELEPHONE ENCOUNTER
"Covid surveillance escalation due to "no response" with afternoon push. No contact attempted at this time. Patient discharged from hospital only 1 hour ago has not picked up supplies.     Reason for Disposition   Caller has already spoken with the PCP and has no further questions.    Additional Information   Negative: Caller is angry or rude (e.g., hangs up, verbally abusive, yelling)   Negative: Caller hangs up    Protocols used: NO CONTACT OR DUPLICATE CONTACT CALL-A-AH      "

## 2020-07-21 NOTE — ED NOTES
Rec'd report from TANIA Javed. Assumed care of pt at this time. Pt lying in bed comfortably. AAO x 4. Resp even and unlabored with equal chest rise and fall. Skin warm and dry. Side rails up x 2. Call light within reach. No distress noted. Pt denies any needs or assist at this time.

## 2020-07-21 NOTE — ED NOTES
Spoke to tawny, patient's son. robert pichardo, cousin to patient will be here in 45 minutes to 1 hr.

## 2020-07-21 NOTE — ED NOTES
Pt was c/o having a hard time breathing. O2 sats at 100 RA. Lung sounds clear. Pt blood pressure lowered to 103/50. Held pain medicine to consult dr. Dr. Lorenzo agrees to give medicine and place patient on 1.5L O2 via nasal canula. Will monitor patient.

## 2020-07-22 ENCOUNTER — NURSE TRIAGE (OUTPATIENT)
Dept: ADMINISTRATIVE | Facility: CLINIC | Age: 75
End: 2020-07-22

## 2020-07-22 NOTE — TELEPHONE ENCOUNTER
Pt verified identity by spelling first and last name and verifying . Pt states that she realized that her birthday is 1945 after receiving her birth certificate. Informed pt to please bring birth certificate to next PCP appointment to have information changed; pt voiced verbal understanding. Pt states that she doesn't have or know how to operate any technology such as a smart phone. Pt states that she lives alone and has no one that can share their technology with her to complete the rudimentary tasks required within the Surveillance program. Pt states that she does have a land line phone and can make long distance calls. Pt provided with [facility] On Call number(s) (local and 800) and instructed to call if she ever feels that her symptoms are worsening and needs to speak with a RN; advised pt to always call 911 for emergencies; pt voiced verbal understanding and agrees with advice and recited [facility] contact numbers to insure accuracy. Pt triaged at this time with no active distress noted or need for medical or emergent intervention. Pt c/o mild, intermittent, dry cough. Denies SOB @ rest or w/activity; chest pressure or tightness, and is negative for stridor or wheezing; no acute distress detected. Pt was unaware of how to use pulse oximeter; pt educated on process and states that she now feels comfortable using device; SaO2 98% on RA; Pulse 72 bpm. Care advice provided at this time and pt instructed that symptoms are mild and can continue to managed and monitored at home; pt voiced verbal understanding and agrees with advice.  Instructed pt to consider self as infectious and to follow social distancing, vigorous handwashing, cover cough and sneezes, wipe down cell phone several times daily with an antibacterial wipe, and quarantine techniques; pt voiced verbal understanding and agrees with information. Instructed pt to call [facility] back for further assistance if needed or if symptoms worsened and call 911 for  all emergencies; pt voiced verbal understanding and agrees with goals. Pt states that she doesn't have any way to text and doesn't know how even if she had a phone that was capable of texting. Pt will not be able to be enrolled in text method of the Surveillance program. Encounter routed to RONALD and PCP for possible discharge from program.

## 2020-07-22 NOTE — TELEPHONE ENCOUNTER
Patient escalated to Covid 19 surveillance as no response for vitals signs.  Patient spoke with nurse already today and documented  No contact required

## 2020-07-22 NOTE — TELEPHONE ENCOUNTER
Reason for Disposition   [1] COVID-19 diagnosed by positive lab test AND [2] mild symptoms (e.g., cough, fever, others) AND [3] no complications or SOB    Additional Information   Negative: SEVERE difficulty breathing (e.g., struggling for each breath, speaks in single words)   Negative: Difficult to awaken or acting confused (e.g., disoriented, slurred speech)   Negative: Bluish (or gray) lips or face now   Negative: Shock suspected (e.g., cold/pale/clammy skin, too weak to stand, low BP, rapid pulse)   Negative: Sounds like a life-threatening emergency to the triager   Negative: [1] COVID-19 exposure AND [2] NO symptoms   Negative: COVID-19 and Breastfeeding, questions about   Negative: [1] Adult with possible COVID-19 symptoms AND [2] triager concerned about severity of symptoms or other causes   Negative: SEVERE or constant chest pain or pressure (Exception: mild central chest pain, present only when coughing)   Negative: MODERATE difficulty breathing (e.g., speaks in phrases, SOB even at rest, pulse 100-120)   Negative: MILD difficulty breathing (e.g., minimal/no SOB at rest, SOB with walking, pulse <100)   Negative: Chest pain   Negative: Patient sounds very sick or weak to the triager   Negative: Fever > 103 F (39.4 C)   Negative: [1] Fever > 101 F (38.3 C) AND [2] age > 60   Negative: [1] Fever > 100.0 F (37.8 C) AND [2] bedridden (e.g., nursing home patient, CVA, chronic illness, recovering from surgery)   Negative: HIGH RISK patient (e.g., age > 64 years, diabetes, heart or lung disease, weak immune system)   Negative: [1] COVID-19 infection suspected by caller or triager AND [2] mild symptoms (cough, fever, or others) AND [3] no complications or SOB   Negative: Fever present > 3 days (72 hours)   Negative: [1] Fever returns after gone for over 24 hours AND [2] symptoms worse or not improved   Negative: [1] Continuous (nonstop) coughing interferes with work or school AND [2] no  improvement using cough treatment per protocol   Negative: Cough present > 3 weeks    Protocols used: CORONAVIRUS (COVID-19) DIAGNOSED OR [id number]

## 2020-07-22 NOTE — TELEPHONE ENCOUNTER
Spoke with RONALD and she states that it's ok to remove pt from the Surveillance Monitoring program. Pt successfully removed from program. Encounter routed to PCP and staff.

## 2020-07-23 ENCOUNTER — HOSPITAL ENCOUNTER (EMERGENCY)
Facility: HOSPITAL | Age: 75
Discharge: HOME | End: 2020-07-23
Attending: EMERGENCY MEDICINE
Payer: MEDICARE

## 2020-07-23 VITALS
SYSTOLIC BLOOD PRESSURE: 101 MMHG | WEIGHT: 217.63 LBS | OXYGEN SATURATION: 100 % | HEIGHT: 65 IN | BODY MASS INDEX: 36.26 KG/M2 | TEMPERATURE: 98 F | DIASTOLIC BLOOD PRESSURE: 71 MMHG | HEART RATE: 75 BPM | RESPIRATION RATE: 21 BRPM

## 2020-07-23 DIAGNOSIS — R06.02 SOB (SHORTNESS OF BREATH): ICD-10-CM

## 2020-07-23 DIAGNOSIS — N30.00 ACUTE CYSTITIS WITHOUT HEMATURIA: Primary | ICD-10-CM

## 2020-07-23 LAB
ALBUMIN SERPL BCP-MCNC: 3 G/DL (ref 3.5–5.2)
ALP SERPL-CCNC: 92 U/L (ref 55–135)
ALT SERPL W/O P-5'-P-CCNC: 19 U/L (ref 10–44)
ANION GAP SERPL CALC-SCNC: 10 MMOL/L (ref 8–16)
AST SERPL-CCNC: 53 U/L (ref 10–40)
BACTERIA #/AREA URNS HPF: ABNORMAL /HPF
BASOPHILS # BLD AUTO: 0.01 K/UL (ref 0–0.2)
BASOPHILS NFR BLD: 0.2 % (ref 0–1.9)
BILIRUB SERPL-MCNC: 0.4 MG/DL (ref 0.1–1)
BILIRUB UR QL STRIP: NEGATIVE
BUN SERPL-MCNC: 16 MG/DL (ref 8–23)
CALCIUM SERPL-MCNC: 9.7 MG/DL (ref 8.7–10.5)
CHLORIDE SERPL-SCNC: 105 MMOL/L (ref 95–110)
CK SERPL-CCNC: 923 U/L (ref 20–180)
CLARITY UR: CLEAR
CO2 SERPL-SCNC: 21 MMOL/L (ref 23–29)
COLOR UR: YELLOW
CREAT SERPL-MCNC: 1.5 MG/DL (ref 0.5–1.4)
DIFFERENTIAL METHOD: ABNORMAL
EOSINOPHIL # BLD AUTO: 0 K/UL (ref 0–0.5)
EOSINOPHIL NFR BLD: 0 % (ref 0–8)
ERYTHROCYTE [DISTWIDTH] IN BLOOD BY AUTOMATED COUNT: 14 % (ref 11.5–14.5)
EST. GFR  (AFRICAN AMERICAN): 39 ML/MIN/1.73 M^2
EST. GFR  (NON AFRICAN AMERICAN): 34 ML/MIN/1.73 M^2
GLUCOSE SERPL-MCNC: 94 MG/DL (ref 70–110)
GLUCOSE UR QL STRIP: NEGATIVE
HCT VFR BLD AUTO: 34.3 % (ref 37–48.5)
HGB BLD-MCNC: 11.1 G/DL (ref 12–16)
HGB UR QL STRIP: ABNORMAL
IMM GRANULOCYTES # BLD AUTO: 0.01 K/UL (ref 0–0.04)
IMM GRANULOCYTES NFR BLD AUTO: 0.2 % (ref 0–0.5)
KETONES UR QL STRIP: NEGATIVE
LEUKOCYTE ESTERASE UR QL STRIP: NEGATIVE
LYMPHOCYTES # BLD AUTO: 1.6 K/UL (ref 1–4.8)
LYMPHOCYTES NFR BLD: 34.5 % (ref 18–48)
MCH RBC QN AUTO: 28.9 PG (ref 27–31)
MCHC RBC AUTO-ENTMCNC: 32.4 G/DL (ref 32–36)
MCV RBC AUTO: 89 FL (ref 82–98)
MICROSCOPIC COMMENT: ABNORMAL
MONOCYTES # BLD AUTO: 0.4 K/UL (ref 0.3–1)
MONOCYTES NFR BLD: 9.7 % (ref 4–15)
NEUTROPHILS # BLD AUTO: 2.5 K/UL (ref 1.8–7.7)
NEUTROPHILS NFR BLD: 55.4 % (ref 38–73)
NITRITE UR QL STRIP: POSITIVE
NRBC BLD-RTO: 0 /100 WBC
PH UR STRIP: 7 [PH] (ref 5–8)
PLATELET # BLD AUTO: 238 K/UL (ref 150–350)
PMV BLD AUTO: 9.7 FL (ref 9.2–12.9)
POTASSIUM SERPL-SCNC: 4 MMOL/L (ref 3.5–5.1)
PROT SERPL-MCNC: 8 G/DL (ref 6–8.4)
PROT UR QL STRIP: ABNORMAL
RBC # BLD AUTO: 3.84 M/UL (ref 4–5.4)
RBC #/AREA URNS HPF: 5 /HPF (ref 0–4)
SODIUM SERPL-SCNC: 136 MMOL/L (ref 136–145)
SP GR UR STRIP: 1.02 (ref 1–1.03)
URN SPEC COLLECT METH UR: ABNORMAL
UROBILINOGEN UR STRIP-ACNC: ABNORMAL EU/DL
WBC # BLD AUTO: 4.52 K/UL (ref 3.9–12.7)
WBC #/AREA URNS HPF: 0 /HPF (ref 0–5)

## 2020-07-23 PROCEDURE — 81000 URINALYSIS NONAUTO W/SCOPE: CPT | Mod: HCWC

## 2020-07-23 PROCEDURE — 25000003 PHARM REV CODE 250: Mod: HCWC | Performed by: FAMILY MEDICINE

## 2020-07-23 PROCEDURE — 93005 ELECTROCARDIOGRAM TRACING: CPT | Mod: HCWC

## 2020-07-23 PROCEDURE — 93010 EKG 12-LEAD: ICD-10-PCS | Mod: HCWC,,, | Performed by: INTERNAL MEDICINE

## 2020-07-23 PROCEDURE — 80053 COMPREHEN METABOLIC PANEL: CPT | Mod: HCWC

## 2020-07-23 PROCEDURE — 85025 COMPLETE CBC W/AUTO DIFF WBC: CPT | Mod: HCWC

## 2020-07-23 PROCEDURE — 82550 ASSAY OF CK (CPK): CPT | Mod: HCWC

## 2020-07-23 PROCEDURE — 93010 ELECTROCARDIOGRAM REPORT: CPT | Mod: HCWC,,, | Performed by: INTERNAL MEDICINE

## 2020-07-23 PROCEDURE — 25000003 PHARM REV CODE 250: Mod: HCWC | Performed by: EMERGENCY MEDICINE

## 2020-07-23 PROCEDURE — 99284 EMERGENCY DEPT VISIT MOD MDM: CPT | Mod: 25,HCWC

## 2020-07-23 RX ORDER — TRAMADOL HYDROCHLORIDE 50 MG/1
50 TABLET ORAL
Status: COMPLETED | OUTPATIENT
Start: 2020-07-23 | End: 2020-07-23

## 2020-07-23 RX ORDER — SULFAMETHOXAZOLE AND TRIMETHOPRIM 800; 160 MG/1; MG/1
1 TABLET ORAL
Status: COMPLETED | OUTPATIENT
Start: 2020-07-23 | End: 2020-07-23

## 2020-07-23 RX ORDER — METHOCARBAMOL 500 MG/1
500 TABLET, FILM COATED ORAL
Status: COMPLETED | OUTPATIENT
Start: 2020-07-23 | End: 2020-07-23

## 2020-07-23 RX ORDER — SULFAMETHOXAZOLE AND TRIMETHOPRIM 800; 160 MG/1; MG/1
1 TABLET ORAL 2 TIMES DAILY
Qty: 14 TABLET | Refills: 0 | Status: ON HOLD | OUTPATIENT
Start: 2020-07-23 | End: 2020-08-04 | Stop reason: HOSPADM

## 2020-07-23 RX ADMIN — METHOCARBAMOL TABLETS 500 MG: 500 TABLET, COATED ORAL at 03:07

## 2020-07-23 RX ADMIN — TRAMADOL HYDROCHLORIDE 50 MG: 50 TABLET, FILM COATED ORAL at 03:07

## 2020-07-23 RX ADMIN — SULFAMETHOXAZOLE AND TRIMETHOPRIM 1 TABLET: 800; 160 TABLET ORAL at 05:07

## 2020-07-23 NOTE — ED NOTES
Patient resting in bed with eyes closed and lights slightly dimmed, call light within reach, bed rails up x 2, belongings within reach, and in NAD. Patient provided with blanket and no needs expressed at this time. Will continue to monitor.

## 2020-07-23 NOTE — ED NOTES
Per patient's son, patient tried to put her shoes on without her pants today. Patient lives at home with her son and her son decided to call the ambulance.

## 2020-07-23 NOTE — ED NOTES
Attempted to call patient's daughter, Abe Alexis ([phone]), to notify her of patient's discharge. No answer.

## 2020-07-23 NOTE — ED PROVIDER NOTES
"SCRIBE #1 NOTE: I, Santo Monreal, am scribing for, and in the presence of, Columba Lorenzo MD. I have scribed the H&P.     SCRIBE #2 NOTE: I, Vee Noguera, am scribing for, and in the presence of,  Lesly Morales MD. I have scribed the remaining portions of the note not scribed by Scribe #1.      History     Chief Complaint   Patient presents with    Altered Mental Status     Pt sent via EMS for c/o of "more forgetful than normal" and body aches. COVID +. aaO XS 3 ON ARRIVAL.      Review of patient's allergies indicates:  No Known Allergies      History of Present Illness     HPI    7/23/2020, 3:24 PM  History obtained from the patient      History of Present Illness: Dilia Talley is a 73 y.o. female patient with a history of COVID-19 (7/20) who presents to the Emergency Department for evaluation of body aches which onset several days PTA. Symptoms are constant and moderate in severity. No mitigating or exacerbating factors reported. Associated sxs include none. Patient denies any fever, chills, n/v/d, chest pain, SOB, confusion, and all other sxs at this time. Prior Tx includes none. No further complaints or concerns at this time. Patient did not stop Lipitor as previously instructed. Contrary to triage, patient denies any confusion. Patient diagnosed with COVID-19 at this facility on 7/20, seen for same issue on 7/21.      Arrival mode:   EMS/[facility]    PCP: Leif Townsend MD      Past Medical History:  Past Medical History:   Diagnosis Date    Arthritis     Diabetes     Gout     HTN (hypertension)     Neuropathy     Obese        Past Surgical History:  Past Surgical History:   Procedure Laterality Date    UMBILICAL HERNIA REPAIR           Family History:  Family History   Problem Relation Age of Onset    Diabetes Sister     Hypertension Sister        Social History:   Social History     Tobacco Use    Smoking status: Never Smoker    Smokeless tobacco: Never Used   Substance and Sexual Activity "    Alcohol use: No    Drug use: No    Sexual activity: Not Currently     Partners: Male     Birth control/protection: None        Review of Systems     Review of Systems   Constitutional: Negative for chills and fever.   HENT: Negative for sore throat.    Respiratory: Negative for shortness of breath.    Cardiovascular: Negative for chest pain.   Gastrointestinal: Negative for diarrhea, nausea and vomiting.   Genitourinary: Negative for dysuria.   Musculoskeletal: Positive for myalgias. Negative for back pain.   Skin: Negative for rash.   Neurological: Negative for weakness.   Hematological: Does not bruise/bleed easily.   Psychiatric/Behavioral: Negative for confusion.   All other systems reviewed and are negative.     Physical Exam     Initial Vitals   BP Pulse Resp Temp SpO2   07/23/20 1502 07/23/20 1500 07/23/20 1500 07/23/20 1500 07/23/20 1500   (!) 131/58 74 (!) 26 98.4 °F (36.9 °C) 96 %      MAP       --                 Physical Exam  Nursing Notes and Vital Signs Reviewed.  Constitutional: Chronically ill appearing and elderly. Patient is in NAD.  Head: Atraumatic. Normocephalic.  Eyes: PERRL. EOM intact. Conjunctivae are not pale. No scleral icterus.  ENT: Mucous membranes are moist. Oropharynx is clear and symmetric.    Neck: Supple. Full ROM. No lymphadenopathy.  Cardiovascular: Regular rate. Regular rhythm. No murmurs, rubs, or gallops. Distal pulses are 2+ and symmetric.  Pulmonary/Chest: No respiratory distress. Clear to auscultation bilaterally. No wheezing or rales.  Abdominal: Soft and non-distended.  There is no tenderness.  No rebound, guarding, or rigidity. Good bowel sounds.  Genitourinary: No CVA tenderness  Musculoskeletal: Moves all extremities. No obvious deformities. No calf tenderness.  Bilateral legs: No evident deformity. ROM is normal. Cap refill distally is <2 seconds. DP and PT pulses are equal and 2+ bilaterally. No motor deficit. No distal sensory deficit  Skin: Warm and  dry.  Neurological:  Alert, awake, and appropriate.  Normal speech.  No acute focal neurological deficits are appreciated. Oriented x3.  Psychiatric: Normal affect. Good eye contact. Appropriate in content.     ED Course   Procedures  ED Vital Signs:  Vitals:    07/23/20 1501 07/23/20 1502 07/23/20 1505 07/23/20 1531   BP:  (!) 131/58 (!) 131/58 109/62   Pulse: 90 72  70   Resp: 12      Temp:       TempSrc:       SpO2: 99% 99%  99%   Weight:       Height:        07/23/20 1536 07/23/20 1538 07/23/20 1601 07/23/20 1631   BP:   (!) 119/58 (!) 121/57   Pulse: 69  66 70   Resp: (!) 21 18 (!) 23 (!) 23   Temp:       TempSrc:       SpO2: 100%  (!) 93%    Weight:       Height:        07/23/20 1638 07/23/20 1701 07/23/20 1704 07/23/20 1713   BP:  130/61     Pulse: 71 76 72 70   Resp: (!) 24 (!) 21 17 16   Temp:       TempSrc:       SpO2: 95% 98% 96% 96%   Weight:       Height:        07/23/20 1731 07/23/20 1736 07/23/20 1752   BP: (!) 122/58  101/71   Pulse: 69  75   Resp: (!) 21     Temp:  98.3 °F (36.8 °C)    TempSrc:  Oral    SpO2: 96%  100%   Weight:      Height:          Abnormal Lab Results:  Labs Reviewed   CBC W/ AUTO DIFFERENTIAL - Abnormal; Notable for the following components:       Result Value    RBC 3.84 (*)     Hemoglobin 11.1 (*)     Hematocrit 34.3 (*)     All other components within normal limits   COMPREHENSIVE METABOLIC PANEL - Abnormal; Notable for the following components:    CO2 21 (*)     Creatinine 1.5 (*)     Albumin 3.0 (*)     AST 53 (*)     eGFR if  39 (*)     eGFR if non  34 (*)     All other components within normal limits   CK - Abnormal; Notable for the following components:     (*)     All other components within normal limits   URINALYSIS, REFLEX TO URINE CULTURE - Abnormal; Notable for the following components:    Protein, UA Trace (*)     Occult Blood UA Trace (*)     Nitrite, UA Positive (*)     Urobilinogen, UA 4.0-6.0 (*)     All other components  within normal limits    Narrative:     Specimen Source->Urine   URINALYSIS MICROSCOPIC - Abnormal; Notable for the following components:    RBC, UA 5 (*)     Bacteria Few (*)     All other components within normal limits    Narrative:     Specimen Source->Urine        All Lab Results:  Results for orders placed or performed during the hospital encounter of 07/23/20   CBC auto differential   Result Value Ref Range    WBC 4.52 3.90 - 12.70 K/uL    RBC 3.84 (L) 4.00 - 5.40 M/uL    Hemoglobin 11.1 (L) 12.0 - 16.0 g/dL    Hematocrit 34.3 (L) 37.0 - 48.5 %    Mean Corpuscular Volume 89 82 - 98 fL    Mean Corpuscular Hemoglobin 28.9 27.0 - 31.0 pg    Mean Corpuscular Hemoglobin Conc 32.4 32.0 - 36.0 g/dL    RDW 14.0 11.5 - 14.5 %    Platelets 238 150 - 350 K/uL    MPV 9.7 9.2 - 12.9 fL    Immature Granulocytes 0.2 0.0 - 0.5 %    Gran # (ANC) 2.5 1.8 - 7.7 K/uL    Immature Grans (Abs) 0.01 0.00 - 0.04 K/uL    Lymph # 1.6 1.0 - 4.8 K/uL    Mono # 0.4 0.3 - 1.0 K/uL    Eos # 0.0 0.0 - 0.5 K/uL    Baso # 0.01 0.00 - 0.20 K/uL    nRBC 0 0 /100 WBC    Gran% 55.4 38.0 - 73.0 %    Lymph% 34.5 18.0 - 48.0 %    Mono% 9.7 4.0 - 15.0 %    Eosinophil% 0.0 0.0 - 8.0 %    Basophil% 0.2 0.0 - 1.9 %    Differential Method Automated    Comprehensive metabolic panel   Result Value Ref Range    Sodium 136 136 - 145 mmol/L    Potassium 4.0 3.5 - 5.1 mmol/L    Chloride 105 95 - 110 mmol/L    CO2 21 (L) 23 - 29 mmol/L    Glucose 94 70 - 110 mg/dL    BUN, Bld 16 8 - 23 mg/dL    Creatinine 1.5 (H) 0.5 - 1.4 mg/dL    Calcium 9.7 8.7 - 10.5 mg/dL    Total Protein 8.0 6.0 - 8.4 g/dL    Albumin 3.0 (L) 3.5 - 5.2 g/dL    Total Bilirubin 0.4 0.1 - 1.0 mg/dL    Alkaline Phosphatase 92 55 - 135 U/L    AST 53 (H) 10 - 40 U/L    ALT 19 10 - 44 U/L    Anion Gap 10 8 - 16 mmol/L    eGFR if African American 39 (A) >60 mL/min/1.73 m^2    eGFR if non African American 34 (A) >60 mL/min/1.73 m^2   CPK   Result Value Ref Range     (H) 20 - 180 U/L    Urinalysis, Reflex to Urine Culture Urine, Catheterized    Specimen: Urine   Result Value Ref Range    Specimen UA Urine, Catheterized     Color, UA Yellow Yellow, Straw, Shabnam    Appearance, UA Clear Clear    pH, UA 7.0 5.0 - 8.0    Specific Gravity, UA 1.020 1.005 - 1.030    Protein, UA Trace (A) Negative    Glucose, UA Negative Negative    Ketones, UA Negative Negative    Bilirubin (UA) Negative Negative    Occult Blood UA Trace (A) Negative    Nitrite, UA Positive (A) Negative    Urobilinogen, UA 4.0-6.0 (A) <2.0 EU/dL    Leukocytes, UA Negative Negative   Urinalysis Microscopic   Result Value Ref Range    RBC, UA 5 (H) 0 - 4 /hpf    WBC, UA 0 0 - 5 /hpf    Bacteria Few (A) None-Occ /hpf    Microscopic Comment SEE COMMENT          Imaging Results    None          The EKG was ordered, reviewed, and independently interpreted by the ED provider.  Interpretation time: 1507  Rate: 86 BPM  Rhythm: SR with marked sinus arrhythmia with premature supraventricular complexes  Interpretation: nonspecific ST and T wave abnormality. No STEMI.           The Emergency Provider reviewed the vital signs and test results, which are outlined above.     ED Discussion     4:00 PM: Dr. Lorenzo transfers care of patient to Dr. Morales pending lab results.    4:47 PM: Reassessed pt at this time.  Pt states her condition has improved at this time. Discussed with pt all pertinent ED information and results. Discussed pt dx and plan of tx. Gave pt all f/u and return to the ED instructions. All questions and concerns were addressed at this time. Pt expresses understanding of information and instructions, and is comfortable with plan to discharge. Pt is stable for discharge.    I discussed with patient and/or family/caretaker that evaluation in the ED does not suggest any emergent or life threatening medical conditions requiring immediate intervention beyond what was provided in the ED, and I believe patient is safe for discharge.   Regardless, an unremarkable evaluation in the ED does not preclude the development or presence of a serious of life threatening condition. As such, patient was instructed to return immediately for any worsening or change in current symptoms.     MDM     COVID-19 Rapid Screening  Order: [id number]  Status:  Final result   Visible to patient:  No (not released) Next appt:  08/31/2020 at 10:20 AM in Podiatry (Doretha Walter DPM)   Ref Range & Units 3d ago   SARS-CoV-2 RNA, Amplification, Qual Negative PositiveAbnormal     Comment: This test utilizes isothermal nucleic acid amplification   technology to detect the SARS-CoV-2 RdRp nucleic acid segment.   The analytical sensitivity (limit of detection) is 125 genome   equivalents/mL.   A POSITIVE result implies infection with the SARS-CoV-2 virus;   the patient is presumed to be contagious.     A NEGATIVE result means that SARS-CoV-2 nucleic acids are not   present above the limit of detection. A NEGATIVE result should be   treated as presumptive. It does not rule out the possibility of   COVID-19 and should not be the sole basis for treatment decisions.   If COVID-19 is strongly suspected based on clinical and exposure   history, re-testing using an alternate molecular assay should be   considered.   This test is only for use under the Food and Drug   Administration s Emergency Use Authorization (EUA).   Commercial kits are provided by [employer].   Performance characteristics of the EUA have been independently   verified by [facility] Department of   Pathology and Laboratory Medicine.   _________________________________________________________________   The ID NOW COVID-19 Letter of Authorization, along with the   authorized Fact Sheet for Healthcare Providers, the authorized Fact   Sheet for Patients, and authorized labeling are available on the FDA   website:   www.fda.gov/MedicalDevices/Safety/EmergencySituations/uof920482.htm    Resulting Agency   [facility]         Specimen Collected: 07/20/20 07:34 Last Resulted: 07/20/20 08:02                Medical Decision Making:   Clinical Tests:   Lab Tests: Ordered and Reviewed  Medical Tests: Ordered and Reviewed           ED Medication(s):  Medications   methocarbamoL tablet 500 mg (500 mg Oral Given 7/23/20 1539)   traMADoL tablet 50 mg (50 mg Oral Given 7/23/20 1538)   sulfamethoxazole-trimethoprim 800-160mg per tablet 1 tablet (1 tablet Oral Given 7/23/20 1733)       Discharge Medication List as of 7/23/2020  4:45 PM      START taking these medications    Details   sulfamethoxazole-trimethoprim 800-160mg (BACTRIM DS) 800-160 mg Tab Take 1 tablet by mouth 2 (two) times daily. for 7 days, Starting u 7/23/2020, Until u 7/30/2020, Print             Follow-up Information     Leif Townsend MD. Schedule an appointment as soon as possible for a visit in 1 week.    Specialty: Family Medicine  Contact information:  [address] [address]  [phone]                       Scribe Attestation:   Scribe #1: I performed the above scribed service and the documentation accurately describes the services I performed. I attest to the accuracy of the note.     Attending:   Physician Attestation Statement for Scribe #1: I, Columba Lorenzo MD, personally performed the services described in this documentation, as scribed by Santo Monreal, in my presence, and it is both accurate and complete.       Scribe Attestation:   Scribe #2: I performed the above scribed service and the documentation accurately describes the services I performed. I attest to the accuracy of the note.    Attending Attestation:           Physician Attestation for Scribe:    Physician Attestation Statement for Scribe #2: I, Lesly Morales MD, reviewed documentation, as scribed by Vee Noguera in my presence, and it is both accurate and complete. I also acknowledge and confirm the content of the note done by Scribe #1.            Clinical Impression       ICD-10-CM ICD-9-CM   1. Acute cystitis without hematuria  N30.00 595.0   2. SOB (shortness of breath)  R06.02 786.05       Disposition:   Disposition: Discharged  Condition: Stable       ED Disposition Condition    Discharge Stable             Lesly Morales MD  07/25/20 5323

## 2020-07-28 ENCOUNTER — HOSPITAL ENCOUNTER (INPATIENT)
Facility: HOSPITAL | Age: 75
LOS: 7 days | Discharge: NURSING, REHAB OR LONG-TERM CARE | DRG: 871 | End: 2020-08-04
Attending: EMERGENCY MEDICINE | Admitting: INTERNAL MEDICINE
Payer: MEDICARE

## 2020-07-28 DIAGNOSIS — R53.1 WEAKNESS: ICD-10-CM

## 2020-07-28 DIAGNOSIS — Z20.822 SUSPECTED COVID-19 VIRUS INFECTION: ICD-10-CM

## 2020-07-28 DIAGNOSIS — U07.1 PNEUMONIA DUE TO COVID-19 VIRUS: Primary | ICD-10-CM

## 2020-07-28 DIAGNOSIS — N17.9 AKI (ACUTE KIDNEY INJURY): ICD-10-CM

## 2020-07-28 DIAGNOSIS — R00.1 BRADYCARDIA: ICD-10-CM

## 2020-07-28 DIAGNOSIS — J12.82 PNEUMONIA DUE TO COVID-19 VIRUS: Primary | ICD-10-CM

## 2020-07-28 DIAGNOSIS — E11.49 DIABETES MELLITUS TYPE 2 WITH NEUROLOGICAL MANIFESTATIONS: ICD-10-CM

## 2020-07-28 PROBLEM — A41.9 SEVERE SEPSIS: Status: ACTIVE | Noted: 2020-07-28

## 2020-07-28 PROBLEM — R65.20 SEVERE SEPSIS: Status: ACTIVE | Noted: 2020-07-28

## 2020-07-28 LAB
ALBUMIN SERPL BCP-MCNC: 2.8 G/DL (ref 3.5–5.2)
ALP SERPL-CCNC: 82 U/L (ref 55–135)
ALT SERPL W/O P-5'-P-CCNC: 19 U/L (ref 10–44)
ANION GAP SERPL CALC-SCNC: 15 MMOL/L (ref 8–16)
AST SERPL-CCNC: 40 U/L (ref 10–40)
BASOPHILS # BLD AUTO: 0.01 K/UL (ref 0–0.2)
BASOPHILS NFR BLD: 0.2 % (ref 0–1.9)
BILIRUB SERPL-MCNC: 0.4 MG/DL (ref 0.1–1)
BNP SERPL-MCNC: 55 PG/ML (ref 0–99)
BUN SERPL-MCNC: 27 MG/DL (ref 8–23)
CALCIUM SERPL-MCNC: 10.1 MG/DL (ref 8.7–10.5)
CHLORIDE SERPL-SCNC: 106 MMOL/L (ref 95–110)
CK SERPL-CCNC: 91 U/L (ref 20–180)
CO2 SERPL-SCNC: 19 MMOL/L (ref 23–29)
CREAT SERPL-MCNC: 2.3 MG/DL (ref 0.5–1.4)
CRP SERPL-MCNC: 109.8 MG/L (ref 0–8.2)
DIFFERENTIAL METHOD: ABNORMAL
EOSINOPHIL # BLD AUTO: 0 K/UL (ref 0–0.5)
EOSINOPHIL NFR BLD: 0 % (ref 0–8)
ERYTHROCYTE [DISTWIDTH] IN BLOOD BY AUTOMATED COUNT: 14.4 % (ref 11.5–14.5)
EST. GFR  (AFRICAN AMERICAN): 23 ML/MIN/1.73 M^2
EST. GFR  (NON AFRICAN AMERICAN): 20 ML/MIN/1.73 M^2
GLUCOSE SERPL-MCNC: 100 MG/DL (ref 70–110)
HCT VFR BLD AUTO: 34.5 % (ref 37–48.5)
HGB BLD-MCNC: 11 G/DL (ref 12–16)
IMM GRANULOCYTES # BLD AUTO: 0.06 K/UL (ref 0–0.04)
IMM GRANULOCYTES NFR BLD AUTO: 1 % (ref 0–0.5)
LACTATE SERPL-SCNC: 1.4 MMOL/L (ref 0.5–2.2)
LDH SERPL L TO P-CCNC: 211 U/L (ref 110–260)
LYMPHOCYTES # BLD AUTO: 1.2 K/UL (ref 1–4.8)
LYMPHOCYTES NFR BLD: 19.9 % (ref 18–48)
MCH RBC QN AUTO: 28.6 PG (ref 27–31)
MCHC RBC AUTO-ENTMCNC: 31.9 G/DL (ref 32–36)
MCV RBC AUTO: 90 FL (ref 82–98)
MONOCYTES # BLD AUTO: 0.5 K/UL (ref 0.3–1)
MONOCYTES NFR BLD: 8 % (ref 4–15)
NEUTROPHILS # BLD AUTO: 4.4 K/UL (ref 1.8–7.7)
NEUTROPHILS NFR BLD: 70.9 % (ref 38–73)
NRBC BLD-RTO: 0 /100 WBC
PLATELET # BLD AUTO: 335 K/UL (ref 150–350)
PMV BLD AUTO: 9.9 FL (ref 9.2–12.9)
POCT GLUCOSE: 82 MG/DL (ref 70–110)
POTASSIUM SERPL-SCNC: 4.3 MMOL/L (ref 3.5–5.1)
PROCALCITONIN SERPL IA-MCNC: 0.13 NG/ML
PROT SERPL-MCNC: 8.5 G/DL (ref 6–8.4)
RBC # BLD AUTO: 3.84 M/UL (ref 4–5.4)
SODIUM SERPL-SCNC: 140 MMOL/L (ref 136–145)
TROPONIN I SERPL DL<=0.01 NG/ML-MCNC: 0.01 NG/ML (ref 0–0.03)
WBC # BLD AUTO: 6.23 K/UL (ref 3.9–12.7)

## 2020-07-28 PROCEDURE — 82728 ASSAY OF FERRITIN: CPT | Mod: HCWC

## 2020-07-28 PROCEDURE — 83615 LACTATE (LD) (LDH) ENZYME: CPT | Mod: HCWC

## 2020-07-28 PROCEDURE — 93005 ELECTROCARDIOGRAM TRACING: CPT | Mod: HCWC

## 2020-07-28 PROCEDURE — 25000003 PHARM REV CODE 250: Mod: HCWC | Performed by: INTERNAL MEDICINE

## 2020-07-28 PROCEDURE — 83605 ASSAY OF LACTIC ACID: CPT | Mod: HCWC

## 2020-07-28 PROCEDURE — 84145 PROCALCITONIN (PCT): CPT | Mod: HCWC

## 2020-07-28 PROCEDURE — 93010 ELECTROCARDIOGRAM REPORT: CPT | Mod: HCWC,,, | Performed by: INTERNAL MEDICINE

## 2020-07-28 PROCEDURE — 82962 GLUCOSE BLOOD TEST: CPT | Mod: HCWC

## 2020-07-28 PROCEDURE — 80053 COMPREHEN METABOLIC PANEL: CPT | Mod: HCWC

## 2020-07-28 PROCEDURE — 84484 ASSAY OF TROPONIN QUANT: CPT | Mod: HCWC

## 2020-07-28 PROCEDURE — 82550 ASSAY OF CK (CPK): CPT | Mod: HCWC

## 2020-07-28 PROCEDURE — 96361 HYDRATE IV INFUSION ADD-ON: CPT | Mod: HCWC

## 2020-07-28 PROCEDURE — 99285 EMERGENCY DEPT VISIT HI MDM: CPT | Mod: 25,HCWC

## 2020-07-28 PROCEDURE — 85025 COMPLETE CBC W/AUTO DIFF WBC: CPT | Mod: HCWC

## 2020-07-28 PROCEDURE — 93010 EKG 12-LEAD: ICD-10-PCS | Mod: HCWC,,, | Performed by: INTERNAL MEDICINE

## 2020-07-28 PROCEDURE — 25000003 PHARM REV CODE 250: Mod: HCWC | Performed by: EMERGENCY MEDICINE

## 2020-07-28 PROCEDURE — 36415 COLL VENOUS BLD VENIPUNCTURE: CPT | Mod: HCWC

## 2020-07-28 PROCEDURE — 96374 THER/PROPH/DIAG INJ IV PUSH: CPT | Mod: HCWC

## 2020-07-28 PROCEDURE — 21400001 HC TELEMETRY ROOM: Mod: HCWC

## 2020-07-28 PROCEDURE — 63600175 PHARM REV CODE 636 W HCPCS: Mod: HCWC | Performed by: INTERNAL MEDICINE

## 2020-07-28 PROCEDURE — 86140 C-REACTIVE PROTEIN: CPT | Mod: HCWC

## 2020-07-28 PROCEDURE — 83880 ASSAY OF NATRIURETIC PEPTIDE: CPT | Mod: HCWC

## 2020-07-28 RX ORDER — ALBUTEROL SULFATE 90 UG/1
2 AEROSOL, METERED RESPIRATORY (INHALATION) EVERY 4 HOURS PRN
Status: DISCONTINUED | OUTPATIENT
Start: 2020-07-28 | End: 2020-08-04 | Stop reason: HOSPADM

## 2020-07-28 RX ORDER — GLUCAGON 1 MG
1 KIT INJECTION
Status: DISCONTINUED | OUTPATIENT
Start: 2020-07-28 | End: 2020-08-04 | Stop reason: HOSPADM

## 2020-07-28 RX ORDER — SODIUM CHLORIDE 9 MG/ML
INJECTION, SOLUTION INTRAVENOUS CONTINUOUS
Status: DISCONTINUED | OUTPATIENT
Start: 2020-07-28 | End: 2020-07-29

## 2020-07-28 RX ORDER — ASCORBIC ACID 500 MG
500 TABLET ORAL DAILY
Status: DISCONTINUED | OUTPATIENT
Start: 2020-07-29 | End: 2020-08-04 | Stop reason: HOSPADM

## 2020-07-28 RX ORDER — DOXYCYCLINE HYCLATE 100 MG
100 TABLET ORAL EVERY 12 HOURS
Status: DISCONTINUED | OUTPATIENT
Start: 2020-07-28 | End: 2020-07-31

## 2020-07-28 RX ORDER — SODIUM CHLORIDE 9 MG/ML
1000 INJECTION, SOLUTION INTRAVENOUS
Status: COMPLETED | OUTPATIENT
Start: 2020-07-28 | End: 2020-07-28

## 2020-07-28 RX ORDER — METHYLPREDNISOLONE SOD SUCC 125 MG
40 VIAL (EA) INJECTION EVERY 6 HOURS
Status: DISCONTINUED | OUTPATIENT
Start: 2020-07-29 | End: 2020-07-29

## 2020-07-28 RX ORDER — ATORVASTATIN CALCIUM 40 MG/1
40 TABLET, FILM COATED ORAL DAILY
Status: DISCONTINUED | OUTPATIENT
Start: 2020-07-29 | End: 2020-08-04 | Stop reason: HOSPADM

## 2020-07-28 RX ORDER — IBUPROFEN 200 MG
24 TABLET ORAL
Status: DISCONTINUED | OUTPATIENT
Start: 2020-07-28 | End: 2020-08-04 | Stop reason: HOSPADM

## 2020-07-28 RX ORDER — THIAMINE HCL 100 MG
100 TABLET ORAL DAILY
Status: DISCONTINUED | OUTPATIENT
Start: 2020-07-29 | End: 2020-08-04 | Stop reason: HOSPADM

## 2020-07-28 RX ORDER — INSULIN ASPART 100 [IU]/ML
1-10 INJECTION, SOLUTION INTRAVENOUS; SUBCUTANEOUS
Status: DISCONTINUED | OUTPATIENT
Start: 2020-07-28 | End: 2020-08-04 | Stop reason: HOSPADM

## 2020-07-28 RX ORDER — IBUPROFEN 200 MG
16 TABLET ORAL
Status: DISCONTINUED | OUTPATIENT
Start: 2020-07-28 | End: 2020-08-04 | Stop reason: HOSPADM

## 2020-07-28 RX ADMIN — DOXYCYCLINE HYCLATE 100 MG: 100 TABLET, COATED ORAL at 10:07

## 2020-07-28 RX ADMIN — SODIUM CHLORIDE 500 ML: 0.9 INJECTION, SOLUTION INTRAVENOUS at 08:07

## 2020-07-28 RX ADMIN — SODIUM CHLORIDE: 0.9 INJECTION, SOLUTION INTRAVENOUS at 10:07

## 2020-07-28 RX ADMIN — METHYLPREDNISOLONE SODIUM SUCCINATE 40 MG: 125 INJECTION, POWDER, FOR SOLUTION INTRAMUSCULAR; INTRAVENOUS at 11:07

## 2020-07-28 RX ADMIN — CEFTRIAXONE 1 G: 1 INJECTION, SOLUTION INTRAVENOUS at 10:07

## 2020-07-28 RX ADMIN — SODIUM CHLORIDE 1000 ML: 0.9 INJECTION, SOLUTION INTRAVENOUS at 09:07

## 2020-07-29 LAB
ALBUMIN SERPL BCP-MCNC: 2.3 G/DL (ref 3.5–5.2)
ALP SERPL-CCNC: 66 U/L (ref 55–135)
ALT SERPL W/O P-5'-P-CCNC: 18 U/L (ref 10–44)
ANION GAP SERPL CALC-SCNC: 11 MMOL/L (ref 8–16)
AST SERPL-CCNC: 33 U/L (ref 10–40)
BASOPHILS # BLD AUTO: 0.01 K/UL (ref 0–0.2)
BASOPHILS NFR BLD: 0.2 % (ref 0–1.9)
BILIRUB SERPL-MCNC: 0.3 MG/DL (ref 0.1–1)
BUN SERPL-MCNC: 24 MG/DL (ref 8–23)
CALCIUM SERPL-MCNC: 9 MG/DL (ref 8.7–10.5)
CHLORIDE SERPL-SCNC: 111 MMOL/L (ref 95–110)
CO2 SERPL-SCNC: 17 MMOL/L (ref 23–29)
CREAT SERPL-MCNC: 1.8 MG/DL (ref 0.5–1.4)
D DIMER PPP IA.FEU-MCNC: 1.3 MG/L FEU
DIFFERENTIAL METHOD: ABNORMAL
EOSINOPHIL # BLD AUTO: 0 K/UL (ref 0–0.5)
EOSINOPHIL NFR BLD: 0 % (ref 0–8)
ERYTHROCYTE [DISTWIDTH] IN BLOOD BY AUTOMATED COUNT: 14.5 % (ref 11.5–14.5)
EST. GFR  (AFRICAN AMERICAN): 31 ML/MIN/1.73 M^2
EST. GFR  (NON AFRICAN AMERICAN): 27 ML/MIN/1.73 M^2
ESTIMATED AVG GLUCOSE: 126 MG/DL (ref 68–131)
FERRITIN SERPL-MCNC: 178 NG/ML (ref 20–300)
GLUCOSE SERPL-MCNC: 117 MG/DL (ref 70–110)
HBA1C MFR BLD HPLC: 6 % (ref 4–5.6)
HCT VFR BLD AUTO: 33.4 % (ref 37–48.5)
HGB BLD-MCNC: 10.3 G/DL (ref 12–16)
IMM GRANULOCYTES # BLD AUTO: 0.09 K/UL (ref 0–0.04)
IMM GRANULOCYTES NFR BLD AUTO: 1.5 % (ref 0–0.5)
LYMPHOCYTES # BLD AUTO: 0.6 K/UL (ref 1–4.8)
LYMPHOCYTES NFR BLD: 9.9 % (ref 18–48)
MCH RBC QN AUTO: 27.8 PG (ref 27–31)
MCHC RBC AUTO-ENTMCNC: 30.8 G/DL (ref 32–36)
MCV RBC AUTO: 90 FL (ref 82–98)
MONOCYTES # BLD AUTO: 0.2 K/UL (ref 0.3–1)
MONOCYTES NFR BLD: 3.2 % (ref 4–15)
NEUTROPHILS # BLD AUTO: 5.1 K/UL (ref 1.8–7.7)
NEUTROPHILS NFR BLD: 85.2 % (ref 38–73)
NRBC BLD-RTO: 0 /100 WBC
PLATELET # BLD AUTO: 302 K/UL (ref 150–350)
PMV BLD AUTO: 9.7 FL (ref 9.2–12.9)
POCT GLUCOSE: 124 MG/DL (ref 70–110)
POCT GLUCOSE: 153 MG/DL (ref 70–110)
POCT GLUCOSE: 154 MG/DL (ref 70–110)
POCT GLUCOSE: 192 MG/DL (ref 70–110)
POTASSIUM SERPL-SCNC: 4.5 MMOL/L (ref 3.5–5.1)
PROT SERPL-MCNC: 7.1 G/DL (ref 6–8.4)
RBC # BLD AUTO: 3.7 M/UL (ref 4–5.4)
SODIUM SERPL-SCNC: 139 MMOL/L (ref 136–145)
WBC # BLD AUTO: 5.93 K/UL (ref 3.9–12.7)

## 2020-07-29 PROCEDURE — S5010 5% DEXTROSE AND 0.45% SALINE: HCPCS | Mod: HCWC | Performed by: INTERNAL MEDICINE

## 2020-07-29 PROCEDURE — 63600175 PHARM REV CODE 636 W HCPCS: Mod: HCWC | Performed by: INTERNAL MEDICINE

## 2020-07-29 PROCEDURE — 97110 THERAPEUTIC EXERCISES: CPT | Mod: HCWC

## 2020-07-29 PROCEDURE — 97530 THERAPEUTIC ACTIVITIES: CPT | Mod: HCWC

## 2020-07-29 PROCEDURE — 36415 COLL VENOUS BLD VENIPUNCTURE: CPT | Mod: HCWC

## 2020-07-29 PROCEDURE — 85379 FIBRIN DEGRADATION QUANT: CPT | Mod: HCWC

## 2020-07-29 PROCEDURE — 85025 COMPLETE CBC W/AUTO DIFF WBC: CPT | Mod: HCWC

## 2020-07-29 PROCEDURE — 25000003 PHARM REV CODE 250: Mod: HCWC | Performed by: INTERNAL MEDICINE

## 2020-07-29 PROCEDURE — 97162 PT EVAL MOD COMPLEX 30 MIN: CPT | Mod: HCWC

## 2020-07-29 PROCEDURE — 80053 COMPREHEN METABOLIC PANEL: CPT | Mod: HCWC

## 2020-07-29 PROCEDURE — 83036 HEMOGLOBIN GLYCOSYLATED A1C: CPT | Mod: HCWC

## 2020-07-29 PROCEDURE — 21400001 HC TELEMETRY ROOM: Mod: HCWC

## 2020-07-29 RX ORDER — DEXTROSE MONOHYDRATE AND SODIUM CHLORIDE 5; .45 G/100ML; G/100ML
INJECTION, SOLUTION INTRAVENOUS CONTINUOUS
Status: DISCONTINUED | OUTPATIENT
Start: 2020-07-29 | End: 2020-08-02

## 2020-07-29 RX ORDER — ZINC SULFATE 50(220)MG
220 CAPSULE ORAL DAILY
Status: DISCONTINUED | OUTPATIENT
Start: 2020-07-30 | End: 2020-08-04 | Stop reason: HOSPADM

## 2020-07-29 RX ADMIN — INSULIN ASPART 2 UNITS: 100 INJECTION, SOLUTION INTRAVENOUS; SUBCUTANEOUS at 04:07

## 2020-07-29 RX ADMIN — DOXYCYCLINE HYCLATE 100 MG: 100 TABLET, COATED ORAL at 08:07

## 2020-07-29 RX ADMIN — INSULIN ASPART 1 UNITS: 100 INJECTION, SOLUTION INTRAVENOUS; SUBCUTANEOUS at 08:07

## 2020-07-29 RX ADMIN — METHYLPREDNISOLONE SODIUM SUCCINATE 40 MG: 125 INJECTION, POWDER, FOR SOLUTION INTRAMUSCULAR; INTRAVENOUS at 05:07

## 2020-07-29 RX ADMIN — METHYLPREDNISOLONE SODIUM SUCCINATE 40 MG: 125 INJECTION, POWDER, FOR SOLUTION INTRAMUSCULAR; INTRAVENOUS at 12:07

## 2020-07-29 RX ADMIN — DOXYCYCLINE HYCLATE 100 MG: 100 TABLET, COATED ORAL at 09:07

## 2020-07-29 RX ADMIN — OXYCODONE HYDROCHLORIDE AND ACETAMINOPHEN 500 MG: 500 TABLET ORAL at 09:07

## 2020-07-29 RX ADMIN — CEFTRIAXONE 1 G: 1 INJECTION, SOLUTION INTRAVENOUS at 12:07

## 2020-07-29 RX ADMIN — DEXTROSE MONOHYDRATE AND SODIUM CHLORIDE: 5; .45 INJECTION, SOLUTION INTRAVENOUS at 02:07

## 2020-07-29 RX ADMIN — ATORVASTATIN CALCIUM 40 MG: 40 TABLET, FILM COATED ORAL at 09:07

## 2020-07-29 RX ADMIN — CEFTRIAXONE 1 G: 1 INJECTION, SOLUTION INTRAVENOUS at 10:07

## 2020-07-29 RX ADMIN — Medication 100 MG: at 09:07

## 2020-07-29 RX ADMIN — INSULIN ASPART 2 UNITS: 100 INJECTION, SOLUTION INTRAVENOUS; SUBCUTANEOUS at 12:07

## 2020-07-29 NOTE — ASSESSMENT & PLAN NOTE
Possible Pre Renal from poor oral intake vs Intrinsic 2/2 COVID   IVF  Monitor   Avoid nephrotoxic meds

## 2020-07-29 NOTE — SUBJECTIVE & OBJECTIVE
Interval History:     Review of Systems   Unable to perform ROS: Mental status change     Objective:     Vital Signs (Most Recent):  Temp: 98.4 °F (36.9 °C) (07/29/20 1206)  Pulse: 73 (07/29/20 1510)  Resp: 20 (07/29/20 1510)  BP: 128/67 (07/29/20 1510)  SpO2: (!) 91 % (07/29/20 1510) Vital Signs (24h Range):  Temp:  [97.9 °F (36.6 °C)-99.2 °F (37.3 °C)] 98.4 °F (36.9 °C)  Pulse:  [60-82] 73  Resp:  [18-26] 20  SpO2:  [91 %-98 %] 91 %  BP: ()/(47-67) 128/67     Weight: 90 kg (198 lb 6.6 oz)  Body mass index is 33.02 kg/m².    Intake/Output Summary (Last 24 hours) at 7/29/2020 1606  Last data filed at 7/29/2020 1500  Gross per 24 hour   Intake 2285.25 ml   Output 600 ml   Net 1685.25 ml      Physical Exam  Vitals signs and nursing note reviewed.   Constitutional:       Appearance: She is well-developed.   HENT:      Head: Normocephalic and atraumatic.      Right Ear: External ear normal.      Left Ear: External ear normal.      Nose: Nose normal.   Eyes:      Conjunctiva/sclera: Conjunctivae normal.      Pupils: Pupils are equal, round, and reactive to light.   Neck:      Musculoskeletal: Normal range of motion and neck supple.      Thyroid: No thyromegaly.      Vascular: No JVD.   Cardiovascular:      Rate and Rhythm: Normal rate and regular rhythm.      Heart sounds: Normal heart sounds. No murmur. No friction rub. No gallop.    Pulmonary:      Effort: Pulmonary effort is normal. No respiratory distress.      Breath sounds: No stridor. Rales present. No wheezing.   Chest:      Chest wall: No tenderness.   Abdominal:      General: Bowel sounds are normal. There is no distension.      Palpations: Abdomen is soft. There is no mass.      Tenderness: There is no abdominal tenderness. There is no guarding or rebound.   Genitourinary:     Vagina: Normal. No vaginal discharge.   Musculoskeletal: Normal range of motion.         General: Swelling present. No deformity.   Lymphadenopathy:      Cervical: No cervical  adenopathy.   Skin:     General: Skin is warm.      Capillary Refill: Capillary refill takes less than 2 seconds.      Findings: No erythema or rash.   Neurological:      Mental Status: She is alert and oriented to person, place, and time.      Cranial Nerves: No cranial nerve deficit.      Sensory: No sensory deficit.      Deep Tendon Reflexes: Reflexes normal.   Psychiatric:         Behavior: Behavior normal.         Significant Labs: All pertinent labs within the past 24 hours have been reviewed.    Significant Imaging: I have reviewed all pertinent imaging results/findings within the past 24 hours.

## 2020-07-29 NOTE — ED NOTES
Patient incontinent of large amount of urine. Pericare performed and purwick applied. And depends as patient requested. Turned and reporistioned. Patient resting in bed, able to make needs known. No acute distress noted. Cart in low position, side rails up x 2 and call bell in reach

## 2020-07-29 NOTE — HPI
Mrs. Talley is a 74 yo F with Hx of HTN, DM, HLD, who presents for evaluation of SOB. Patient is COVID+ and has been seen in the ED  4 times in the last two weeks for similar complaints. She is on home oxygen. Today she reports worsening of her SOB, associated with fever, chills, and dry cough. She was unable to take care of herself and seeked help. In ED her labs were noted to have new onset SAMMI with low BP and CXR with worsening infiltration. Patient is admitted for further management.

## 2020-07-29 NOTE — PROGRESS NOTES
Spoke with William Talley, Grandson, requesting pt to be placed in a nursing home per his uncle and his request. States that it difficult to care for her at home and pt is unable to care for herself at home.   Request the following nursing homes in [place], [place]:   [facility]   [facility] [place]    [phone]  William Talley

## 2020-07-29 NOTE — ED NOTES
Turned and repositioned. Sleeping, easily aroused. VSS. Patient resting in bed, able to make needs known. No acute distress noted. Cart in low position, side rails up x 2 and call bell in reach

## 2020-07-29 NOTE — PLAN OF CARE
Mod a bed mobility; sit to/stand from bedside with RW and mod A - fear of falling; side steps by bed with mod A and b ue support; rec snf/transition to LTC

## 2020-07-29 NOTE — PLAN OF CARE
Problem: Fall Injury Risk  Goal: Absence of Fall and Fall-Related Injury  Outcome: Ongoing, Progressing   Sitter at bedside  Pt intermittently confused with time and situation  PT/OT consulted  CM consulted for SNF vs HH  D5 with 0.45% NS @ 75mL/hr per MD order  3.5L NC, oxygen saturation at 91%, NADN, NO SOB  Safety precautions followed and maintained

## 2020-07-29 NOTE — ED NOTES
"Patient found at the end of the bed, states " I have to pee". Patient reminded that she had a purwick and depends on, patient states " Oh yeah". Assisted back into bed with strong assist of 2. Patient resting in bed. No acute distress noted. Cart in low position, side rails up x 2 and call bell in reach  "

## 2020-07-29 NOTE — ASSESSMENT & PLAN NOTE
- COVID-19 testing   - Infection Control notified     - Isolation:   - Airborne, Contact and Droplet Precautions  - Cohort patients into COVID units  - N95 mask, wear eye protection  - 20 second hand hygiene              - Limit visitors per hospital policy              - Consolidating lab draws, nursing care, provider visits, and interventions    - Diagnostics: (leukopenia, hyponatremia, hyperferritinemia, elevated troponin, elevated d-dimer, age, and comorbidities are significant predictors of poor clinical outcome)  CBC, CMP, Ferritin, CRP, LDH, BNP, Troponin and Portable CXR    - Management:  Supplemental O2 to maintain SpO2 >92%  Continuous/intermittent Pulse Ox  Albuterol treatment PRN    -Cont zinc , vit c  -Start decadron  -The case was discussed with Her grandson William Talley elmer ( He Voice is the POA, Meera number [phone]). He voice want to HOLD on remdesivir Tx for now . He will make a finial decision tomorrow   -Doxycycline and Ceftriaxone

## 2020-07-29 NOTE — PLAN OF CARE
Spoke with son, Sukhjinder over the phone as patient is somewhat confused. Patient lives with son who states he is unable to take care of her and has no family members that are able to care for her.  He stated that she will not have a place to live in 2 days because the rent is due and he does not have money to pay for it.  He feels patient is ready for long term care.  Attempted to explain to the son the process for obtaining long term care placement but he was unable to comprehend his role.  Discussed seeing if patient would qualify for SNF placement. Advised patient could not stay in the hospital looking for placement after she was medically stable.  Patient was independent with her care several months ago but has slowly deteriorated and becoming more confused.  She was using a rollator for ambulation.  She has oxygen and pulse oximeter prescribed at an ER visit. Son stated he blaze like her at [facility] or [facility].  Preference obtained.    Transitional Care Folder, Discharge Planning Begins on Admission pamphlet, [facility], Advance Directive information given to patient along with the contact information given.Instructed patient or family to call with any questions or concerns.     D/c plan: SNF vs home health  D/c transportation: facility vs son  Preferred Pharmacy:  [facility] [place]  PCP:  Leif Townsend MD who recently .  His office is attempting to locate a PCP for the patient.       20 1342   Discharge Assessment   Assessment Type Discharge Planning Assessment   Confirmed/corrected address and phone number on facesheet? Yes   Assessment information obtained from? Caregiver   Prior to hospitilization cognitive status: Unable to Assess   Prior to hospitalization functional status: Needs Assistance;Assistive Equipment   Current cognitive status: Unable to Assess   Current Functional Status: Needs Assistance;Assistive Equipment   Lives With child(juana),  adult   Able to Return to Prior Arrangements no   Is patient able to care for self after discharge? No   Who are your caregiver(s) and their phone number(s)? Sukhjinder Talley; son [phone]   Patient's perception of discharge disposition skilled nursing facility   Readmission Within the Last 30 Days no previous admission in last 30 days   Patient currently being followed by outpatient case management? No   Patient currently receives any other outside agency services? No   Equipment Currently Used at Home glucometer;bedside commode;rollator;oxygen   Do you have any problems affording any of your prescribed medications? No   Is the patient taking medications as prescribed? yes   Does the patient have transportation home? Yes   Transportation Anticipated family or friend will provide   Does the patient receive services at the [facility]? No   Discharge Plan A Skilled Nursing Facility   DME Needed Upon Discharge    (tbd)   Patient/Family in Agreement with Plan yes

## 2020-07-29 NOTE — ED NOTES
"Patient restless and keeps saying "I have to pee" patient unable to stand or urinate on the bedpan. Tender over pelvic bone. Bladder scanned for 840cc. Will place hammonds for urinary retention.   "

## 2020-07-29 NOTE — PROGRESS NOTES
[facility] Hospital Medicine  Progress Note    Patient Name: Dilia Talley  MRN: [id number]  Patient Class: IP- Inpatient   Admission Date: 7/28/2020  Length of Stay: 1 days  Attending Physician: Carlton Del Angel, *  Primary Care Provider: Leif Townsend MD        Subjective:     Principal Problem:Pneumonia due to COVID-19 virus        HPI:  Mrs. Talley is a 72 yo F with Hx of HTN, DM, HLD, who presents for evaluation of SOB. Patient is COVID+ and has been seen in the ED  4 times in the last two weeks for similar complaints. She is on home oxygen. Today she reports worsening of her SOB, associated with fever, chills, and dry cough. She was unable to take care of herself and seeked help. In ED her labs were noted to have new onset SAMMI with low BP and CXR with worsening infiltration. Patient is admitted for further management.                  Overview/Hospital Course:  7/29 pt was seen and examined at bedside  . She is aao x 2 in nad . She is pleasant confuse . She follow commands . She is  Complaining of SOB .  The case was discussed with Her grandson William Talley iv ( He Voice is the POA, Meera number [phone])    Interval History:     Review of Systems   Unable to perform ROS: Mental status change     Objective:     Vital Signs (Most Recent):  Temp: 98.4 °F (36.9 °C) (07/29/20 1206)  Pulse: 73 (07/29/20 1510)  Resp: 20 (07/29/20 1510)  BP: 128/67 (07/29/20 1510)  SpO2: (!) 91 % (07/29/20 1510) Vital Signs (24h Range):  Temp:  [97.9 °F (36.6 °C)-99.2 °F (37.3 °C)] 98.4 °F (36.9 °C)  Pulse:  [60-82] 73  Resp:  [18-26] 20  SpO2:  [91 %-98 %] 91 %  BP: ()/(47-67) 128/67     Weight: 90 kg (198 lb 6.6 oz)  Body mass index is 33.02 kg/m².    Intake/Output Summary (Last 24 hours) at 7/29/2020 1606  Last data filed at 7/29/2020 1500  Gross per 24 hour   Intake 2285.25 ml   Output 600 ml   Net 1685.25 ml      Physical Exam  Vitals signs and nursing note reviewed.   Constitutional:        Appearance: She is well-developed.   HENT:      Head: Normocephalic and atraumatic.      Right Ear: External ear normal.      Left Ear: External ear normal.      Nose: Nose normal.   Eyes:      Conjunctiva/sclera: Conjunctivae normal.      Pupils: Pupils are equal, round, and reactive to light.   Neck:      Musculoskeletal: Normal range of motion and neck supple.      Thyroid: No thyromegaly.      Vascular: No JVD.   Cardiovascular:      Rate and Rhythm: Normal rate and regular rhythm.      Heart sounds: Normal heart sounds. No murmur. No friction rub. No gallop.    Pulmonary:      Effort: Pulmonary effort is normal. No respiratory distress.      Breath sounds: No stridor. Rales present. No wheezing.   Chest:      Chest wall: No tenderness.   Abdominal:      General: Bowel sounds are normal. There is no distension.      Palpations: Abdomen is soft. There is no mass.      Tenderness: There is no abdominal tenderness. There is no guarding or rebound.   Genitourinary:     Vagina: Normal. No vaginal discharge.   Musculoskeletal: Normal range of motion.         General: Swelling present. No deformity.   Lymphadenopathy:      Cervical: No cervical adenopathy.   Skin:     General: Skin is warm.      Capillary Refill: Capillary refill takes less than 2 seconds.      Findings: No erythema or rash.   Neurological:      Mental Status: She is alert and oriented to person, place, and time.      Cranial Nerves: No cranial nerve deficit.      Sensory: No sensory deficit.      Deep Tendon Reflexes: Reflexes normal.   Psychiatric:         Behavior: Behavior normal.         Significant Labs: All pertinent labs within the past 24 hours have been reviewed.    Significant Imaging: I have reviewed all pertinent imaging results/findings within the past 24 hours.      Assessment/Plan:      * Pneumonia due to COVID-19 virus  - COVID-19 testing   - Infection Control notified     - Isolation:   - Airborne, Contact and Droplet Precautions  -  Cohort patients into COVID units  - N95 mask, wear eye protection  - 20 second hand hygiene              - Limit visitors per hospital policy              - Consolidating lab draws, nursing care, provider visits, and interventions    - Diagnostics: (leukopenia, hyponatremia, hyperferritinemia, elevated troponin, elevated d-dimer, age, and comorbidities are significant predictors of poor clinical outcome)  CBC, CMP, Ferritin, CRP, LDH, BNP, Troponin and Portable CXR    - Management:  Supplemental O2 to maintain SpO2 >92%  Continuous/intermittent Pulse Ox  Albuterol treatment PRN    -Cont zinc , vit c  -Start decadron  -The case was discussed with Her grandson William Talley iv ( He Voice is the POA, Meera number [phone]). He voice want to HOLD on remdesivir Tx for now . He will make a finial decision tomorrow   -Doxycycline and Ceftriaxone                     SAMMI (acute kidney injury)  Possible Pre Renal from poor oral intake vs Intrinsic 2/2 COVID   IVF  Monitor   Avoid nephrotoxic meds       Severe sepsis  S/p IVF, Cultures and ABX   Monitor       Diabetes mellitus type 2 with neurological manifestations  Hba1c 6  ISS  Diabetic Diet   She look dehydrated . We will start D51/2 ns . She does not have a good appetite          VTE Risk Mitigation (From admission, onward)    None                Carlton Del Angel MD  Department of Hospital Medicine   [facility]

## 2020-07-29 NOTE — ED PROVIDER NOTES
SCRIBE #1 NOTE: ISadaf, am scribing for, and in the presence of, Jeff Fregoso MD. I have scribed the HPI, ROS, PEx.     SCRIBE #2 NOTE: IRufino, am scribing for, and in the presence of,  Ricardo Abdul MD. I have scribed the remaining portions of the note not scribed by Scribe #1.     History      Chief Complaint   Patient presents with    Shortness of Breath     covid +. on home 02       Review of patient's allergies indicates:  No Known Allergies     HPI   HPI    7/28/2020, 7:42 PM   History obtained from the patient      History of Present Illness: Dilia Talley is a 73 y.o. female patient with a PMHx of DM, HTN, Neuropathy, Obese who presents to the Emergency Department for SOB. Pt is covid positive and is on at home oxygen. Pt is unable to be taken care of at home. Daughter states pt keeps taking her oxygen off. No associated sxs. Pt denies any fever, leg pain/swelling, CP, cough, N/V/D, congestion, rhinorrhea, chills, sore throat, HA, dizziness, and all other sxs at this time. No prior Tx included. No further complaints or concerns at this time.       Arrival mode: AASI    PCP: Leif Townsend MD       Past Medical History:  Past Medical History:   Diagnosis Date    Arthritis     Diabetes     Gout     HTN (hypertension)     Neuropathy     Obese        Past Surgical History:  Past Surgical History:   Procedure Laterality Date    UMBILICAL HERNIA REPAIR           Family History:  Family History   Problem Relation Age of Onset    Diabetes Sister     Hypertension Sister        Social History:  Social History     Tobacco Use    Smoking status: Never Smoker    Smokeless tobacco: Never Used   Substance and Sexual Activity    Alcohol use: No    Drug use: No    Sexual activity: Not Currently     Partners: Male     Birth control/protection: None       ROS   Review of Systems   Constitutional: Negative for chills and fever.   HENT: Negative for congestion, rhinorrhea and sore  throat.    Respiratory: Positive for shortness of breath. Negative for cough.    Cardiovascular: Negative for chest pain and leg swelling.   Gastrointestinal: Negative for diarrhea, nausea and vomiting.   Genitourinary: Negative for dysuria.   Musculoskeletal: Negative for back pain.        (-) leg pain   Skin: Negative for rash.   Neurological: Negative for dizziness, weakness and headaches.   Hematological: Does not bruise/bleed easily.   All other systems reviewed and are negative.    Physical Exam      Initial Vitals   BP Pulse Resp Temp SpO2   07/28/20 1924 07/28/20 1924 07/28/20 1924 07/28/20 2028 07/28/20 1924   (!) 91/51 74 18 98.9 °F (37.2 °C) 98 %      MAP       --                 Physical Exam  Nursing Notes and Vital Signs Reviewed.  Constitutional: Patient is in no acute distress. Well-developed and well-nourished. Elderly. Frail.   Head: Atraumatic. Normocephalic.  Eyes: PERRL. EOM intact. Conjunctivae are not pale. No scleral icterus.  ENT: Mucous membranes are dry. Oropharynx is clear and symmetric.    Neck: Supple. Full ROM. No lymphadenopathy.  Cardiovascular: Regular rate. Regular rhythm. No murmurs, rubs, or gallops. Distal pulses are 2+ and symmetric.  Pulmonary/Chest: No respiratory distress. Clear to auscultation bilaterally. No wheezing or rales.  Abdominal: Soft and non-distended.  There is no tenderness.  No rebound, guarding, or rigidity. Good bowel sounds.  Genitourinary: No CVA tenderness  Musculoskeletal: Moves all extremities. No obvious deformities. No edema. No calf tenderness.  Skin: Warm and dry.  Neurological:  Alert, awake, and appropriate.  Normal speech.  No acute focal neurological deficits are appreciated.  Psychiatric: Normal affect. Good eye contact. Appropriate in content.    ED Course    Procedures  ED Vital Signs:  Vitals:    07/28/20 1924 07/28/20 1934 07/28/20 2000 07/28/20 2028   BP: (!) 91/51 125/65 (!) 109/53    Pulse: 74 80 79    Resp: 18  (!) 22    Temp:    98.9  "°F (37.2 °C)   TempSrc:    Oral   SpO2: 98% 96% (!) 91%    Weight:    93.2 kg (205 lb 8 oz)   Height: 5' 5" (1.651 m)       07/28/20 2130 07/28/20 2230 07/28/20 2357 07/29/20 0344   BP:  (!) 123/57     Pulse: 82 81 82 76   Resp: (!) 24 (!) 24 (!) 24 (!) 26   Temp: 98.7 °F (37.1 °C)  99.2 °F (37.3 °C)    TempSrc: Oral  Oral    SpO2: 97% (!) 92% 95% (!) 93%   Weight:       Height:        07/29/20 0430 07/29/20 0441   BP: (!) 110/58    Pulse: 71 71   Resp: (!) 22    Temp: 98.8 °F (37.1 °C)    TempSrc:     SpO2: 95%    Weight:     Height:         Abnormal Lab Results:  Labs Reviewed   CBC W/ AUTO DIFFERENTIAL - Abnormal; Notable for the following components:       Result Value    RBC 3.84 (*)     Hemoglobin 11.0 (*)     Hematocrit 34.5 (*)     Mean Corpuscular Hemoglobin Conc 31.9 (*)     Immature Granulocytes 1.0 (*)     Immature Grans (Abs) 0.06 (*)     All other components within normal limits   COMPREHENSIVE METABOLIC PANEL - Abnormal; Notable for the following components:    CO2 19 (*)     BUN, Bld 27 (*)     Creatinine 2.3 (*)     Total Protein 8.5 (*)     Albumin 2.8 (*)     eGFR if  23 (*)     eGFR if non  20 (*)     All other components within normal limits   C-REACTIVE PROTEIN - Abnormal; Notable for the following components:    .8 (*)     All other components within normal limits   CBC W/ AUTO DIFFERENTIAL - Abnormal; Notable for the following components:    RBC 3.70 (*)     Hemoglobin 10.3 (*)     Hematocrit 33.4 (*)     Mean Corpuscular Hemoglobin Conc 30.8 (*)     Immature Granulocytes 1.5 (*)     Immature Grans (Abs) 0.09 (*)     Lymph # 0.6 (*)     Mono # 0.2 (*)     Gran% 85.2 (*)     Lymph% 9.9 (*)     Mono% 3.2 (*)     All other components within normal limits   COMPREHENSIVE METABOLIC PANEL - Abnormal; Notable for the following components:    Chloride 111 (*)     CO2 17 (*)     Glucose 117 (*)     BUN, Bld 24 (*)     Creatinine 1.8 (*)     Albumin 2.3 (*)     " eGFR if  31 (*)     eGFR if non  27 (*)     All other components within normal limits   D DIMER, QUANTITATIVE - Abnormal; Notable for the following components:    D-Dimer 1.30 (*)     All other components within normal limits   POCT GLUCOSE - Abnormal; Notable for the following components:    POCT Glucose 124 (*)     All other components within normal limits   FERRITIN   LACTATE DEHYDROGENASE   CK   LACTIC ACID, PLASMA   TROPONIN I   B-TYPE NATRIURETIC PEPTIDE   PROCALCITONIN   HEMOGLOBIN A1C   POCT GLUCOSE   POCT GLUCOSE MONITORING CONTINUOUS        All Lab Results:  Results for orders placed or performed during the hospital encounter of 07/28/20   CBC auto differential   Result Value Ref Range    WBC 6.23 3.90 - 12.70 K/uL    RBC 3.84 (L) 4.00 - 5.40 M/uL    Hemoglobin 11.0 (L) 12.0 - 16.0 g/dL    Hematocrit 34.5 (L) 37.0 - 48.5 %    Mean Corpuscular Volume 90 82 - 98 fL    Mean Corpuscular Hemoglobin 28.6 27.0 - 31.0 pg    Mean Corpuscular Hemoglobin Conc 31.9 (L) 32.0 - 36.0 g/dL    RDW 14.4 11.5 - 14.5 %    Platelets 335 150 - 350 K/uL    MPV 9.9 9.2 - 12.9 fL    Immature Granulocytes 1.0 (H) 0.0 - 0.5 %    Gran # (ANC) 4.4 1.8 - 7.7 K/uL    Immature Grans (Abs) 0.06 (H) 0.00 - 0.04 K/uL    Lymph # 1.2 1.0 - 4.8 K/uL    Mono # 0.5 0.3 - 1.0 K/uL    Eos # 0.0 0.0 - 0.5 K/uL    Baso # 0.01 0.00 - 0.20 K/uL    nRBC 0 0 /100 WBC    Gran% 70.9 38.0 - 73.0 %    Lymph% 19.9 18.0 - 48.0 %    Mono% 8.0 4.0 - 15.0 %    Eosinophil% 0.0 0.0 - 8.0 %    Basophil% 0.2 0.0 - 1.9 %    Differential Method Automated    Comprehensive metabolic panel   Result Value Ref Range    Sodium 140 136 - 145 mmol/L    Potassium 4.3 3.5 - 5.1 mmol/L    Chloride 106 95 - 110 mmol/L    CO2 19 (L) 23 - 29 mmol/L    Glucose 100 70 - 110 mg/dL    BUN, Bld 27 (H) 8 - 23 mg/dL    Creatinine 2.3 (H) 0.5 - 1.4 mg/dL    Calcium 10.1 8.7 - 10.5 mg/dL    Total Protein 8.5 (H) 6.0 - 8.4 g/dL    Albumin 2.8 (L) 3.5 - 5.2  g/dL    Total Bilirubin 0.4 0.1 - 1.0 mg/dL    Alkaline Phosphatase 82 55 - 135 U/L    AST 40 10 - 40 U/L    ALT 19 10 - 44 U/L    Anion Gap 15 8 - 16 mmol/L    eGFR if African American 23 (A) >60 mL/min/1.73 m^2    eGFR if non African American 20 (A) >60 mL/min/1.73 m^2   C-Reactive Protein   Result Value Ref Range    .8 (H) 0.0 - 8.2 mg/L   Ferritin   Result Value Ref Range    Ferritin 178 20.0 - 300.0 ng/mL   Lactate Dehydrogenase   Result Value Ref Range     110 - 260 U/L   CK   Result Value Ref Range    CPK 91 20 - 180 U/L   Lactic Acid, Plasma   Result Value Ref Range    Lactate (Lactic Acid) 1.4 0.5 - 2.2 mmol/L   Troponin I   Result Value Ref Range    Troponin I 0.008 0.000 - 0.026 ng/mL   Brain Natriuretic Peptide   Result Value Ref Range    BNP 55 0 - 99 pg/mL   Procalcitonin   Result Value Ref Range    Procalcitonin 0.13 <0.25 ng/mL   CBC auto differential   Result Value Ref Range    WBC 5.93 3.90 - 12.70 K/uL    RBC 3.70 (L) 4.00 - 5.40 M/uL    Hemoglobin 10.3 (L) 12.0 - 16.0 g/dL    Hematocrit 33.4 (L) 37.0 - 48.5 %    Mean Corpuscular Volume 90 82 - 98 fL    Mean Corpuscular Hemoglobin 27.8 27.0 - 31.0 pg    Mean Corpuscular Hemoglobin Conc 30.8 (L) 32.0 - 36.0 g/dL    RDW 14.5 11.5 - 14.5 %    Platelets 302 150 - 350 K/uL    MPV 9.7 9.2 - 12.9 fL    Immature Granulocytes 1.5 (H) 0.0 - 0.5 %    Gran # (ANC) 5.1 1.8 - 7.7 K/uL    Immature Grans (Abs) 0.09 (H) 0.00 - 0.04 K/uL    Lymph # 0.6 (L) 1.0 - 4.8 K/uL    Mono # 0.2 (L) 0.3 - 1.0 K/uL    Eos # 0.0 0.0 - 0.5 K/uL    Baso # 0.01 0.00 - 0.20 K/uL    nRBC 0 0 /100 WBC    Gran% 85.2 (H) 38.0 - 73.0 %    Lymph% 9.9 (L) 18.0 - 48.0 %    Mono% 3.2 (L) 4.0 - 15.0 %    Eosinophil% 0.0 0.0 - 8.0 %    Basophil% 0.2 0.0 - 1.9 %    Differential Method Automated    Comprehensive metabolic panel   Result Value Ref Range    Sodium 139 136 - 145 mmol/L    Potassium 4.5 3.5 - 5.1 mmol/L    Chloride 111 (H) 95 - 110 mmol/L    CO2 17 (L) 23 - 29 mmol/L     Glucose 117 (H) 70 - 110 mg/dL    BUN, Bld 24 (H) 8 - 23 mg/dL    Creatinine 1.8 (H) 0.5 - 1.4 mg/dL    Calcium 9.0 8.7 - 10.5 mg/dL    Total Protein 7.1 6.0 - 8.4 g/dL    Albumin 2.3 (L) 3.5 - 5.2 g/dL    Total Bilirubin 0.3 0.1 - 1.0 mg/dL    Alkaline Phosphatase 66 55 - 135 U/L    AST 33 10 - 40 U/L    ALT 18 10 - 44 U/L    Anion Gap 11 8 - 16 mmol/L    eGFR if African American 31 (A) >60 mL/min/1.73 m^2    eGFR if non African American 27 (A) >60 mL/min/1.73 m^2   D dimer, quantitative   Result Value Ref Range    D-Dimer 1.30 (H) <0.50 mg/L FEU   POCT glucose   Result Value Ref Range    POCT Glucose 82 70 - 110 mg/dL   POCT glucose   Result Value Ref Range    POCT Glucose 124 (H) 70 - 110 mg/dL         Imaging Results:  Imaging Results          X-Ray Chest AP Portable (Final result)  Result time 07/28/20 21:13:07    Final result by Ar Mason MD (07/28/20 21:13:07)                 Impression:      Bilateral lung infiltrates.      Electronically signed by: Ar Mason MD  Date:    07/28/2020  Time:    21:13             Narrative:    EXAMINATION:  XR CHEST AP PORTABLE    CLINICAL HISTORY:  Suspected Covid-19 Virus Infection;    COMPARISON:  Chest x-ray, 07/26/2020    FINDINGS:  Patchy ground-glass interstitial infiltrates are seen throughout both lungs.  Heart size is top-normal.  No pleural effusion or pneumothorax.                               The EKG was ordered, reviewed, and independently interpreted by the ED provider.  Interpretation time: 2043  Rate: 78 BPM  Rhythm: Sinus rhythm with marked sinus arrhythmia  Interpretation: Nonspecific ST and T wave abnormality. Abnormal ECG. No STEMI.           The Emergency Provider reviewed the vital signs and test results, which are outlined above.    ED Discussion     8:00 PM: Dr. Jeff Fregoso transfers care of patient to Dr. Ricardo Abdul pending results.    9:56 PM: Discussed case with Baylee Smith NP ([facility]). Dr. Porter agrees with  current care and management of pt and accepts admission.   Admitting Service: Hospital Medicine  Admitting Physician: Dr. Porter  Admit to: Inpatient Tele    9:57 PM: Re-evaluated pt. I have discussed test results, shared treatment plan, and the need for admission with patient and family at bedside. Pt and family express understanding at this time and agree with all information. All questions answered. Pt and family have no further questions or concerns at this time. Pt is ready for admit.       ED Medication(s):  Medications   atorvastatin tablet 40 mg (has no administration in time range)   doxycycline tablet 100 mg (100 mg Oral Given 7/28/20 2255)   cefTRIAXone (ROCEPHIN) 1 g/50 mL D5W IVPB (0 g Intravenous Stopped 7/28/20 2349)   glucose chewable tablet 16 g (has no administration in time range)   glucose chewable tablet 24 g (has no administration in time range)   dextrose 50% injection 12.5 g (has no administration in time range)   dextrose 50% injection 25 g (has no administration in time range)   glucagon (human recombinant) injection 1 mg (has no administration in time range)   insulin aspart U-100 pen 1-10 Units (1 Units Subcutaneous Not Given 7/28/20 2300)   methylPREDNISolone sodium succinate injection 40 mg (40 mg Intravenous Given 7/28/20 2355)   thiamine tablet 100 mg (has no administration in time range)   ascorbic acid (vitamin C) tablet 500 mg (has no administration in time range)   albuterol inhaler 2 puff (has no administration in time range)   0.9%  NaCl infusion ( Intravenous New Bag 7/28/20 2249)   sodium chloride 0.9% bolus 500 mL (0 mLs Intravenous Stopped 7/28/20 2130)   0.9%  NaCl infusion (0 mLs Intravenous Stopped 7/28/20 2239)             Medical Decision Making    Medical Decision Making:   Clinical Tests:   Lab Tests: Ordered and Reviewed  Radiological Study: Ordered and Reviewed  Medical Tests: Ordered and Reviewed           Scribe Attestation:   Scribe #1: I performed the above  scribed service and the documentation accurately describes the services I performed. I attest to the accuracy of the note.    Attending:   Physician Attestation Statement for Scribe #1: I, Jeff Fregoso,*, personally performed the services described in this documentation, as scribed by Sadaf Putnam, in my presence, and it is both accurate and complete.       Scribe Attestation:   Scribe #2: I performed the above scribed service and the documentation accurately describes the services I performed. I attest to the accuracy of the note.    Attending Attestation:           Physician Attestation for Scribe:    Physician Attestation Statement for Scribe #2: I, Ricardo Abdul MD, reviewed documentation, as scribed by Rufino Taylor in my presence, and it is both accurate and complete. I also acknowledge and confirm the content of the note done by Scribe #1.          Clinical Impression       ICD-10-CM ICD-9-CM   1. Pneumonia due to COVID-19 virus  U07.1     J12.89    2. Suspected Covid-19 Virus Infection  R68.89    3. SAMMI (acute kidney injury)  N17.9 584.9   4. Weakness  R53.1 780.79       Disposition:   Disposition: Other (Inpatient Tele)  Condition: Fair         Ricardo Abdul MD  07/29/20 0547

## 2020-07-29 NOTE — PT/OT/SLP EVAL
"Physical Therapy Evaluation    Patient Name:  Dilia Talley   MRN:  [id number]    Recommendations:     Discharge Recommendations:  nursing facility, skilled, nursing facility, basic   Discharge Equipment Recommendations: (tbd - maybe a w/c)   Barriers to discharge: None    Assessment:     Dilia Talley is a 73 y.o. female admitted with a medical diagnosis of Pneumonia due to COVID-19 virus.  She presents with the following impairments/functional limitations:  impaired endurance, impaired balance, decreased lower extremity function, decreased coordination, impaired functional mobilty, impaired self care skills, impaired cognition, decreased safety awareness, gait instability, weakness.    Rehab Prognosis: Fair/good for stated goals; patient would benefit from acute skilled PT services to address these deficits and reach maximum level of function.    Recent Surgery: * No surgery found *      Plan:     During this hospitalization, patient to be seen 5 x/week to address the identified rehab impairments via gait training, therapeutic activities, therapeutic exercises and progress toward the following goals:    · Plan of Care Expires:  08/05/20    Subjective     Chief Complaint: weakness; "cold"  Patient/Family Comments/goals: to get better/stronger and walk  Pain/Comfort:  · Pain Rating 1: 0/10(soreness only)    Patients cultural, spiritual, Rastafari conflicts given the current situation:      Living Environment:  Lives with son but son said he cannot care for mom anymore and looking into transition to [facility]  Prior to admission, patients level of function was unsure of plof/?historian.  Equipment used at home: oxygen, bedside commode, rollator, glucometer.  DME owned (not currently used): none.  Upon discharge, patient will have assistance from nh staff/family.    Objective:     Communicated with RN prior to session.  Patient found right sidelying with peripheral IV, oxygen, hammonds catheter, bed alarm  upon PT entry to " room.    General Precautions: Standard, fall, respiratory, airborne, contact, droplet   Orthopedic Precautions:N/A   Braces: N/A     Exams:  · B LE ROM WFL and strength grossly 3+/5    Functional Mobility:  · Bed Mobility - mod A supine to sit and min a sit to supine with cues for log-rolling and use of bed rail  · Transfers - sit to/from stand handheld A x 2 and mod a for balance; seated scooting mod a  · Gt - 3 side steps only mod a for balance - inc fear of falling    Therapeutic Activities and Exercises:   PT educated patient/sitter on POC, B LE TE to prep mobility and safety/fall precautions with mobility.    AM-PAC 6 CLICK MOBILITY  Total Score:10     Patient left left sidelying with all lines intact, call button in reach, bed alarm on, sitter notified and sitter present.    GOALS:   Multidisciplinary Problems     Physical Therapy Goals        Problem: Physical Therapy Goal    Goal Priority Disciplines Outcome Goal Variances Interventions   Physical Therapy Goal     PT, PT/OT      Description: 1. Patient will perform supine to/from sit cga  2. Patient will perform sit to/from stand with RW cga  3. Patient will ambulate 50ft RW min a                   History:     Past Medical History:   Diagnosis Date    Arthritis     Diabetes     Gout     HTN (hypertension)     Neuropathy     Obese        Past Surgical History:   Procedure Laterality Date    UMBILICAL HERNIA REPAIR         Time Tracking:     PT Received On: 07/29/20  PT Start Time: 1555     PT Stop Time: 1635  PT Total Time (min): 40 min     Billable Minutes: Evaluation 15, Therapeutic Activity 15 and Therapeutic Exercise 10      Abdi Anton, PT  07/29/2020

## 2020-07-29 NOTE — PROGRESS NOTES
"Pt transferred to room 215 via stretcher. Pt lying supine in bed. SOB noted on 2L NC, oxygen sats 85%, Pt now on 3.5L NC, oxygen sat 93%, respiratory notified. Blood pressure (!) 116/57, pulse 63, temperature 97.9 °F (36.6 °C), temperature source Oral, resp. rate 18, height 5' 5" (1.651 m), weight 93.2 kg (205 lb 8 oz), SpO2 (!) 93 %.      "

## 2020-07-29 NOTE — SUBJECTIVE & OBJECTIVE
Past Medical History:   Diagnosis Date    Arthritis     Diabetes     Gout     HTN (hypertension)     Neuropathy     Obese        Past Surgical History:   Procedure Laterality Date    UMBILICAL HERNIA REPAIR         Review of patient's allergies indicates:  No Known Allergies    Current Facility-Administered Medications on File Prior to Encounter   Medication    hylan g-f 20 48 mg/6 mL injection 48 mg     Current Outpatient Medications on File Prior to Encounter   Medication Sig    atorvastatin (LIPITOR) 40 MG tablet atorvastatin 40 mg tablet   Take 1 tablet by mouth daily.    baclofen (LIORESAL) 10 MG tablet Take 1 tablet (10 mg total) by mouth 3 (three) times daily.    ergocalciferol (ERGOCALCIFEROL) 50,000 unit Cap Take 1 capsule (50,000 Units total) by mouth every 7 days.    furosemide (LASIX) 40 MG tablet furosemide 40 mg tablet   Take 1 tablet by mouth daily.    gabapentin (NEURONTIN) 300 MG capsule Take 1 capsule (300 mg total) by mouth 3 (three) times daily.    losartan-hydrochlorothiazide 50-12.5 mg (HYZAAR) 50-12.5 mg per tablet Take 1 tablet by mouth once daily.    metformin (GLUCOPHAGE) 1000 MG tablet Take 1,000 mg by mouth 2 (two) times daily with meals.    ondansetron (ZOFRAN) 4 MG tablet Take 1 tablet (4 mg total) by mouth every 6 (six) hours.    oxyCODONE (OXYCONTIN) 10 mg 12 hr tablet Take 1 tablet (10 mg total) by mouth every 12 (twelve) hours as needed for Pain.    PREVNAR 13, PF, 0.5 mL Syrg     pulse oximeter (PULSE OXIMETER) device Use twice daily at 8 AM and 3 PM and record the value in MyChart as directed.    pulse oximeter (PULSE OXIMETER) device by Apply Externally route 2 (two) times a day. Use twice daily at 8 AM and 3 PM and record the value in MyChart as directed.    sulfamethoxazole-trimethoprim 800-160mg (BACTRIM DS) 800-160 mg Tab Take 1 tablet by mouth 2 (two) times daily. for 7 days    tobramycin sulfate 0.3% (TOBREX) 0.3 % ophthalmic solution tobramycin 0.3 %  eye drops     Family History     Problem Relation (Age of Onset)    Diabetes Sister    Hypertension Sister        Tobacco Use    Smoking status: Never Smoker    Smokeless tobacco: Never Used   Substance and Sexual Activity    Alcohol use: No    Drug use: No    Sexual activity: Not Currently     Partners: Male     Birth control/protection: None     Review of Systems   Constitutional: Positive for appetite change, chills, fatigue and fever.   HENT: Negative for congestion, ear discharge, ear pain, mouth sores, nosebleeds, sinus pain, sneezing, sore throat, tinnitus and trouble swallowing.    Eyes: Negative for pain, discharge, redness and itching.   Respiratory: Positive for cough, chest tightness, shortness of breath and wheezing.    Cardiovascular: Positive for leg swelling. Negative for chest pain and palpitations.   Gastrointestinal: Negative for abdominal distention, abdominal pain, blood in stool, constipation, diarrhea, nausea and vomiting.   Endocrine: Negative for cold intolerance, heat intolerance, polydipsia, polyphagia and polyuria.   Genitourinary: Positive for difficulty urinating and frequency. Negative for dyspareunia, dysuria, enuresis, flank pain, genital sores, hematuria, menstrual problem, pelvic pain, urgency, vaginal bleeding, vaginal discharge and vaginal pain.   Musculoskeletal: Positive for myalgias. Negative for arthralgias, back pain, joint swelling and neck pain.   Skin: Negative for pallor and rash.   Neurological: Negative for dizziness, weakness, light-headedness, numbness and headaches.   Hematological: Negative for adenopathy. Does not bruise/bleed easily.   Psychiatric/Behavioral: Negative for confusion and sleep disturbance. The patient is not nervous/anxious.    All other systems reviewed and are negative.    Objective:     Vital Signs (Most Recent):  Temp: 98.7 °F (37.1 °C) (07/28/20 2130)  Pulse: 82 (07/28/20 2130)  Resp: (!) 24 (07/28/20 2130)  BP: (!) 109/53 (07/28/20  2000)  SpO2: 97 % (07/28/20 2130) Vital Signs (24h Range):  Temp:  [98.7 °F (37.1 °C)-98.9 °F (37.2 °C)] 98.7 °F (37.1 °C)  Pulse:  [74-82] 82  Resp:  [18-24] 24  SpO2:  [91 %-98 %] 97 %  BP: ()/(51-65) 109/53     Weight: 93.2 kg (205 lb 8 oz)  Body mass index is 34.2 kg/m².    Physical Exam  Vitals signs and nursing note reviewed.   Constitutional:       Appearance: She is well-developed.   HENT:      Head: Normocephalic and atraumatic.      Right Ear: External ear normal.      Left Ear: External ear normal.      Nose: Nose normal.   Eyes:      Conjunctiva/sclera: Conjunctivae normal.      Pupils: Pupils are equal, round, and reactive to light.   Neck:      Musculoskeletal: Normal range of motion and neck supple.      Thyroid: No thyromegaly.      Vascular: No JVD.   Cardiovascular:      Rate and Rhythm: Normal rate and regular rhythm.      Heart sounds: Normal heart sounds. No murmur. No friction rub. No gallop.    Pulmonary:      Effort: Pulmonary effort is normal. No respiratory distress.      Breath sounds: No stridor. Wheezing and rales present.   Chest:      Chest wall: No tenderness.   Abdominal:      General: Bowel sounds are normal. There is no distension.      Palpations: Abdomen is soft. There is no mass.      Tenderness: There is no abdominal tenderness. There is no guarding or rebound.   Genitourinary:     Vagina: Normal. No vaginal discharge.   Musculoskeletal: Normal range of motion.         General: No deformity.      Right lower leg: Edema present.      Left lower leg: Edema present.   Lymphadenopathy:      Cervical: No cervical adenopathy.   Skin:     General: Skin is warm.      Capillary Refill: Capillary refill takes less than 2 seconds.      Findings: No erythema or rash.   Neurological:      Mental Status: She is alert and oriented to person, place, and time.      Cranial Nerves: No cranial nerve deficit.      Sensory: No sensory deficit.      Deep Tendon Reflexes: Reflexes normal.    Psychiatric:         Behavior: Behavior normal.           CRANIAL NERVES     CN III, IV, VI   Pupils are equal, round, and reactive to light.       Significant Labs: All pertinent labs within the past 24 hours have been reviewed.    Significant Imaging: I have reviewed and interpreted all pertinent imaging results/findings within the past 24 hours.

## 2020-07-29 NOTE — PLAN OF CARE
Spoke with Anna at [facility] regarding long term care for the patient.  They may have a bed for her.  Discussed patient is Covid +.  She is checking with her supervisors as to if they would take patient SNF to long term care.

## 2020-07-29 NOTE — ASSESSMENT & PLAN NOTE
Possible Pre Renal from poor oral intake vs Intrinsic 2/2 sepsis 2/2 COVID   IVF  Monitor   Avoid nephrotoxic meds

## 2020-07-29 NOTE — ED NOTES
Patient resting in bed. able to make needs known. No acute distress noted. Cart in low position, side rails up x 2 and call bell in reach

## 2020-07-29 NOTE — HOSPITAL COURSE
7/29 pt was seen and examined at bedside  . She is aao x 2 in nad . She is pleasant confuse . She follow commands . She is  Complaining of SOB .  The case was discussed with Her grandson William Talley iv ( He Voice is the POA, Meera number [phone])  7/30 Pt was seen and examined at bedside . She Is more alert  Today . She is on 2 lt by nc . The Case was D/w William Talley iv ( He Voice is the POA, Meera number [phone]) and REFUSED  REMDESIVIR Tx .   7/31 Pt was seen and examined at bedside . She is aao x 2  In nad . She has a very poor appetite . She is on 2 lt by nasal canula   8/1 Pt was seen and examined at bedside . She is aao x 3 in nad . She has some confusion on and off . There e was no acute event overnight . She is on 3 lt  By nc   8/2 pt is aao x 2 in nad . The CO2 is 13 . TheCBG has been < than 250 , There is No  Significant anion gap . We will don  A ABG and d./c the D5 1/2 ns . She has some  sinus bradycardia on and off  .   8/3 - Patient Evaluated by Cards recommending no intervention related to Bradycardia. Patient bouts asymptomatic. No events - Plan for SNF in progress.  Patient accepted at SNF. Patient seen and examined on date of discharge

## 2020-07-29 NOTE — ED NOTES
Pt resting in bed. Bed in lowest position. Side rails up x 2. Pt is alert. Pt has coarse lung sounds bilaterally. Pt has audible and active bowel sounds x 4. Pt has +3 edema to lower extremities. Pt skin is ashy and flaky on bilateral feet and ankles. Pt has brown color on bilateral ankles and feet. Pt

## 2020-07-29 NOTE — H&P
[facility] Hospital Medicine  History & Physical    Patient Name: Dilia Talley  MRN: [id number]  Admission Date: 7/28/2020  Attending Physician: Jeff Porter MD  Primary Care Provider: Leif Townsend MD         Patient information was obtained from patient and ER records.     Subjective:     Principal Problem:Pneumonia due to COVID-19 virus    Chief Complaint:   Chief Complaint   Patient presents with    Shortness of Breath     covid +. on home 02        HPI: Mrs. Talley is a 74 yo F with Hx of HTN, DM, HLD, who presents for evaluation of SOB. Patient is COVID+ and has been seen in the ED  4 times in the last two weeks for similar complaints. She is on home oxygen. Today she reports worsening of her SOB, associated with fever, chills, and dry cough. She was unable to take care of herself and seeked help. In ED her labs were noted to have new onset SAMMI with low BP and CXR with worsening infiltration. Patient is admitted for further management.                Past Medical History:   Diagnosis Date    Arthritis     Diabetes     Gout     HTN (hypertension)     Neuropathy     Obese        Past Surgical History:   Procedure Laterality Date    UMBILICAL HERNIA REPAIR         Review of patient's allergies indicates:  No Known Allergies    Current Facility-Administered Medications on File Prior to Encounter   Medication    hylan g-f 20 48 mg/6 mL injection 48 mg     Current Outpatient Medications on File Prior to Encounter   Medication Sig    atorvastatin (LIPITOR) 40 MG tablet atorvastatin 40 mg tablet   Take 1 tablet by mouth daily.    baclofen (LIORESAL) 10 MG tablet Take 1 tablet (10 mg total) by mouth 3 (three) times daily.    ergocalciferol (ERGOCALCIFEROL) 50,000 unit Cap Take 1 capsule (50,000 Units total) by mouth every 7 days.    furosemide (LASIX) 40 MG tablet furosemide 40 mg tablet   Take 1 tablet by mouth daily.    gabapentin (NEURONTIN) 300 MG capsule Take 1 capsule (300 mg  total) by mouth 3 (three) times daily.    losartan-hydrochlorothiazide 50-12.5 mg (HYZAAR) 50-12.5 mg per tablet Take 1 tablet by mouth once daily.    metformin (GLUCOPHAGE) 1000 MG tablet Take 1,000 mg by mouth 2 (two) times daily with meals.    ondansetron (ZOFRAN) 4 MG tablet Take 1 tablet (4 mg total) by mouth every 6 (six) hours.    oxyCODONE (OXYCONTIN) 10 mg 12 hr tablet Take 1 tablet (10 mg total) by mouth every 12 (twelve) hours as needed for Pain.    PREVNAR 13, PF, 0.5 mL Syrg     pulse oximeter (PULSE OXIMETER) device Use twice daily at 8 AM and 3 PM and record the value in MyChart as directed.    pulse oximeter (PULSE OXIMETER) device by Apply Externally route 2 (two) times a day. Use twice daily at 8 AM and 3 PM and record the value in MyChart as directed.    sulfamethoxazole-trimethoprim 800-160mg (BACTRIM DS) 800-160 mg Tab Take 1 tablet by mouth 2 (two) times daily. for 7 days    tobramycin sulfate 0.3% (TOBREX) 0.3 % ophthalmic solution tobramycin 0.3 % eye drops     Family History     Problem Relation (Age of Onset)    Diabetes Sister    Hypertension Sister        Tobacco Use    Smoking status: Never Smoker    Smokeless tobacco: Never Used   Substance and Sexual Activity    Alcohol use: No    Drug use: No    Sexual activity: Not Currently     Partners: Male     Birth control/protection: None     Review of Systems   Constitutional: Positive for appetite change, chills, fatigue and fever.   HENT: Negative for congestion, ear discharge, ear pain, mouth sores, nosebleeds, sinus pain, sneezing, sore throat, tinnitus and trouble swallowing.    Eyes: Negative for pain, discharge, redness and itching.   Respiratory: Positive for cough, chest tightness, shortness of breath and wheezing.    Cardiovascular: Positive for leg swelling. Negative for chest pain and palpitations.   Gastrointestinal: Negative for abdominal distention, abdominal pain, blood in stool, constipation, diarrhea, nausea and  vomiting.   Endocrine: Negative for cold intolerance, heat intolerance, polydipsia, polyphagia and polyuria.   Genitourinary: Positive for difficulty urinating and frequency. Negative for dyspareunia, dysuria, enuresis, flank pain, genital sores, hematuria, menstrual problem, pelvic pain, urgency, vaginal bleeding, vaginal discharge and vaginal pain.   Musculoskeletal: Positive for myalgias. Negative for arthralgias, back pain, joint swelling and neck pain.   Skin: Negative for pallor and rash.   Neurological: Negative for dizziness, weakness, light-headedness, numbness and headaches.   Hematological: Negative for adenopathy. Does not bruise/bleed easily.   Psychiatric/Behavioral: Negative for confusion and sleep disturbance. The patient is not nervous/anxious.    All other systems reviewed and are negative.    Objective:     Vital Signs (Most Recent):  Temp: 98.7 °F (37.1 °C) (07/28/20 2130)  Pulse: 82 (07/28/20 2130)  Resp: (!) 24 (07/28/20 2130)  BP: (!) 109/53 (07/28/20 2000)  SpO2: 97 % (07/28/20 2130) Vital Signs (24h Range):  Temp:  [98.7 °F (37.1 °C)-98.9 °F (37.2 °C)] 98.7 °F (37.1 °C)  Pulse:  [74-82] 82  Resp:  [18-24] 24  SpO2:  [91 %-98 %] 97 %  BP: ()/(51-65) 109/53     Weight: 93.2 kg (205 lb 8 oz)  Body mass index is 34.2 kg/m².    Physical Exam  Vitals signs and nursing note reviewed.   Constitutional:       Appearance: She is well-developed.   HENT:      Head: Normocephalic and atraumatic.      Right Ear: External ear normal.      Left Ear: External ear normal.      Nose: Nose normal.   Eyes:      Conjunctiva/sclera: Conjunctivae normal.      Pupils: Pupils are equal, round, and reactive to light.   Neck:      Musculoskeletal: Normal range of motion and neck supple.      Thyroid: No thyromegaly.      Vascular: No JVD.   Cardiovascular:      Rate and Rhythm: Normal rate and regular rhythm.      Heart sounds: Normal heart sounds. No murmur. No friction rub. No gallop.    Pulmonary:      Effort:  Pulmonary effort is normal. No respiratory distress.      Breath sounds: No stridor. Wheezing and rales present.   Chest:      Chest wall: No tenderness.   Abdominal:      General: Bowel sounds are normal. There is no distension.      Palpations: Abdomen is soft. There is no mass.      Tenderness: There is no abdominal tenderness. There is no guarding or rebound.   Genitourinary:     Vagina: Normal. No vaginal discharge.   Musculoskeletal: Normal range of motion.         General: No deformity.      Right lower leg: Edema present.      Left lower leg: Edema present.   Lymphadenopathy:      Cervical: No cervical adenopathy.   Skin:     General: Skin is warm.      Capillary Refill: Capillary refill takes less than 2 seconds.      Findings: No erythema or rash.   Neurological:      Mental Status: She is alert and oriented to person, place, and time.      Cranial Nerves: No cranial nerve deficit.      Sensory: No sensory deficit.      Deep Tendon Reflexes: Reflexes normal.   Psychiatric:         Behavior: Behavior normal.           CRANIAL NERVES     CN III, IV, VI   Pupils are equal, round, and reactive to light.       Significant Labs: All pertinent labs within the past 24 hours have been reviewed.    Significant Imaging: I have reviewed and interpreted all pertinent imaging results/findings within the past 24 hours.    Assessment/Plan:     * Pneumonia due to COVID-19 virus  - COVID-19 testing   - Infection Control notified     - Isolation:   - Airborne, Contact and Droplet Precautions  - Cohort patients into COVID units  - N95 mask, wear eye protection  - 20 second hand hygiene              - Limit visitors per hospital policy              - Consolidating lab draws, nursing care, provider visits, and interventions    - Diagnostics: (leukopenia, hyponatremia, hyperferritinemia, elevated troponin, elevated d-dimer, age, and comorbidities are significant predictors of poor clinical outcome)  CBC, CMP, Ferritin, CRP, LDH,  BNP, Troponin and Portable CXR    - Management:  Supplemental O2 to maintain SpO2 >92%  Continuous/intermittent Pulse Ox  Albuterol treatment PRN  Methylprednisolone, Ascorbic Acid, Thiamine   Doxycycline and Ceftriaxone  Tomorrow Pharmacy Consult for Remdesivir                  SAMMI (acute kidney injury)  Possible Pre Renal from poor oral intake vs Intrinsic 2/2 sepsis 2/2 COVID   IVF  Monitor   Avoid nephrotoxic meds       Severe sepsis  S/p IVF, Cultures and ABX   Monitor       Diabetes mellitus type 2 with neurological manifestations  Monitor F/S   ISS  Diabetic Diet         VTE Risk Mitigation (From admission, onward)    None             Jeff Porter MD  Department of Hospital Medicine   [facility]

## 2020-07-29 NOTE — ASSESSMENT & PLAN NOTE
Hba1c 6  ISS  Diabetic Diet   She look dehydrated . We will start D51/2 ns . She does not have a good appetite

## 2020-07-29 NOTE — PLAN OF CARE
Anna from [facility] states they will consider taking the patient.  Requested information be sent.  Also would like to speak with the son to discuss financial arrangement for long term placement.  Spoke with the son and gave phone number to call Anna.     Referral sent via [email or web address].    May request repeat Covid test when ready for transfer.       07/29/20 1471   Post-Acute Status   Post-Acute Authorization Placement   Post-Acute Placement Status Referrals Sent

## 2020-07-29 NOTE — ED NOTES
Patient resting in bed. No acute distress noted. Cart in low position, side rails up x 2 and call bell in reach

## 2020-07-29 NOTE — ASSESSMENT & PLAN NOTE
- COVID-19 testing   - Infection Control notified     - Isolation:   - Airborne, Contact and Droplet Precautions  - Cohort patients into COVID units  - N95 mask, wear eye protection  - 20 second hand hygiene              - Limit visitors per hospital policy              - Consolidating lab draws, nursing care, provider visits, and interventions    - Diagnostics: (leukopenia, hyponatremia, hyperferritinemia, elevated troponin, elevated d-dimer, age, and comorbidities are significant predictors of poor clinical outcome)  CBC, CMP, Ferritin, CRP, LDH, BNP, Troponin and Portable CXR    - Management:  Supplemental O2 to maintain SpO2 >92%  Continuous/intermittent Pulse Ox  Albuterol treatment PRN  Methylprednisolone, Ascorbic Acid, Thiamine   Doxycycline and Ceftriaxone  Tomorrow Pharmacy Consult for Remdesivir

## 2020-07-30 LAB
ALBUMIN SERPL BCP-MCNC: 2.1 G/DL (ref 3.5–5.2)
ALP SERPL-CCNC: 56 U/L (ref 55–135)
ALT SERPL W/O P-5'-P-CCNC: 13 U/L (ref 10–44)
ANION GAP SERPL CALC-SCNC: 9 MMOL/L (ref 8–16)
AST SERPL-CCNC: 25 U/L (ref 10–40)
BACTERIA #/AREA URNS HPF: ABNORMAL /HPF
BASOPHILS # BLD AUTO: 0.01 K/UL (ref 0–0.2)
BASOPHILS NFR BLD: 0.2 % (ref 0–1.9)
BILIRUB SERPL-MCNC: 0.2 MG/DL (ref 0.1–1)
BILIRUB UR QL STRIP: NEGATIVE
BUN SERPL-MCNC: 26 MG/DL (ref 8–23)
CALCIUM SERPL-MCNC: 9.5 MG/DL (ref 8.7–10.5)
CHLORIDE SERPL-SCNC: 111 MMOL/L (ref 95–110)
CLARITY UR: CLEAR
CO2 SERPL-SCNC: 19 MMOL/L (ref 23–29)
COLOR UR: YELLOW
CREAT SERPL-MCNC: 1.3 MG/DL (ref 0.5–1.4)
DIFFERENTIAL METHOD: ABNORMAL
EOSINOPHIL # BLD AUTO: 0 K/UL (ref 0–0.5)
EOSINOPHIL NFR BLD: 0 % (ref 0–8)
ERYTHROCYTE [DISTWIDTH] IN BLOOD BY AUTOMATED COUNT: 14.3 % (ref 11.5–14.5)
EST. GFR  (AFRICAN AMERICAN): 46 ML/MIN/1.73 M^2
EST. GFR  (NON AFRICAN AMERICAN): 40 ML/MIN/1.73 M^2
GLUCOSE SERPL-MCNC: 175 MG/DL (ref 70–110)
GLUCOSE UR QL STRIP: NEGATIVE
HCT VFR BLD AUTO: 31.9 % (ref 37–48.5)
HGB BLD-MCNC: 10.3 G/DL (ref 12–16)
HGB UR QL STRIP: ABNORMAL
IMM GRANULOCYTES # BLD AUTO: 0.32 K/UL (ref 0–0.04)
IMM GRANULOCYTES NFR BLD AUTO: 4.9 % (ref 0–0.5)
KETONES UR QL STRIP: ABNORMAL
LEUKOCYTE ESTERASE UR QL STRIP: NEGATIVE
LYMPHOCYTES # BLD AUTO: 0.8 K/UL (ref 1–4.8)
LYMPHOCYTES NFR BLD: 12.6 % (ref 18–48)
MAGNESIUM SERPL-MCNC: 1.5 MG/DL (ref 1.6–2.6)
MCH RBC QN AUTO: 28.4 PG (ref 27–31)
MCHC RBC AUTO-ENTMCNC: 32.3 G/DL (ref 32–36)
MCV RBC AUTO: 88 FL (ref 82–98)
MICROSCOPIC COMMENT: ABNORMAL
MONOCYTES # BLD AUTO: 0.6 K/UL (ref 0.3–1)
MONOCYTES NFR BLD: 8.4 % (ref 4–15)
NEUTROPHILS # BLD AUTO: 4.8 K/UL (ref 1.8–7.7)
NEUTROPHILS NFR BLD: 73.9 % (ref 38–73)
NITRITE UR QL STRIP: NEGATIVE
NRBC BLD-RTO: 0 /100 WBC
PH UR STRIP: 6 [PH] (ref 5–8)
PLATELET # BLD AUTO: 388 K/UL (ref 150–350)
PMV BLD AUTO: 9.9 FL (ref 9.2–12.9)
POCT GLUCOSE: 138 MG/DL (ref 70–110)
POCT GLUCOSE: 144 MG/DL (ref 70–110)
POCT GLUCOSE: 172 MG/DL (ref 70–110)
POCT GLUCOSE: 210 MG/DL (ref 70–110)
POTASSIUM SERPL-SCNC: 3.6 MMOL/L (ref 3.5–5.1)
PROT SERPL-MCNC: 6.7 G/DL (ref 6–8.4)
PROT UR QL STRIP: ABNORMAL
RBC # BLD AUTO: 3.63 M/UL (ref 4–5.4)
RBC #/AREA URNS HPF: 5 /HPF (ref 0–4)
SODIUM SERPL-SCNC: 139 MMOL/L (ref 136–145)
SP GR UR STRIP: 1.02 (ref 1–1.03)
SQUAMOUS #/AREA URNS HPF: 3 /HPF
URN SPEC COLLECT METH UR: ABNORMAL
UROBILINOGEN UR STRIP-ACNC: 1 EU/DL
WBC # BLD AUTO: 6.53 K/UL (ref 3.9–12.7)

## 2020-07-30 PROCEDURE — 94761 N-INVAS EAR/PLS OXIMETRY MLT: CPT | Mod: HCWC

## 2020-07-30 PROCEDURE — 25000003 PHARM REV CODE 250: Mod: HCWC | Performed by: INTERNAL MEDICINE

## 2020-07-30 PROCEDURE — 80053 COMPREHEN METABOLIC PANEL: CPT | Mod: HCWC

## 2020-07-30 PROCEDURE — 27000221 HC OXYGEN, UP TO 24 HOURS: Mod: HCWC

## 2020-07-30 PROCEDURE — 63600175 PHARM REV CODE 636 W HCPCS: Mod: HCWC | Performed by: INTERNAL MEDICINE

## 2020-07-30 PROCEDURE — 97530 THERAPEUTIC ACTIVITIES: CPT | Mod: HCWC

## 2020-07-30 PROCEDURE — 25000003 PHARM REV CODE 250: Mod: HCWC | Performed by: HOSPITALIST

## 2020-07-30 PROCEDURE — S5010 5% DEXTROSE AND 0.45% SALINE: HCPCS | Mod: HCWC | Performed by: INTERNAL MEDICINE

## 2020-07-30 PROCEDURE — 85025 COMPLETE CBC W/AUTO DIFF WBC: CPT | Mod: HCWC

## 2020-07-30 PROCEDURE — 97166 OT EVAL MOD COMPLEX 45 MIN: CPT | Mod: HCWC

## 2020-07-30 PROCEDURE — 21400001 HC TELEMETRY ROOM: Mod: HCWC

## 2020-07-30 PROCEDURE — 97116 GAIT TRAINING THERAPY: CPT | Mod: HCWC

## 2020-07-30 PROCEDURE — 36415 COLL VENOUS BLD VENIPUNCTURE: CPT | Mod: HCWC

## 2020-07-30 PROCEDURE — 81000 URINALYSIS NONAUTO W/SCOPE: CPT | Mod: HCWC

## 2020-07-30 PROCEDURE — 83735 ASSAY OF MAGNESIUM: CPT | Mod: HCWC

## 2020-07-30 RX ORDER — ONDANSETRON 2 MG/ML
4 INJECTION INTRAMUSCULAR; INTRAVENOUS EVERY 6 HOURS PRN
Status: DISCONTINUED | OUTPATIENT
Start: 2020-07-30 | End: 2020-08-04 | Stop reason: HOSPADM

## 2020-07-30 RX ORDER — HYDROXYZINE PAMOATE 25 MG/1
25 CAPSULE ORAL EVERY 8 HOURS PRN
Status: DISCONTINUED | OUTPATIENT
Start: 2020-07-30 | End: 2020-08-04 | Stop reason: HOSPADM

## 2020-07-30 RX ORDER — ACETAMINOPHEN 325 MG/1
650 TABLET ORAL EVERY 6 HOURS PRN
Status: DISCONTINUED | OUTPATIENT
Start: 2020-07-30 | End: 2020-08-04 | Stop reason: HOSPADM

## 2020-07-30 RX ADMIN — DEXTROSE MONOHYDRATE AND SODIUM CHLORIDE: 5; .45 INJECTION, SOLUTION INTRAVENOUS at 05:07

## 2020-07-30 RX ADMIN — Medication 100 MG: at 08:07

## 2020-07-30 RX ADMIN — INSULIN ASPART 4 UNITS: 100 INJECTION, SOLUTION INTRAVENOUS; SUBCUTANEOUS at 05:07

## 2020-07-30 RX ADMIN — ONDANSETRON 4 MG: 2 INJECTION INTRAMUSCULAR; INTRAVENOUS at 12:07

## 2020-07-30 RX ADMIN — DOXYCYCLINE HYCLATE 100 MG: 100 TABLET, COATED ORAL at 08:07

## 2020-07-30 RX ADMIN — CEFTRIAXONE 1 G: 1 INJECTION, SOLUTION INTRAVENOUS at 11:07

## 2020-07-30 RX ADMIN — DEXAMETHASONE 6 MG: 4 TABLET ORAL at 11:07

## 2020-07-30 RX ADMIN — HYDROXYZINE PAMOATE 25 MG: 25 CAPSULE ORAL at 08:07

## 2020-07-30 RX ADMIN — OXYCODONE HYDROCHLORIDE AND ACETAMINOPHEN 500 MG: 500 TABLET ORAL at 08:07

## 2020-07-30 RX ADMIN — ZINC SULFATE 220 MG (50 MG) CAPSULE 220 MG: CAPSULE at 08:07

## 2020-07-30 RX ADMIN — INSULIN ASPART 2 UNITS: 100 INJECTION, SOLUTION INTRAVENOUS; SUBCUTANEOUS at 11:07

## 2020-07-30 RX ADMIN — ATORVASTATIN CALCIUM 40 MG: 40 TABLET, FILM COATED ORAL at 08:07

## 2020-07-30 NOTE — SUBJECTIVE & OBJECTIVE
Interval History:     Review of Systems   Unable to perform ROS: Acuity of condition   Constitutional: Positive for appetite change.   HENT: Negative.    Eyes: Negative.    Respiratory: Positive for cough.    Gastrointestinal: Positive for nausea.   Endocrine: Negative.    Genitourinary: Negative.    Musculoskeletal: Negative.    Skin: Negative.    Allergic/Immunologic: Negative.    Psychiatric/Behavioral: Positive for decreased concentration.     Objective:     Vital Signs (Most Recent):  Temp: 97.9 °F (36.6 °C) (07/30/20 1205)  Pulse: 71 (07/30/20 1300)  Resp: (!) 22 (07/30/20 1205)  BP: (!) 105/58 (07/30/20 1205)  SpO2: (!) 93 % (07/30/20 1205) Vital Signs (24h Range):  Temp:  [97.3 °F (36.3 °C)-97.9 °F (36.6 °C)] 97.9 °F (36.6 °C)  Pulse:  [59-84] 71  Resp:  [18-22] 22  SpO2:  [91 %-94 %] 93 %  BP: (103-128)/(54-67) 105/58     Weight: 90 kg (198 lb 6.6 oz)  Body mass index is 33.02 kg/m².    Intake/Output Summary (Last 24 hours) at 7/30/2020 1451  Last data filed at 7/30/2020 0550  Gross per 24 hour   Intake 1652 ml   Output 950 ml   Net 702 ml      Physical Exam  Vitals signs and nursing note reviewed.   Constitutional:       Appearance: She is well-developed.   HENT:      Head: Normocephalic and atraumatic.      Right Ear: External ear normal.      Left Ear: External ear normal.      Nose: Nose normal.   Eyes:      Conjunctiva/sclera: Conjunctivae normal.      Pupils: Pupils are equal, round, and reactive to light.   Neck:      Musculoskeletal: Normal range of motion and neck supple.      Thyroid: No thyromegaly.      Vascular: No JVD.   Cardiovascular:      Rate and Rhythm: Normal rate and regular rhythm.      Heart sounds: Normal heart sounds. No murmur. No friction rub. No gallop.    Pulmonary:      Effort: Pulmonary effort is normal. No respiratory distress.      Breath sounds: No stridor. No wheezing or rales.   Chest:      Chest wall: No tenderness.   Abdominal:      General: Bowel sounds are normal.  There is no distension.      Palpations: Abdomen is soft. There is no mass.      Tenderness: There is no abdominal tenderness. There is no guarding or rebound.   Genitourinary:     Vagina: Normal. No vaginal discharge.   Musculoskeletal: Normal range of motion.         General: Swelling present. No deformity.   Lymphadenopathy:      Cervical: No cervical adenopathy.   Skin:     General: Skin is warm.      Capillary Refill: Capillary refill takes less than 2 seconds.      Findings: No erythema or rash.   Neurological:      Mental Status: She is alert and oriented to person, place, and time.      Cranial Nerves: No cranial nerve deficit.      Sensory: No sensory deficit.      Deep Tendon Reflexes: Reflexes normal.   Psychiatric:         Behavior: Behavior normal.         Significant Labs: All pertinent labs within the past 24 hours have been reviewed.    Significant Imaging: I have reviewed all pertinent imaging results/findings within the past 24 hours.

## 2020-07-30 NOTE — PT/OT/SLP PROGRESS
Occupational Therapy  EVALUATION    Name: Dilia Talley  MRN: [id number]  Admitting Diagnosis:  Pneumonia due to COVID-19 virus       Recommendations:     Discharge Recommendations: nursing facility, skilled  Discharge Equipment Recommendations:  (TBD)  Barriers to discharge:  None    Assessment:     Dilia Talley is a 73 y.o. female with a medical diagnosis of Pneumonia due to COVID-19 virus.  She presents with DEBILITY AND GENERALIZED WEAKNESS. Performance deficits affecting function are gait instability, weakness, impaired balance, impaired endurance, impaired self care skills, impaired cognition, impaired functional mobilty.     Rehab Prognosis:  Good; patient would benefit from acute skilled OT services to address these deficits and reach maximum level of function.       Plan:     Patient to be seen 3 x/week to address the above listed problems via self-care/home management, therapeutic activities, therapeutic exercises  · Plan of Care Expires: 08/06/20  · Plan of Care Reviewed with: patient    Subjective     Chief Complaint: DEBILITY AND GENERALIZED WEAKNESS  Patient/Family Comments/goals:     Occupational Profile:  Living Environment: POOR HISTORIAN.  PT REPORTS LIVES WITH 2 SONS BUT UNABLE TO RELAY TO STAFF WHAT TYPE HOUSE   Previous level of function: (I) WITH ADL'S AND FUNCTIONAL MOBILITY PER PT REPORT  Roles and Routines: OCCUPATIONAL THERAPY  Equipment Used at Home:  oxygen, bedside commode, rollator, glucometer(O2 AS NEEDED)  Assistance upon Discharge:    Pain/Comfort:  · Pain Rating 1: 3/10  · Location - Side 1: Bilateral  · Location 1: leg    Objective:     Communicated with: NURSE AND Epic CHART REVIEW prior to session.  Patient found supine with   upon OT entry to room.  PT POOR HISTORIAN AND CONFUSED AT TIME PT A O X1-2 BUT UNABLE TO VERBALIZED COMPLEX QUESTIONING    General Precautions: Standard, airborne, droplet, fall, contact   Orthopedic Precautions:N/A   Braces: N/A     Occupational Performance:      Bed Mobility:    · Patient completed Rolling/Turning to Left with  moderate assistance  · Patient completed Scooting/Bridging with moderate assistance  · Patient completed Supine to Sit with moderate assistance     Functional Mobility/Transfers:  · Patient completed Sit <> Stand Transfer with moderate assistance  with  rolling walker   · Patient completed Bed <> Chair Transfer using Step Transfer technique with moderate assistance with rolling walker  · Functional Mobility: PT AMBULATED 20 FEET WITH MOD A WITH ROLLING WALKER AND VC 'S TO ADHERE TO TASK ,.    Activities of Daily Living:  · Upper Body Dressing: maximal assistance [facility] 6 Click ADL: 13    Treatment & Education:    PT SEEN IN ROOM AND REQ MAX A WITH BED MOBILITY AND MOD A WITH FORWARD SCOOTING EOB. PT AMBULATED WITHIN ROOM APPROX 20 FEET WITH ROLLING WALKER SLOW PACE WITH VC SAFETY AWARENESS AND POSTURE . PT REQ MOD A WITH BED SIDE CHAIR T/F'S PT EDUCATED ON B UE ROM EXERCISE FOR HEP. PT VERBALIZED UNDERSTANDING AND RETURNED DEMONSTRATION.  Patient left up in chair with all lines intact, call button in reach, chair alarm on and NURSE notifiedEducation:      GOALS:   Multidisciplinary Problems     Occupational Therapy Goals        Problem: Occupational Therapy Goal    Goal Priority Disciplines Outcome Interventions   Occupational Therapy Goal     OT, PT/OT     Description: OT GOALS TO BE MET BY 8-6-20  MIN A WITH UE DRESSING  PT WILL TOLERATE 1 SET X 15 REPS B UE ROM EXERCISE  S WITH BSC T/F'S                       Time Tracking:     OT Date of Treatment: 07/30/20  OT Start Time: 1300  OT Stop Time: 1325  OT Total Time (min): 25 min    Billable Minutes:Evaluation 10 MINUTES  Therapeutic Activity 15 MINUTES    Cass Dumont OT  7/30/2020

## 2020-07-30 NOTE — ASSESSMENT & PLAN NOTE
- COVID-19 testing   - Infection Control notified     - Isolation:   - Airborne, Contact and Droplet Precautions  - Cohort patients into COVID units  - N95 mask, wear eye protection  - 20 second hand hygiene              - Limit visitors per hospital policy              - Consolidating lab draws, nursing care, provider visits, and interventions    - Diagnostics: (leukopenia, hyponatremia, hyperferritinemia, elevated troponin, elevated d-dimer, age, and comorbidities are significant predictors of poor clinical outcome)  CBC, CMP, Ferritin, CRP, LDH, BNP, Troponin and Portable CXR    - Management:  Supplemental O2 to maintain SpO2 >92%  Continuous/intermittent Pulse Ox  Albuterol treatment PRN    -Cont zinc , vit c  -Start decadron  -The case was discussed with Her grandson William Talley elmer ( He Voice is the POA, Meera number [phone]). He Refused Remdesevir Tx   -Doxycycline and Ceftriaxone

## 2020-07-30 NOTE — PLAN OF CARE
Awake and alert, oriented to self only  Sitter at bedside  Moves in bed independently  Swallows pills whole  poc reviewed wih pt, but she requires frequent redirection  Continue Covid isolation  Accuchecks ac/hs with sliding scale coverage  D5 1/2NS @ 75/ml hr  Rocephin QD  Pop catheter  Softt dental diabetic diet  3.5 L NC; sats in low 90's- she does require reminders to leave O2 on.  Discharge planning is home with HH vs SNF  Urine sent this am

## 2020-07-30 NOTE — PLAN OF CARE
Patient accepted by [facility].  PT recommending SNF placement.  OT note pending.  Patient is not medically cleared for transfer yet.  Per Dr. Austin could be ready in 24 - 48 hours.       07/30/20 0950   Post-Acute Status   Post-Acute Authorization Placement   Post-Acute Placement Status Awaiting Internal Medical Clearance     Spoke with harsha Stone and ARIS.  He received the paperwork from Donato Age to apply for Medicaid long term care as patient will transfer to long term care after SNF.  Recommended all paperwork to be completed and returned to [facility] as soon as possible.  Advised Mr. Talley that PT recommending SNF and [employer] authorization would be submitted when all information available.  CM will keep him or his Uncle, Sukhjinder Talley informed of the progress of the transfer to [facility].

## 2020-07-30 NOTE — PLAN OF CARE
FERNANDO called in and PASRR faxed to [facility].    Receieved 142.    PASRR and 142 sent via [facility] to [facility].

## 2020-07-30 NOTE — PT/OT/SLP PROGRESS
Physical Therapy  Treatment    Dilia Talley   MRN: [id number]   Admitting Diagnosis: Pneumonia due to COVID-19 virus    PT Received On: 07/30/20  PT Start Time: 1255     PT Stop Time: 1320    PT Total Time (min): 25 min       Billable Minutes:  Gait Training 15 and Therapeutic Activity 10    Treatment Type: Treatment  PT/PTA: PT     PTA Visit Number: 0       General Precautions: Standard, airborne, fall, droplet, contact  Orthopedic Precautions: N/A   Braces: N/A         Subjective:  Communicated with NURSE PATTERSON AND Epic CHART REVIEW  prior to session.  PT AGREED TO TX     Pain/Comfort  Pain Rating 1: 3/10  Location - Side 1: Bilateral  Location 1: leg  Pain Addressed 1: Reposition  Pain Rating Post-Intervention 1: 3/10    Objective:   Patient found with: peripheral IV, telemetry, oxygen    Functional Mobility:  PT MET IN RM SUP IN BED. PT LOG ROLLED TO LEFT AND SEATED EOB WITH MAX A. PT SCOOTED TO EOB AND STOOD WITH RW AND MOD A. PT GT TRAINED X 18' WITH SLOW PACE SHUFFLING GT WITH O2 IN TOW. PT GIVEN CUES FOR SAFETY WITH RW USE. PT RETURNED TO CHAIR SIDE T/F WITH MOD A. PT SEATED FOR REST. PT EDUCATED ON TE AND COMPLETED 10 REPS OF MIP, TKE, AND AP. PT LEFT SEATED IN CHAIR WITH CALL WAHL IN REACH AND EDUCATED TO CALL FOR ASSIST PT REPORTED UNDERSTANDING.      AM-PAC 6 CLICK MOBILITY  How much help from another person does this patient currently need?   1 = Unable, Total/Dependent Assistance  2 = A lot, Maximum/Moderate Assistance  3 = A little, Minimum/Contact Guard/Supervision  4 = None, Modified [place]/Independent    Turning over in bed (including adjusting bedclothes, sheets and blankets)?: 2  Sitting down on and standing up from a chair with arms (e.g., wheelchair, bedside commode, etc.): 2  Moving from lying on back to sitting on the side of the bed?: 2  Moving to and from a bed to a chair (including a wheelchair)?: 2  Need to walk in hospital room?: 2  Climbing 3-5 steps with a railing?: 1  Basic  Mobility Total Score: 11    AM-PAC Raw Score CMS G-Code Modifier Level of Impairment Assistance   6 % Total / Unable   7 - 9 CM 80 - 100% Maximal Assist   10 - 14 CL 60 - 80% Moderate Assist   15 - 19 CK 40 - 60% Moderate Assist   20 - 22 CJ 20 - 40% Minimal Assist   23 CI 1-20% SBA / CGA   24 CH 0% Independent/ Mod I     Patient left up in chair with all lines intact, call button in reach and chair alarm on.    Assessment:  PT PROGRESSING WITH GT TRAINING HOWEVER FATIGUES QUICKLY.     Rehab identified problem list/impairments: Rehab identified problem list/impairments: weakness, impaired endurance, impaired self care skills, gait instability, impaired balance, pain, decreased safety awareness, decreased lower extremity function, impaired functional mobilty, decreased upper extremity function    Rehab potential is good.    Activity tolerance: Fair    Discharge recommendations: Discharge Facility/Level of Care Needs: nursing facility, skilled     Barriers to discharge:      Equipment recommendations: Equipment Needed After Discharge: (TBD)     GOALS:   Multidisciplinary Problems     Physical Therapy Goals        Problem: Physical Therapy Goal    Goal Priority Disciplines Outcome Goal Variances Interventions   Physical Therapy Goal     PT, PT/OT Ongoing, Progressing     Description: 1. Patient will perform supine to/from sit cga  2. Patient will perform sit to/from stand with RW cga  3. Patient will ambulate 50ft RW min a                   PLAN:    Patient to be seen 5 x/week  to address the above listed problems via gait training, therapeutic activities, therapeutic exercises  Plan of Care expires: 08/05/20  Plan of Care reviewed with: patient         Molly Al, PT  07/30/2020

## 2020-07-30 NOTE — PLAN OF CARE
Problem: Diabetes Comorbidity  Goal: Blood Glucose Level Within Desired Range  Outcome: Ongoing, Progressing   Avasys at bedside  Pt intermittently confused with time, place and situation  PT/OT following pt  CM following pt for SNF vs HH  IV ABX administered per MD order  D5 with 0.45% NS @ 50mL/hr per MD order  3.5L NC, oxygen saturation at 91%, NADN, NO SOB  Safety precautions followed and maintained

## 2020-07-30 NOTE — PROGRESS NOTES
[facility] Hospital Medicine  Progress Note    Patient Name: Dilia Talley  MRN: [id number]  Patient Class: IP- Inpatient   Admission Date: 7/28/2020  Length of Stay: 2 days  Attending Physician: Carlton Del Angel, *  Primary Care Provider: Leif Townsend MD        Subjective:     Principal Problem:Pneumonia due to COVID-19 virus        HPI:  Mrs. Talley is a 72 yo F with Hx of HTN, DM, HLD, who presents for evaluation of SOB. Patient is COVID+ and has been seen in the ED  4 times in the last two weeks for similar complaints. She is on home oxygen. Today she reports worsening of her SOB, associated with fever, chills, and dry cough. She was unable to take care of herself and seeked help. In ED her labs were noted to have new onset SAMMI with low BP and CXR with worsening infiltration. Patient is admitted for further management.                  Overview/Hospital Course:  7/29 pt was seen and examined at bedside  . She is aao x 2 in nad . She is pleasant confuse . She follow commands . She is  Complaining of SOB .  The case was discussed with Her grandson William Talley iv ( He Voice is the POA, Meera number [phone])  7/30 Pt was seen and examined at bedside . She Is more alert  Today . She is on 2 lt by nc . The Case was D/w William Talley iv ( He Voice is the POA, Meera number [phone]) and REFUSED  REMDESIVIR Tx .     Interval History:     Review of Systems   Unable to perform ROS: Acuity of condition   Constitutional: Positive for appetite change.   HENT: Negative.    Eyes: Negative.    Respiratory: Positive for cough.    Gastrointestinal: Positive for nausea.   Endocrine: Negative.    Genitourinary: Negative.    Musculoskeletal: Negative.    Skin: Negative.    Allergic/Immunologic: Negative.    Psychiatric/Behavioral: Positive for decreased concentration.     Objective:     Vital Signs (Most Recent):  Temp: 97.9 °F (36.6 °C) (07/30/20 1205)  Pulse: 71 (07/30/20 1300)  Resp: (!) 22 (07/30/20  1205)  BP: (!) 105/58 (07/30/20 1205)  SpO2: (!) 93 % (07/30/20 1205) Vital Signs (24h Range):  Temp:  [97.3 °F (36.3 °C)-97.9 °F (36.6 °C)] 97.9 °F (36.6 °C)  Pulse:  [59-84] 71  Resp:  [18-22] 22  SpO2:  [91 %-94 %] 93 %  BP: (103-128)/(54-67) 105/58     Weight: 90 kg (198 lb 6.6 oz)  Body mass index is 33.02 kg/m².    Intake/Output Summary (Last 24 hours) at 7/30/2020 1451  Last data filed at 7/30/2020 0550  Gross per 24 hour   Intake 1652 ml   Output 950 ml   Net 702 ml      Physical Exam  Vitals signs and nursing note reviewed.   Constitutional:       Appearance: She is well-developed.   HENT:      Head: Normocephalic and atraumatic.      Right Ear: External ear normal.      Left Ear: External ear normal.      Nose: Nose normal.   Eyes:      Conjunctiva/sclera: Conjunctivae normal.      Pupils: Pupils are equal, round, and reactive to light.   Neck:      Musculoskeletal: Normal range of motion and neck supple.      Thyroid: No thyromegaly.      Vascular: No JVD.   Cardiovascular:      Rate and Rhythm: Normal rate and regular rhythm.      Heart sounds: Normal heart sounds. No murmur. No friction rub. No gallop.    Pulmonary:      Effort: Pulmonary effort is normal. No respiratory distress.      Breath sounds: No stridor. No wheezing or rales.   Chest:      Chest wall: No tenderness.   Abdominal:      General: Bowel sounds are normal. There is no distension.      Palpations: Abdomen is soft. There is no mass.      Tenderness: There is no abdominal tenderness. There is no guarding or rebound.   Genitourinary:     Vagina: Normal. No vaginal discharge.   Musculoskeletal: Normal range of motion.         General: Swelling present. No deformity.   Lymphadenopathy:      Cervical: No cervical adenopathy.   Skin:     General: Skin is warm.      Capillary Refill: Capillary refill takes less than 2 seconds.      Findings: No erythema or rash.   Neurological:      Mental Status: She is alert and oriented to person, place,  and time.      Cranial Nerves: No cranial nerve deficit.      Sensory: No sensory deficit.      Deep Tendon Reflexes: Reflexes normal.   Psychiatric:         Behavior: Behavior normal.         Significant Labs: All pertinent labs within the past 24 hours have been reviewed.    Significant Imaging: I have reviewed all pertinent imaging results/findings within the past 24 hours.      Assessment/Plan:      * Pneumonia due to COVID-19 virus  - COVID-19 testing   - Infection Control notified     - Isolation:   - Airborne, Contact and Droplet Precautions  - Cohort patients into COVID units  - N95 mask, wear eye protection  - 20 second hand hygiene              - Limit visitors per hospital policy              - Consolidating lab draws, nursing care, provider visits, and interventions    - Diagnostics: (leukopenia, hyponatremia, hyperferritinemia, elevated troponin, elevated d-dimer, age, and comorbidities are significant predictors of poor clinical outcome)  CBC, CMP, Ferritin, CRP, LDH, BNP, Troponin and Portable CXR    - Management:  Supplemental O2 to maintain SpO2 >92%  Continuous/intermittent Pulse Ox  Albuterol treatment PRN    -Cont zinc , vit c  -Start decadron  -The case was discussed with Her grandson William Talley iv ( He Voice is the POA, Meera number [phone]). He Refused Remdesevir Tx   -Doxycycline and Ceftriaxone                     SAMMI (acute kidney injury)  Possible Pre Renal from poor oral intake vs Intrinsic 2/2 COVID   IVF  Monitor   Avoid nephrotoxic meds       Severe sepsis  S/p IVF, Cultures and ABX   Monitor       Diabetes mellitus type 2 with neurological manifestations  Hba1c 6  ISS  Diabetic Diet   Cont  D51/2 ns . She does not have a good appetite          VTE Risk Mitigation (From admission, onward)    None                Carlton Del Angel MD  Department of Hospital Medicine   [facility]

## 2020-07-31 LAB
ALBUMIN SERPL BCP-MCNC: 2.1 G/DL (ref 3.5–5.2)
ALP SERPL-CCNC: 55 U/L (ref 55–135)
ALT SERPL W/O P-5'-P-CCNC: 15 U/L (ref 10–44)
ANION GAP SERPL CALC-SCNC: 10 MMOL/L (ref 8–16)
AST SERPL-CCNC: 24 U/L (ref 10–40)
BASOPHILS # BLD AUTO: 0.01 K/UL (ref 0–0.2)
BASOPHILS NFR BLD: 0.1 % (ref 0–1.9)
BILIRUB SERPL-MCNC: 0.2 MG/DL (ref 0.1–1)
BUN SERPL-MCNC: 24 MG/DL (ref 8–23)
CALCIUM SERPL-MCNC: 9.2 MG/DL (ref 8.7–10.5)
CHLORIDE SERPL-SCNC: 112 MMOL/L (ref 95–110)
CO2 SERPL-SCNC: 18 MMOL/L (ref 23–29)
CREAT SERPL-MCNC: 1.3 MG/DL (ref 0.5–1.4)
CRP SERPL-MCNC: 7.4 MG/L (ref 0–8.2)
DIFFERENTIAL METHOD: ABNORMAL
EOSINOPHIL # BLD AUTO: 0 K/UL (ref 0–0.5)
EOSINOPHIL NFR BLD: 0 % (ref 0–8)
ERYTHROCYTE [DISTWIDTH] IN BLOOD BY AUTOMATED COUNT: 14.2 % (ref 11.5–14.5)
EST. GFR  (AFRICAN AMERICAN): 46 ML/MIN/1.73 M^2
EST. GFR  (NON AFRICAN AMERICAN): 40 ML/MIN/1.73 M^2
FERRITIN SERPL-MCNC: 118 NG/ML (ref 20–300)
GLUCOSE SERPL-MCNC: 121 MG/DL (ref 70–110)
HCT VFR BLD AUTO: 31.6 % (ref 37–48.5)
HGB BLD-MCNC: 10.2 G/DL (ref 12–16)
IMM GRANULOCYTES # BLD AUTO: 0.12 K/UL (ref 0–0.04)
IMM GRANULOCYTES NFR BLD AUTO: 1.7 % (ref 0–0.5)
LYMPHOCYTES # BLD AUTO: 1.2 K/UL (ref 1–4.8)
LYMPHOCYTES NFR BLD: 17.1 % (ref 18–48)
MCH RBC QN AUTO: 28.3 PG (ref 27–31)
MCHC RBC AUTO-ENTMCNC: 32.3 G/DL (ref 32–36)
MCV RBC AUTO: 88 FL (ref 82–98)
MONOCYTES # BLD AUTO: 0.6 K/UL (ref 0.3–1)
MONOCYTES NFR BLD: 9 % (ref 4–15)
NEUTROPHILS # BLD AUTO: 5.1 K/UL (ref 1.8–7.7)
NEUTROPHILS NFR BLD: 72.1 % (ref 38–73)
NRBC BLD-RTO: 0 /100 WBC
PLATELET # BLD AUTO: 435 K/UL (ref 150–350)
PMV BLD AUTO: 9.7 FL (ref 9.2–12.9)
POCT GLUCOSE: 114 MG/DL (ref 70–110)
POCT GLUCOSE: 147 MG/DL (ref 70–110)
POCT GLUCOSE: 151 MG/DL (ref 70–110)
POCT GLUCOSE: 175 MG/DL (ref 70–110)
POTASSIUM SERPL-SCNC: 3.7 MMOL/L (ref 3.5–5.1)
PROT SERPL-MCNC: 6.2 G/DL (ref 6–8.4)
RBC # BLD AUTO: 3.6 M/UL (ref 4–5.4)
SODIUM SERPL-SCNC: 140 MMOL/L (ref 136–145)
WBC # BLD AUTO: 7.08 K/UL (ref 3.9–12.7)

## 2020-07-31 PROCEDURE — 82728 ASSAY OF FERRITIN: CPT | Mod: HCWC

## 2020-07-31 PROCEDURE — 36415 COLL VENOUS BLD VENIPUNCTURE: CPT | Mod: HCWC

## 2020-07-31 PROCEDURE — 27000221 HC OXYGEN, UP TO 24 HOURS: Mod: HCWC

## 2020-07-31 PROCEDURE — 86140 C-REACTIVE PROTEIN: CPT | Mod: HCWC

## 2020-07-31 PROCEDURE — 85025 COMPLETE CBC W/AUTO DIFF WBC: CPT | Mod: HCWC

## 2020-07-31 PROCEDURE — S5010 5% DEXTROSE AND 0.45% SALINE: HCPCS | Mod: HCWC | Performed by: INTERNAL MEDICINE

## 2020-07-31 PROCEDURE — 21400001 HC TELEMETRY ROOM: Mod: HCWC

## 2020-07-31 PROCEDURE — 25000003 PHARM REV CODE 250: Mod: HCWC | Performed by: INTERNAL MEDICINE

## 2020-07-31 PROCEDURE — 80053 COMPREHEN METABOLIC PANEL: CPT | Mod: HCWC

## 2020-07-31 PROCEDURE — 99900035 HC TECH TIME PER 15 MIN (STAT): Mod: HCWC

## 2020-07-31 PROCEDURE — 63600175 PHARM REV CODE 636 W HCPCS: Mod: HCWC | Performed by: INTERNAL MEDICINE

## 2020-07-31 PROCEDURE — 94761 N-INVAS EAR/PLS OXIMETRY MLT: CPT | Mod: HCWC

## 2020-07-31 RX ADMIN — DOXYCYCLINE HYCLATE 100 MG: 100 TABLET, COATED ORAL at 08:07

## 2020-07-31 RX ADMIN — DEXTROSE MONOHYDRATE AND SODIUM CHLORIDE: 5; .45 INJECTION, SOLUTION INTRAVENOUS at 02:07

## 2020-07-31 RX ADMIN — Medication 100 MG: at 08:07

## 2020-07-31 RX ADMIN — ATORVASTATIN CALCIUM 40 MG: 40 TABLET, FILM COATED ORAL at 08:07

## 2020-07-31 RX ADMIN — DEXAMETHASONE 6 MG: 4 TABLET ORAL at 08:07

## 2020-07-31 RX ADMIN — OXYCODONE HYDROCHLORIDE AND ACETAMINOPHEN 500 MG: 500 TABLET ORAL at 08:07

## 2020-07-31 RX ADMIN — ZINC SULFATE 220 MG (50 MG) CAPSULE 220 MG: CAPSULE at 09:07

## 2020-07-31 NOTE — SUBJECTIVE & OBJECTIVE
Interval History:     Review of Systems   Constitutional: Positive for appetite change.   HENT: Negative.    Eyes: Negative.    Respiratory: Positive for cough.    Gastrointestinal: Positive for nausea.   Endocrine: Negative.    Genitourinary: Negative.    Musculoskeletal: Negative.    Skin: Negative.    Allergic/Immunologic: Negative.    Psychiatric/Behavioral: Positive for decreased concentration.     Objective:     Vital Signs (Most Recent):  Temp: 96.7 °F (35.9 °C) (07/31/20 1112)  Pulse: 61 (07/31/20 1112)  Resp: 19 (07/31/20 1112)  BP: 122/64 (07/31/20 1112)  SpO2: 96 % (07/31/20 1112) Vital Signs (24h Range):  Temp:  [96.7 °F (35.9 °C)-98.1 °F (36.7 °C)] 96.7 °F (35.9 °C)  Pulse:  [49-76] 61  Resp:  [18-20] 19  SpO2:  [90 %-99 %] 96 %  BP: (112-129)/(54-66) 122/64     Weight: 90.7 kg (199 lb 15.3 oz)  Body mass index is 33.27 kg/m².    Intake/Output Summary (Last 24 hours) at 7/31/2020 1352  Last data filed at 7/31/2020 0400  Gross per 24 hour   Intake 1494.67 ml   Output 351 ml   Net 1143.67 ml      Physical Exam  Vitals signs and nursing note reviewed.   Constitutional:       Appearance: She is well-developed.   HENT:      Head: Normocephalic and atraumatic.      Right Ear: External ear normal.      Left Ear: External ear normal.      Nose: Nose normal.   Eyes:      Conjunctiva/sclera: Conjunctivae normal.      Pupils: Pupils are equal, round, and reactive to light.   Neck:      Musculoskeletal: Normal range of motion and neck supple.      Thyroid: No thyromegaly.      Vascular: No JVD.   Cardiovascular:      Rate and Rhythm: Normal rate and regular rhythm.      Heart sounds: Normal heart sounds. No murmur. No friction rub. No gallop.    Pulmonary:      Effort: Pulmonary effort is normal. No respiratory distress.      Breath sounds: No stridor. No wheezing or rales.   Chest:      Chest wall: No tenderness.   Abdominal:      General: Bowel sounds are normal. There is no distension.      Palpations: Abdomen  is soft. There is no mass.      Tenderness: There is no abdominal tenderness. There is no guarding or rebound.   Genitourinary:     Vagina: Normal. No vaginal discharge.   Musculoskeletal: Normal range of motion.         General: Swelling present. No deformity.   Lymphadenopathy:      Cervical: No cervical adenopathy.   Skin:     General: Skin is warm.      Capillary Refill: Capillary refill takes less than 2 seconds.      Findings: No erythema or rash.   Neurological:      Mental Status: She is alert and oriented to person, place, and time.      Cranial Nerves: No cranial nerve deficit.      Sensory: No sensory deficit.      Deep Tendon Reflexes: Reflexes normal.   Psychiatric:         Behavior: Behavior normal.         Significant Labs: All pertinent labs within the past 24 hours have been reviewed.    Significant Imaging: I have reviewed all pertinent imaging results/findings within the past 24 hours.

## 2020-07-31 NOTE — PLAN OF CARE
"Plan of care reviewed with patient, pt unable to verbalize understanding.  Pt remains free from falls this shift, fall precautions in place.bed alarm set.   Pt remains free from skin breakdown, turn q2hr orders in.  AAOx3, disoriented to situation,NAD noted at this time.  /66 (BP Location: Left arm, Patient Position: Lying)   Pulse (!) 55   Temp 98.1 °F (36.7 °C) (Oral)   Resp 18   Ht 5' 5" (1.651 m)   Wt 90 kg (198 lb 6.6 oz)   SpO2 99%   Breastfeeding No   BMI 33.02 kg/m²    PIV 20 G to R hand, D5 1/2 NS @50mL/hr.  Pt remained afebrile.  3.5L O2 via NC  NSR on the tele monitor  Blood glucose monitoring AC/HS.  Pt admitted for PNA due to COVID. Pop catheter in place for retention.  Pt currently resting comfortably in bed. Poss d/c to SNF in am.  Hourly rounding complete. Bed in lowest position, side rails up, call light in reach.  Sitter remains at the bedside due to intermittent confusion.  Will continue to monitor.    Problem: Fall Injury Risk  Goal: Absence of Fall and Fall-Related Injury  Outcome: Ongoing, Progressing     Problem: Adult Inpatient Plan of Care  Goal: Plan of Care Review  Outcome: Ongoing, Progressing  Goal: Patient-Specific Goal (Individualization)  Outcome: Ongoing, Progressing  Goal: Absence of Hospital-Acquired Illness or Injury  Outcome: Ongoing, Progressing  Goal: Optimal Comfort and Wellbeing  Outcome: Ongoing, Progressing  Goal: Readiness for Transition of Care  Outcome: Ongoing, Progressing  Goal: Rounds/Family Conference  Outcome: Ongoing, Progressing     Problem: Infection  Goal: Infection Symptom Resolution  Outcome: Ongoing, Progressing     Problem: Diabetes Comorbidity  Goal: Blood Glucose Level Within Desired Range  Outcome: Ongoing, Progressing     Problem: Adjustment to Illness (Sepsis/Septic Shock)  Goal: Optimal Coping  Outcome: Ongoing, Progressing     Problem: Bleeding (Sepsis/Septic Shock)  Goal: Absence of Bleeding  Outcome: Ongoing, Progressing     Problem: " Glycemic Control Impaired (Sepsis/Septic Shock)  Goal: Blood Glucose Level Within Desired Range  Outcome: Ongoing, Progressing     Problem: Hemodynamic Instability (Sepsis/Septic Shock)  Goal: Effective Tissue Perfusion  Outcome: Ongoing, Progressing     Problem: Infection (Sepsis/Septic Shock)  Goal: Absence of Infection Signs/Symptoms  Outcome: Ongoing, Progressing     Problem: Nutrition Impaired (Sepsis/Septic Shock)  Goal: Optimal Nutrition Intake  Outcome: Ongoing, Progressing     Problem: Respiratory Compromise (Sepsis/Septic Shock)  Goal: Effective Oxygenation and Ventilation  Outcome: Ongoing, Progressing     Problem: Electrolyte Imbalance (Acute Kidney Injury/Impairment)  Goal: Serum Electrolyte Balance  Outcome: Ongoing, Progressing     Problem: Fluid Imbalance (Acute Kidney Injury/Impairment)  Goal: Optimal Fluid Balance  Outcome: Ongoing, Progressing     Problem: Hematologic Alteration (Acute Kidney Injury/Impairment)  Goal: Hemoglobin, Hematocrit and Platelets Within Normal Range  Outcome: Ongoing, Progressing     Problem: Oral Intake Inadequate (Acute Kidney Injury/Impairment)  Goal: Optimal Nutrition Intake  Outcome: Ongoing, Progressing     Problem: Renal Function Impairment (Acute Kidney Injury/Impairment)  Goal: Effective Renal Function  Outcome: Ongoing, Progressing     Problem: Skin Injury Risk Increased  Goal: Skin Health and Integrity  Outcome: Ongoing, Progressing

## 2020-07-31 NOTE — PLAN OF CARE
Sent OT note to [employer] via [facility].  Anna, liaison, is out today.  Spoke with QUIANA Zarate.  He will submit for auth to [facility] today.  Elliot requested repeat covid testing.  Discussed with Dr. Austin who want to understand the reason.  Spoke to Elliot he wanted to know if patient needed to go to [facility].  Reminded she would need 2 negatives.  Elliot agreed to take the patient without repeat test.       07/31/20 1039   Post-Acute Status   Post-Acute Authorization Placement   Post-Acute Placement Status Pending Payor Medical Review

## 2020-07-31 NOTE — PT/OT/SLP PROGRESS
Physical Therapy      Patient Name:  Dilia Talley   MRN:  [id number]    PT attempted tx 12:05pm, pt refused despite encouragement. PT will attempt tx at later time/ date in order to continue with POC.     Klarissa Ledezma, PT/OT   7/31/2020

## 2020-07-31 NOTE — PT/OT/SLP PROGRESS
Occupational Therapy      Patient Name:  Dilia Talley   MRN:  [id number]    Patient not seen today secondary to pt refused despite max encouragement. Will follow-up on later date    Cass Dumont OT  7/31/2020   1226

## 2020-07-31 NOTE — PLAN OF CARE
Attempted to call William HURST, no answer.  Spoke to Sukhjinder Talley, son.  Updated on transfer to [facility] when [facility] received [facility].      Per request of Dr. Austin, asked again if family still does not want Remdesivir.  Mr. Talley adamantly declined Remdesivir for his mother.  He states he has reseached the use and is concerned about the side effects.  Advised Dr. Austin.

## 2020-07-31 NOTE — ASSESSMENT & PLAN NOTE
- COVID-19 testing   - Infection Control notified     - Isolation:   - Airborne, Contact and Droplet Precautions  - Cohort patients into COVID units  - N95 mask, wear eye protection  - 20 second hand hygiene              - Limit visitors per hospital policy              - Consolidating lab draws, nursing care, provider visits, and interventions    - Diagnostics: (leukopenia, hyponatremia, hyperferritinemia, elevated troponin, elevated d-dimer, age, and comorbidities are significant predictors of poor clinical outcome)  CBC, CMP, Ferritin, CRP, LDH, BNP, Troponin and Portable CXR    - Management:  Supplemental O2 to maintain SpO2 >92%  Continuous/intermittent Pulse Ox  Albuterol treatment PRN    -Cont zinc , vit c  -Start decadron  -The case was discussed with Her grandson William Talley elmer ( He Voice is the POA, Meera number [phone]). He Refused Remdesevir Tx   -Doxycycline and Ceftriaxone

## 2020-07-31 NOTE — PLAN OF CARE
POC reviewed with pt. Pt verbalizes understanding of POC. No questions at this time.  AAOx3. Disoriented to situation. NADN.  SB on cardiac monitor.  D5 in 1/2NS infusing at 50 ml/hr.  Pt remains free of falls.  No complaints at this time.  Safety measures in place. Will continue to monitor. Bed alarm on. Sitter at bedside.  Informed pt to call for assistance before getting up.

## 2020-07-31 NOTE — PROGRESS NOTES
[facility] Hospital Medicine  Progress Note    Patient Name: Dilia Talley  MRN: [id number]  Patient Class: IP- Inpatient   Admission Date: 7/28/2020  Length of Stay: 3 days  Attending Physician: Carlton Del Angel, *  Primary Care Provider: Leif Townsend MD        Subjective:     Principal Problem:Pneumonia due to COVID-19 virus        HPI:  Mrs. Talley is a 74 yo F with Hx of HTN, DM, HLD, who presents for evaluation of SOB. Patient is COVID+ and has been seen in the ED  4 times in the last two weeks for similar complaints. She is on home oxygen. Today she reports worsening of her SOB, associated with fever, chills, and dry cough. She was unable to take care of herself and seeked help. In ED her labs were noted to have new onset SAMMI with low BP and CXR with worsening infiltration. Patient is admitted for further management.                  Overview/Hospital Course:  7/29 pt was seen and examined at bedside  . She is aao x 2 in nad . She is pleasant confuse . She follow commands . She is  Complaining of SOB .  The case was discussed with Her grandson William Talley iv ( He Voice is the POA, Meera number [phone])  7/30 Pt was seen and examined at bedside . She Is more alert  Today . She is on 2 lt by nc . The Case was D/w William Talley iv ( He Voice is the POA, Meera number [phone]) and REFUSED  REMDESIVIR Tx .   7/31 Pt was seen and examined at bedside . She is aao x 2  In nad . She has a very poor appetite . She is on 2 lt by nasal canula     Interval History:     Review of Systems   Constitutional: Positive for appetite change.   HENT: Negative.    Eyes: Negative.    Respiratory: Positive for cough.    Gastrointestinal: Positive for nausea.   Endocrine: Negative.    Genitourinary: Negative.    Musculoskeletal: Negative.    Skin: Negative.    Allergic/Immunologic: Negative.    Psychiatric/Behavioral: Positive for decreased concentration.     Objective:     Vital Signs (Most Recent):  Temp:  96.7 °F (35.9 °C) (07/31/20 1112)  Pulse: 61 (07/31/20 1112)  Resp: 19 (07/31/20 1112)  BP: 122/64 (07/31/20 1112)  SpO2: 96 % (07/31/20 1112) Vital Signs (24h Range):  Temp:  [96.7 °F (35.9 °C)-98.1 °F (36.7 °C)] 96.7 °F (35.9 °C)  Pulse:  [49-76] 61  Resp:  [18-20] 19  SpO2:  [90 %-99 %] 96 %  BP: (112-129)/(54-66) 122/64     Weight: 90.7 kg (199 lb 15.3 oz)  Body mass index is 33.27 kg/m².    Intake/Output Summary (Last 24 hours) at 7/31/2020 1352  Last data filed at 7/31/2020 0400  Gross per 24 hour   Intake 1494.67 ml   Output 351 ml   Net 1143.67 ml      Physical Exam  Vitals signs and nursing note reviewed.   Constitutional:       Appearance: She is well-developed.   HENT:      Head: Normocephalic and atraumatic.      Right Ear: External ear normal.      Left Ear: External ear normal.      Nose: Nose normal.   Eyes:      Conjunctiva/sclera: Conjunctivae normal.      Pupils: Pupils are equal, round, and reactive to light.   Neck:      Musculoskeletal: Normal range of motion and neck supple.      Thyroid: No thyromegaly.      Vascular: No JVD.   Cardiovascular:      Rate and Rhythm: Normal rate and regular rhythm.      Heart sounds: Normal heart sounds. No murmur. No friction rub. No gallop.    Pulmonary:      Effort: Pulmonary effort is normal. No respiratory distress.      Breath sounds: No stridor. No wheezing or rales.   Chest:      Chest wall: No tenderness.   Abdominal:      General: Bowel sounds are normal. There is no distension.      Palpations: Abdomen is soft. There is no mass.      Tenderness: There is no abdominal tenderness. There is no guarding or rebound.   Genitourinary:     Vagina: Normal. No vaginal discharge.   Musculoskeletal: Normal range of motion.         General: Swelling present. No deformity.   Lymphadenopathy:      Cervical: No cervical adenopathy.   Skin:     General: Skin is warm.      Capillary Refill: Capillary refill takes less than 2 seconds.      Findings: No erythema or rash.    Neurological:      Mental Status: She is alert and oriented to person, place, and time.      Cranial Nerves: No cranial nerve deficit.      Sensory: No sensory deficit.      Deep Tendon Reflexes: Reflexes normal.   Psychiatric:         Behavior: Behavior normal.         Significant Labs: All pertinent labs within the past 24 hours have been reviewed.    Significant Imaging: I have reviewed all pertinent imaging results/findings within the past 24 hours.      Assessment/Plan:      * Pneumonia due to COVID-19 virus  - COVID-19 testing   - Infection Control notified     - Isolation:   - Airborne, Contact and Droplet Precautions  - Cohort patients into COVID units  - N95 mask, wear eye protection  - 20 second hand hygiene              - Limit visitors per hospital policy              - Consolidating lab draws, nursing care, provider visits, and interventions    - Diagnostics: (leukopenia, hyponatremia, hyperferritinemia, elevated troponin, elevated d-dimer, age, and comorbidities are significant predictors of poor clinical outcome)  CBC, CMP, Ferritin, CRP, LDH, BNP, Troponin and Portable CXR    - Management:  Supplemental O2 to maintain SpO2 >92%  Continuous/intermittent Pulse Ox  Albuterol treatment PRN    -Cont zinc , vit c  -Start decadron  -The case was discussed with Her grandson William Talley elmer ( He Voice is the POA, Meera number [phone]). He Refused Remdesevir Tx   -Doxycycline and Ceftriaxone                     SAMMI (acute kidney injury)  Possible Pre Renal from poor oral intake vs Intrinsic 2/2 COVID   IVF  Monitor   Avoid nephrotoxic meds       Severe sepsis  S/p IVF, Cultures and ABX   Monitor       Diabetes mellitus type 2 with neurological manifestations  Hba1c 6  ISS  Diabetic Diet   Cont  D51/2 ns . She does not have a good appetite          VTE Risk Mitigation (From admission, onward)    None                Carlton Del Angel MD  Department of Hospital Medicine   [facility]

## 2020-08-01 LAB
ALBUMIN SERPL BCP-MCNC: 2.1 G/DL (ref 3.5–5.2)
ALP SERPL-CCNC: 61 U/L (ref 55–135)
ALT SERPL W/O P-5'-P-CCNC: 15 U/L (ref 10–44)
ANION GAP SERPL CALC-SCNC: 10 MMOL/L (ref 8–16)
AST SERPL-CCNC: 19 U/L (ref 10–40)
BASOPHILS # BLD AUTO: 0.01 K/UL (ref 0–0.2)
BASOPHILS NFR BLD: 0.2 % (ref 0–1.9)
BILIRUB SERPL-MCNC: 0.3 MG/DL (ref 0.1–1)
BUN SERPL-MCNC: 19 MG/DL (ref 8–23)
CALCIUM SERPL-MCNC: 9.1 MG/DL (ref 8.7–10.5)
CHLORIDE SERPL-SCNC: 112 MMOL/L (ref 95–110)
CO2 SERPL-SCNC: 19 MMOL/L (ref 23–29)
CREAT SERPL-MCNC: 1.1 MG/DL (ref 0.5–1.4)
DIFFERENTIAL METHOD: ABNORMAL
EOSINOPHIL # BLD AUTO: 0 K/UL (ref 0–0.5)
EOSINOPHIL NFR BLD: 0 % (ref 0–8)
ERYTHROCYTE [DISTWIDTH] IN BLOOD BY AUTOMATED COUNT: 14.1 % (ref 11.5–14.5)
EST. GFR  (AFRICAN AMERICAN): 57 ML/MIN/1.73 M^2
EST. GFR  (NON AFRICAN AMERICAN): 49 ML/MIN/1.73 M^2
GLUCOSE SERPL-MCNC: 117 MG/DL (ref 70–110)
HCT VFR BLD AUTO: 32.7 % (ref 37–48.5)
HGB BLD-MCNC: 10.3 G/DL (ref 12–16)
IMM GRANULOCYTES # BLD AUTO: 0.11 K/UL (ref 0–0.04)
IMM GRANULOCYTES NFR BLD AUTO: 2 % (ref 0–0.5)
LYMPHOCYTES # BLD AUTO: 1.1 K/UL (ref 1–4.8)
LYMPHOCYTES NFR BLD: 19 % (ref 18–48)
MCH RBC QN AUTO: 27.9 PG (ref 27–31)
MCHC RBC AUTO-ENTMCNC: 31.5 G/DL (ref 32–36)
MCV RBC AUTO: 89 FL (ref 82–98)
MONOCYTES # BLD AUTO: 0.6 K/UL (ref 0.3–1)
MONOCYTES NFR BLD: 9.9 % (ref 4–15)
NEUTROPHILS # BLD AUTO: 3.9 K/UL (ref 1.8–7.7)
NEUTROPHILS NFR BLD: 68.9 % (ref 38–73)
NRBC BLD-RTO: 0 /100 WBC
PLATELET # BLD AUTO: 489 K/UL (ref 150–350)
PMV BLD AUTO: 9.7 FL (ref 9.2–12.9)
POCT GLUCOSE: 135 MG/DL (ref 70–110)
POCT GLUCOSE: 144 MG/DL (ref 70–110)
POCT GLUCOSE: 168 MG/DL (ref 70–110)
POTASSIUM SERPL-SCNC: 3.7 MMOL/L (ref 3.5–5.1)
PROT SERPL-MCNC: 6.5 G/DL (ref 6–8.4)
RBC # BLD AUTO: 3.69 M/UL (ref 4–5.4)
SODIUM SERPL-SCNC: 141 MMOL/L (ref 136–145)
WBC # BLD AUTO: 5.58 K/UL (ref 3.9–12.7)

## 2020-08-01 PROCEDURE — 97530 THERAPEUTIC ACTIVITIES: CPT | Mod: HCWC

## 2020-08-01 PROCEDURE — 99900035 HC TECH TIME PER 15 MIN (STAT): Mod: HCWC

## 2020-08-01 PROCEDURE — 94761 N-INVAS EAR/PLS OXIMETRY MLT: CPT | Mod: HCWC

## 2020-08-01 PROCEDURE — 85025 COMPLETE CBC W/AUTO DIFF WBC: CPT | Mod: HCWC

## 2020-08-01 PROCEDURE — 27000221 HC OXYGEN, UP TO 24 HOURS: Mod: HCWC

## 2020-08-01 PROCEDURE — 25000003 PHARM REV CODE 250: Mod: HCWC | Performed by: INTERNAL MEDICINE

## 2020-08-01 PROCEDURE — 63600175 PHARM REV CODE 636 W HCPCS: Mod: HCWC | Performed by: INTERNAL MEDICINE

## 2020-08-01 PROCEDURE — 36415 COLL VENOUS BLD VENIPUNCTURE: CPT | Mod: HCWC

## 2020-08-01 PROCEDURE — 97535 SELF CARE MNGMENT TRAINING: CPT | Mod: HCWC

## 2020-08-01 PROCEDURE — 97110 THERAPEUTIC EXERCISES: CPT | Mod: HCWC

## 2020-08-01 PROCEDURE — 80053 COMPREHEN METABOLIC PANEL: CPT | Mod: HCWC

## 2020-08-01 PROCEDURE — 21400001 HC TELEMETRY ROOM: Mod: HCWC

## 2020-08-01 RX ADMIN — Medication 100 MG: at 09:08

## 2020-08-01 RX ADMIN — OXYCODONE HYDROCHLORIDE AND ACETAMINOPHEN 500 MG: 500 TABLET ORAL at 09:08

## 2020-08-01 RX ADMIN — DEXAMETHASONE 6 MG: 4 TABLET ORAL at 09:08

## 2020-08-01 RX ADMIN — ATORVASTATIN CALCIUM 40 MG: 40 TABLET, FILM COATED ORAL at 09:08

## 2020-08-01 RX ADMIN — ZINC SULFATE 220 MG (50 MG) CAPSULE 220 MG: CAPSULE at 09:08

## 2020-08-01 NOTE — PLAN OF CARE
"Plan of care reviewed with patient, pt unable to verbalize understanding.  Pt remains free from falls this shift, fall precautions in place.bed alarm set.   Pt remains free from skin breakdown, turn q2hr orders in.  AAOx3, disoriented to situation,NAD noted at this time.  BP (!) 93/55 (BP Location: Left arm, Patient Position: Lying)   Pulse (!) 50   Temp 98.8 °F (37.1 °C) (Oral)   Resp 18   Ht 5' 5" (1.651 m)   Wt 90.7 kg (199 lb 15.3 oz)   SpO2 98%   Breastfeeding No   BMI 33.27 kg/m²   PIV 20 G to R hand, D5 1/2 NS @50mL/hr.  Pt remained afebrile.  3L O2 via NC  NSR on the tele monitor  Blood glucose monitoring AC/HS.  Pt admitted for PNA due to COVID. Pop catheter in place for retention.  Pt currently resting comfortably in bed. Poss d/c to SNF in am.  Hourly rounding complete. Bed in lowest position, side rails up, call light in reach.  Sitter remains at the bedside due to intermittent confusion.  Will continue to monitor.  Problem: Fall Injury Risk  Goal: Absence of Fall and Fall-Related Injury  Outcome: Ongoing, Progressing     Problem: Adult Inpatient Plan of Care  Goal: Plan of Care Review  Outcome: Ongoing, Progressing  Goal: Patient-Specific Goal (Individualization)  Outcome: Ongoing, Progressing  Goal: Absence of Hospital-Acquired Illness or Injury  Outcome: Ongoing, Progressing  Goal: Optimal Comfort and Wellbeing  Outcome: Ongoing, Progressing  Goal: Readiness for Transition of Care  Outcome: Ongoing, Progressing  Goal: Rounds/Family Conference  Outcome: Ongoing, Progressing     Problem: Infection  Goal: Infection Symptom Resolution  Outcome: Ongoing, Progressing     Problem: Diabetes Comorbidity  Goal: Blood Glucose Level Within Desired Range  Outcome: Ongoing, Progressing     Problem: Adjustment to Illness (Sepsis/Septic Shock)  Goal: Optimal Coping  Outcome: Ongoing, Progressing     Problem: Bleeding (Sepsis/Septic Shock)  Goal: Absence of Bleeding  Outcome: Ongoing, Progressing   "   Problem: Glycemic Control Impaired (Sepsis/Septic Shock)  Goal: Blood Glucose Level Within Desired Range  Outcome: Ongoing, Progressing     Problem: Hemodynamic Instability (Sepsis/Septic Shock)  Goal: Effective Tissue Perfusion  Outcome: Ongoing, Progressing     Problem: Infection (Sepsis/Septic Shock)  Goal: Absence of Infection Signs/Symptoms  Outcome: Ongoing, Progressing     Problem: Nutrition Impaired (Sepsis/Septic Shock)  Goal: Optimal Nutrition Intake  Outcome: Ongoing, Progressing     Problem: Respiratory Compromise (Sepsis/Septic Shock)  Goal: Effective Oxygenation and Ventilation  Outcome: Ongoing, Progressing     Problem: Electrolyte Imbalance (Acute Kidney Injury/Impairment)  Goal: Serum Electrolyte Balance  Outcome: Ongoing, Progressing     Problem: Fluid Imbalance (Acute Kidney Injury/Impairment)  Goal: Optimal Fluid Balance  Outcome: Ongoing, Progressing     Problem: Hematologic Alteration (Acute Kidney Injury/Impairment)  Goal: Hemoglobin, Hematocrit and Platelets Within Normal Range  Outcome: Ongoing, Progressing     Problem: Oral Intake Inadequate (Acute Kidney Injury/Impairment)  Goal: Optimal Nutrition Intake  Outcome: Ongoing, Progressing     Problem: Renal Function Impairment (Acute Kidney Injury/Impairment)  Goal: Effective Renal Function  Outcome: Ongoing, Progressing     Problem: Skin Injury Risk Increased  Goal: Skin Health and Integrity  Outcome: Ongoing, Progressing

## 2020-08-01 NOTE — PLAN OF CARE
Transferred to chair with mod A.  A little more confused today.  Needed frequent VC to stay on task.

## 2020-08-01 NOTE — PROGRESS NOTES
[facility] Hospital Medicine  Progress Note    Patient Name: Dilia Talley  MRN: [id number]  Patient Class: IP- Inpatient   Admission Date: 7/28/2020  Length of Stay: 4 days  Attending Physician: Carlton Del Angel, *  Primary Care Provider: Leif Townsend MD        Subjective:     Principal Problem:Pneumonia due to COVID-19 virus        HPI:  Mrs. Talley is a 74 yo F with Hx of HTN, DM, HLD, who presents for evaluation of SOB. Patient is COVID+ and has been seen in the ED  4 times in the last two weeks for similar complaints. She is on home oxygen. Today she reports worsening of her SOB, associated with fever, chills, and dry cough. She was unable to take care of herself and seeked help. In ED her labs were noted to have new onset SAMMI with low BP and CXR with worsening infiltration. Patient is admitted for further management.                  Overview/Hospital Course:  7/29 pt was seen and examined at bedside  . She is aao x 2 in nad . She is pleasant confuse . She follow commands . She is  Complaining of SOB .  The case was discussed with Her grandson William Talley iv ( He Voice is the POA, Meera number [phone])  7/30 Pt was seen and examined at bedside . She Is more alert  Today . She is on 2 lt by nc . The Case was D/w William Talley iv ( He Voice is the POA, Meera number [phone]) and REFUSED  REMDESIVIR Tx .   7/31 Pt was seen and examined at bedside . She is aao x 2  In nad . She has a very poor appetite . She is on 2 lt by nasal canula   8/1 Pt was seen and examined at bedside . She is aao x 3 in nad . She has some confusion on and off . There e was no acute event overnight . She is on 3 lt  By nc     Interval History:     Review of Systems   Constitutional: Positive for appetite change.   HENT: Negative.    Eyes: Negative.    Respiratory: Positive for cough.    Gastrointestinal: Positive for nausea.   Endocrine: Negative.    Genitourinary: Negative.    Musculoskeletal: Negative.    Skin:  Negative.    Allergic/Immunologic: Negative.    Psychiatric/Behavioral: Positive for decreased concentration.     Objective:     Vital Signs (Most Recent):  Temp: 97 °F (36.1 °C) (08/01/20 0921)  Pulse: (!) 56 (08/01/20 0921)  Resp: 18 (08/01/20 0921)  BP: 130/65 (08/01/20 0921)  SpO2: 96 % (08/01/20 0921) Vital Signs (24h Range):  Temp:  [96.7 °F (35.9 °C)-98.8 °F (37.1 °C)] 97 °F (36.1 °C)  Pulse:  [47-63] 56  Resp:  [18-20] 18  SpO2:  [94 %-98 %] 96 %  BP: ()/(52-68) 130/65     Weight: 91.6 kg (201 lb 15.1 oz)  Body mass index is 33.6 kg/m².    Intake/Output Summary (Last 24 hours) at 8/1/2020 1104  Last data filed at 8/1/2020 0400  Gross per 24 hour   Intake 1265 ml   Output 900 ml   Net 365 ml      Physical Exam  Vitals signs and nursing note reviewed.   Constitutional:       Appearance: She is well-developed.   HENT:      Head: Normocephalic and atraumatic.      Right Ear: External ear normal.      Left Ear: External ear normal.      Nose: Nose normal.   Eyes:      Conjunctiva/sclera: Conjunctivae normal.      Pupils: Pupils are equal, round, and reactive to light.   Neck:      Musculoskeletal: Normal range of motion and neck supple.      Thyroid: No thyromegaly.      Vascular: No JVD.   Cardiovascular:      Rate and Rhythm: Normal rate and regular rhythm.      Heart sounds: Normal heart sounds. No murmur. No friction rub. No gallop.    Pulmonary:      Effort: Pulmonary effort is normal. No respiratory distress.      Breath sounds: No stridor. No wheezing or rales.   Chest:      Chest wall: No tenderness.   Abdominal:      General: Bowel sounds are normal. There is no distension.      Palpations: Abdomen is soft. There is no mass.      Tenderness: There is no abdominal tenderness. There is no guarding or rebound.   Genitourinary:     Vagina: Normal. No vaginal discharge.   Musculoskeletal: Normal range of motion.         General: Swelling present. No deformity.   Lymphadenopathy:      Cervical: No  cervical adenopathy.   Skin:     General: Skin is warm.      Capillary Refill: Capillary refill takes less than 2 seconds.      Findings: No erythema or rash.   Neurological:      Mental Status: She is alert and oriented to person, place, and time.      Cranial Nerves: No cranial nerve deficit.      Sensory: No sensory deficit.      Deep Tendon Reflexes: Reflexes normal.   Psychiatric:         Behavior: Behavior normal.         Significant Labs: All pertinent labs within the past 24 hours have been reviewed.    Significant Imaging: I have reviewed all pertinent imaging results/findings within the past 24 hours.      Assessment/Plan:      * Pneumonia due to COVID-19 virus  - COVID-19 testing   - Infection Control notified     - Isolation:   - Airborne, Contact and Droplet Precautions  - Cohort patients into COVID units  - N95 mask, wear eye protection  - 20 second hand hygiene              - Limit visitors per hospital policy              - Consolidating lab draws, nursing care, provider visits, and interventions    - Diagnostics: (leukopenia, hyponatremia, hyperferritinemia, elevated troponin, elevated d-dimer, age, and comorbidities are significant predictors of poor clinical outcome)  CBC, CMP, Ferritin, CRP, LDH, BNP, Troponin and Portable CXR    - Management:  Supplemental O2 to maintain SpO2 >92%  Continuous/intermittent Pulse Ox  Albuterol treatment PRN    -Cont zinc , vit c  -cont  decadron  -The case was discussed with Her grandson William Talley iv ( He Voice is the POA, Meera number [phone]). He Refused Remdesevir Tx   -s/p Doxycycline and Ceftriaxone  -wean off o2   -SNF placement                      SAMMI (acute kidney injury)  Possible Pre Renal from poor oral intake vs Intrinsic 2/2 COVID   IVF  Monitor   Avoid nephrotoxic meds   Resolved       Severe sepsis  S/p IVF, Cultures and ABX   Monitor       Diabetes mellitus type 2 with neurological manifestations  Hba1c 6  ISS  Diabetic Diet   Cont  D51/2 ns  . She does not have a good appetite          VTE Risk Mitigation (From admission, onward)    None                Carlton Del Angel MD  Department of Hospital Medicine   [facility]

## 2020-08-01 NOTE — ASSESSMENT & PLAN NOTE
- COVID-19 testing   - Infection Control notified     - Isolation:   - Airborne, Contact and Droplet Precautions  - Cohort patients into COVID units  - N95 mask, wear eye protection  - 20 second hand hygiene              - Limit visitors per hospital policy              - Consolidating lab draws, nursing care, provider visits, and interventions    - Diagnostics: (leukopenia, hyponatremia, hyperferritinemia, elevated troponin, elevated d-dimer, age, and comorbidities are significant predictors of poor clinical outcome)  CBC, CMP, Ferritin, CRP, LDH, BNP, Troponin and Portable CXR    - Management:  Supplemental O2 to maintain SpO2 >92%  Continuous/intermittent Pulse Ox  Albuterol treatment PRN    -Cont zinc , vit c  -cont  decadron  -The case was discussed with Her grandson William Talley elmer ( He Voice is the POA, Meera number [phone]). He Refused Remdesevir Tx   -s/p Doxycycline and Ceftriaxone  -wean off o2   -SNF placement

## 2020-08-01 NOTE — PT/OT/SLP PROGRESS
Physical Therapy Treatment    Patient Name:  Dilia Talley   MRN:  [id number]    Recommendations:     Discharge Recommendations:  nursing facility, skilled   Discharge Equipment Recommendations: (TBD)   Barriers to discharge: Decreased caregiver support    Assessment:     Needed mod A with transfers and bed mobility. Slightly more confused today. To unsteady for gait today    Rehab Prognosis: Fair; patient would benefit from acute skilled PT services to address these deficits and reach maximum level of function.    Recent Surgery: * No surgery found *      Plan:     During this hospitalization, patient to be seen 5 x/week to address the identified rehab impairments via gait training, therapeutic activities, therapeutic exercises and progress toward the following goals:    · Plan of Care Expires:  08/05/20    Subjective     Chief Complaint: weakness  Patient/Family Comments/goals: improve strength  Pain/Comfort:  · Pain Rating 1: 0/10      Objective:     Communicated with nursing and EPIC prior to session.  Patient found supine with   upon PT entry to room.     General Precautions: Standard, airborne   Orthopedic Precautions:N/A   Braces:       Functional Mobility:  · Bed Mobility:     · Supine to Sit: moderate assistance and of 2 persons  · Transfers:     · Bed to Chair: modified independence and of 2 persons with  no AD  using  Stand Pivot  · Gait: NT      AM-PAC 6 CLICK MOBILITY  Turning over in bed (including adjusting bedclothes, sheets and blankets)?: 2  Sitting down on and standing up from a chair with arms (e.g., wheelchair, bedside commode, etc.): 2  Moving from lying on back to sitting on the side of the bed?: 2  Moving to and from a bed to a chair (including a wheelchair)?: 2  Need to walk in hospital room?: 2  Climbing 3-5 steps with a railing?: 1  Basic Mobility Total Score: 11       Therapeutic Activities and Exercises   Supine to sit with mod X2 for upper and lower trunk and vc to stay on task.  Transfer  to chair with mod A. Slow but steady with transfer. Performed seated LE exercises 10 reps each LAQ, marching and seated heel/toe raises    Patient left up in chair with all lines intact, call button in reach and sitter present..    GOALS:   Multidisciplinary Problems     Physical Therapy Goals        Problem: Physical Therapy Goal    Goal Priority Disciplines Outcome Goal Variances Interventions   Physical Therapy Goal     PT, PT/OT Ongoing, Progressing     Description: 1. Patient will perform supine to/from sit cga  2. Patient will perform sit to/from stand with RW cga  3. Patient will ambulate 50ft RW min a                   Time Tracking:     PT Received On: 08/01/20  PT Start Time: 1045     PT Stop Time: 1115  PT Total Time (min): 30 min     Billable Minutes: Therapeutic Activity 15 and Therapeutic Exercise 15    Treatment Type: Treatment  PT/PTA: PT     PTA Visit Number: 0     Yoel Al, PT  08/01/2020

## 2020-08-01 NOTE — SUBJECTIVE & OBJECTIVE
Interval History:     Review of Systems   Constitutional: Positive for appetite change.   HENT: Negative.    Eyes: Negative.    Respiratory: Positive for cough.    Gastrointestinal: Positive for nausea.   Endocrine: Negative.    Genitourinary: Negative.    Musculoskeletal: Negative.    Skin: Negative.    Allergic/Immunologic: Negative.    Psychiatric/Behavioral: Positive for decreased concentration.     Objective:     Vital Signs (Most Recent):  Temp: 97 °F (36.1 °C) (08/01/20 0921)  Pulse: (!) 56 (08/01/20 0921)  Resp: 18 (08/01/20 0921)  BP: 130/65 (08/01/20 0921)  SpO2: 96 % (08/01/20 0921) Vital Signs (24h Range):  Temp:  [96.7 °F (35.9 °C)-98.8 °F (37.1 °C)] 97 °F (36.1 °C)  Pulse:  [47-63] 56  Resp:  [18-20] 18  SpO2:  [94 %-98 %] 96 %  BP: ()/(52-68) 130/65     Weight: 91.6 kg (201 lb 15.1 oz)  Body mass index is 33.6 kg/m².    Intake/Output Summary (Last 24 hours) at 8/1/2020 1104  Last data filed at 8/1/2020 0400  Gross per 24 hour   Intake 1265 ml   Output 900 ml   Net 365 ml      Physical Exam  Vitals signs and nursing note reviewed.   Constitutional:       Appearance: She is well-developed.   HENT:      Head: Normocephalic and atraumatic.      Right Ear: External ear normal.      Left Ear: External ear normal.      Nose: Nose normal.   Eyes:      Conjunctiva/sclera: Conjunctivae normal.      Pupils: Pupils are equal, round, and reactive to light.   Neck:      Musculoskeletal: Normal range of motion and neck supple.      Thyroid: No thyromegaly.      Vascular: No JVD.   Cardiovascular:      Rate and Rhythm: Normal rate and regular rhythm.      Heart sounds: Normal heart sounds. No murmur. No friction rub. No gallop.    Pulmonary:      Effort: Pulmonary effort is normal. No respiratory distress.      Breath sounds: No stridor. No wheezing or rales.   Chest:      Chest wall: No tenderness.   Abdominal:      General: Bowel sounds are normal. There is no distension.      Palpations: Abdomen is soft.  There is no mass.      Tenderness: There is no abdominal tenderness. There is no guarding or rebound.   Genitourinary:     Vagina: Normal. No vaginal discharge.   Musculoskeletal: Normal range of motion.         General: Swelling present. No deformity.   Lymphadenopathy:      Cervical: No cervical adenopathy.   Skin:     General: Skin is warm.      Capillary Refill: Capillary refill takes less than 2 seconds.      Findings: No erythema or rash.   Neurological:      Mental Status: She is alert and oriented to person, place, and time.      Cranial Nerves: No cranial nerve deficit.      Sensory: No sensory deficit.      Deep Tendon Reflexes: Reflexes normal.   Psychiatric:         Behavior: Behavior normal.         Significant Labs: All pertinent labs within the past 24 hours have been reviewed.    Significant Imaging: I have reviewed all pertinent imaging results/findings within the past 24 hours.

## 2020-08-01 NOTE — PT/OT/SLP PROGRESS
Occupational Therapy  Treatment    Dilia Talley   MRN: [id number]   Admitting Diagnosis: Pneumonia due to COVID-19 virus    OT Date of Treatment: 08/01/20   OT Start Time: 1110  OT Stop Time: 1134  OT Total Time (min): 24 min    Billable Minutes:  Self Care/Home Management 10 and Therapeutic Activity 14    General Precautions: Standard, airborne, contact, droplet, fall, respiratory, special contact  Orthopedic Precautions:    Braces:           Subjective:  Communicated with NURSE prior to session.      Pain/Comfort  Pain Rating 1: 0/10    Objective:  Patient found with: peripheral IV, oxygen, telemetry     Functional Mobility:  Bed Mobility:       Transfers:        Functional Ambulation: NT    Activities of Daily Living:     Feeding adaptive equipment: NT     UE adaptive equipment: NT     LE adaptive equipment: NT                    Bathing adaptive equipment: NT    Balance:   Static Sit: POOR+: Needs MINIMAL assist to maintain  Dynamic Sit: POOR: N/A  Static Stand: POOR+: Needs MINIMAL assist to maintain  Dynamic stand: 0: N/A    Therapeutic Activities and Exercises:  PATIENT T/F'ED SUPINE > SIT > STAND WITH MAX (A) AND STAND PIVOT T/F EOB > B/S CHAIR WITH HANDHELD (A) OF 2 PERSONS WITH MAX (A).  PATIENT ATTEMPTED TO APPLY LIP BALM BUT WAS UNABLE TO SQUEEZE THE TUBE WITH EITHER HAND.  ONCE THERAPIST SQUEEZED LIP BALM ON (R) HAND, PATIENT WAS ABLE TO APPLY LIP BALM TO MOUTH.  PATIENT WITH LIMITED FUNC USE OF LUE WITH ATTEMPT TO APPLY LIP BALM.  PATIENT PERFORMED (B) SHLD FLEX/EXTEN AAROM X10 REPS.  PATIENT ADVISED TO USE CALL LIGHT AND LET NURSE WHEN SHE IS READY TO RETURN TO BED.    AM-PAC 6 CLICK ADL   How much help from another person does this patient currently need?   1 = Unable, Total/Dependent Assistance  2 = A lot, Maximum/Moderate Assistance  3 = A little, Minimum/Contact Guard/Supervision  4 = None, Modified [place]/Independent    Putting on and taking off regular lower body clothing? : 1  Bathing  "(including washing, rinsing, drying)?: 1  Toileting, which includes using toilet, bedpan, or urinal? : 1  Putting on and taking off regular upper body clothing?: 1  Taking care of personal grooming such as brushing teeth?: 2  Eating meals?: 2  Daily Activity Total Score: 8     AM-PAC Raw Score CMS "G-Code Modifier Level of Impairment Assistance   6 % Total / Unable   7 - 8 CM 80 - 100% Maximal Assist   9-13 CL 60 - 80% Moderate Assist   14 - 19 CK 40 - 60% Moderate Assist   20 - 22 CJ 20 - 40% Minimal Assist   23 CI 1-20% SBA / CGA   24 CH 0% Independent/ Mod I       Patient left up in chair with all lines intact, call button in reach, chair alarm on and PCT present    ASSESSMENT:  Dilia Talley is a 73 y.o. female with a medical diagnosis of Pneumonia due to COVID-19 virus and presents with SELF CARE DEBILITY.    Rehab identified problem list/impairments: Rehab identified problem list/impairments: weakness, impaired endurance, impaired self care skills, impaired functional mobilty, gait instability, impaired balance, decreased coordination, decreased upper extremity function, decreased lower extremity function, decreased safety awareness, decreased ROM, impaired coordination, impaired fine motor    Rehab potential is good.    Activity tolerance: Fair    Discharge recommendations: Discharge Facility/Level of Care Needs: nursing facility, skilled     Barriers to discharge:      Equipment recommendations: none     GOALS:   Multidisciplinary Problems     Occupational Therapy Goals        Problem: Occupational Therapy Goal    Goal Priority Disciplines Outcome Interventions   Occupational Therapy Goal     OT, PT/OT     Description: OT GOALS TO BE MET BY 8-6-20  MIN A WITH UE DRESSING  PT WILL TOLERATE 1 SET X 15 REPS B UE ROM EXERCISE  S WITH BSC T/F'S                       Plan:  Patient to be seen 3 x/week to address the above listed problems via self-care/home management, therapeutic activities, therapeutic " exercises  Plan of Care expires: 08/06/20  Plan of Care reviewed with: patient         Mira King, OT  08/01/2020

## 2020-08-01 NOTE — ASSESSMENT & PLAN NOTE
Possible Pre Renal from poor oral intake vs Intrinsic 2/2 COVID   IVF  Monitor   Avoid nephrotoxic meds   Resolved

## 2020-08-02 LAB
ALBUMIN SERPL BCP-MCNC: 2.3 G/DL (ref 3.5–5.2)
ALBUMIN SERPL BCP-MCNC: 2.3 G/DL (ref 3.5–5.2)
ALLENS TEST: ABNORMAL
ALP SERPL-CCNC: 69 U/L (ref 55–135)
ALP SERPL-CCNC: 71 U/L (ref 55–135)
ALT SERPL W/O P-5'-P-CCNC: 15 U/L (ref 10–44)
ALT SERPL W/O P-5'-P-CCNC: 17 U/L (ref 10–44)
ANION GAP SERPL CALC-SCNC: 12 MMOL/L (ref 8–16)
ANION GAP SERPL CALC-SCNC: 13 MMOL/L (ref 8–16)
AST SERPL-CCNC: 24 U/L (ref 10–40)
AST SERPL-CCNC: 28 U/L (ref 10–40)
BILIRUB SERPL-MCNC: 0.3 MG/DL (ref 0.1–1)
BILIRUB SERPL-MCNC: 0.4 MG/DL (ref 0.1–1)
BUN SERPL-MCNC: 16 MG/DL (ref 8–23)
BUN SERPL-MCNC: 16 MG/DL (ref 8–23)
CALCIUM SERPL-MCNC: 9.4 MG/DL (ref 8.7–10.5)
CALCIUM SERPL-MCNC: 9.6 MG/DL (ref 8.7–10.5)
CHLORIDE SERPL-SCNC: 115 MMOL/L (ref 95–110)
CHLORIDE SERPL-SCNC: 115 MMOL/L (ref 95–110)
CO2 SERPL-SCNC: 12 MMOL/L (ref 23–29)
CO2 SERPL-SCNC: 13 MMOL/L (ref 23–29)
CREAT SERPL-MCNC: 0.8 MG/DL (ref 0.5–1.4)
CREAT SERPL-MCNC: 0.9 MG/DL (ref 0.5–1.4)
DELSYS: ABNORMAL
EST. GFR  (AFRICAN AMERICAN): >60 ML/MIN/1.73 M^2
EST. GFR  (AFRICAN AMERICAN): >60 ML/MIN/1.73 M^2
EST. GFR  (NON AFRICAN AMERICAN): >60 ML/MIN/1.73 M^2
EST. GFR  (NON AFRICAN AMERICAN): >60 ML/MIN/1.73 M^2
FLOW: 2.5
GLUCOSE SERPL-MCNC: 103 MG/DL (ref 70–110)
GLUCOSE SERPL-MCNC: 148 MG/DL (ref 70–110)
GLUCOSE SERPL-MCNC: 170 MG/DL (ref 70–110)
HCO3 UR-SCNC: 19.3 MMOL/L (ref 24–28)
HCT VFR BLD CALC: 33 %PCV (ref 36–54)
MODE: ABNORMAL
PCO2 BLDA: 30.4 MMHG (ref 35–45)
PH SMN: 7.41 [PH] (ref 7.35–7.45)
PO2 BLDA: 78 MMHG (ref 80–100)
POC BE: -5 MMOL/L
POC IONIZED CALCIUM: 1.42 MMOL/L (ref 1.06–1.42)
POC SATURATED O2: 96 % (ref 95–100)
POCT GLUCOSE: 107 MG/DL (ref 70–110)
POCT GLUCOSE: 110 MG/DL (ref 70–110)
POCT GLUCOSE: 175 MG/DL (ref 70–110)
POTASSIUM BLD-SCNC: 3.7 MMOL/L (ref 3.5–5.1)
POTASSIUM SERPL-SCNC: 4.1 MMOL/L (ref 3.5–5.1)
POTASSIUM SERPL-SCNC: 4.3 MMOL/L (ref 3.5–5.1)
PROT SERPL-MCNC: 6.9 G/DL (ref 6–8.4)
PROT SERPL-MCNC: 7.3 G/DL (ref 6–8.4)
SAMPLE: ABNORMAL
SITE: ABNORMAL
SODIUM BLD-SCNC: 143 MMOL/L (ref 136–145)
SODIUM SERPL-SCNC: 139 MMOL/L (ref 136–145)
SODIUM SERPL-SCNC: 141 MMOL/L (ref 136–145)

## 2020-08-02 PROCEDURE — 80053 COMPREHEN METABOLIC PANEL: CPT | Mod: 91,HCWC

## 2020-08-02 PROCEDURE — 93010 ELECTROCARDIOGRAM REPORT: CPT | Mod: HCWC,,, | Performed by: INTERNAL MEDICINE

## 2020-08-02 PROCEDURE — 97530 THERAPEUTIC ACTIVITIES: CPT | Mod: HCWC

## 2020-08-02 PROCEDURE — 99900035 HC TECH TIME PER 15 MIN (STAT): Mod: HCWC

## 2020-08-02 PROCEDURE — 97110 THERAPEUTIC EXERCISES: CPT | Mod: HCWC

## 2020-08-02 PROCEDURE — 25000003 PHARM REV CODE 250: Mod: HCWC | Performed by: INTERNAL MEDICINE

## 2020-08-02 PROCEDURE — 25000242 PHARM REV CODE 250 ALT 637 W/ HCPCS: Mod: HCWC | Performed by: INTERNAL MEDICINE

## 2020-08-02 PROCEDURE — 36415 COLL VENOUS BLD VENIPUNCTURE: CPT | Mod: HCWC

## 2020-08-02 PROCEDURE — 21400001 HC TELEMETRY ROOM: Mod: HCWC

## 2020-08-02 PROCEDURE — S5010 5% DEXTROSE AND 0.45% SALINE: HCPCS | Mod: HCWC | Performed by: INTERNAL MEDICINE

## 2020-08-02 PROCEDURE — 63600175 PHARM REV CODE 636 W HCPCS: Mod: HCWC | Performed by: INTERNAL MEDICINE

## 2020-08-02 PROCEDURE — 94640 AIRWAY INHALATION TREATMENT: CPT | Mod: HCWC

## 2020-08-02 PROCEDURE — 93010 EKG 12-LEAD: ICD-10-PCS | Mod: HCWC,,, | Performed by: INTERNAL MEDICINE

## 2020-08-02 PROCEDURE — 93005 ELECTROCARDIOGRAM TRACING: CPT | Mod: HCWC

## 2020-08-02 PROCEDURE — 25000003 PHARM REV CODE 250: Mod: HCWC | Performed by: HOSPITALIST

## 2020-08-02 PROCEDURE — 27000221 HC OXYGEN, UP TO 24 HOURS: Mod: HCWC

## 2020-08-02 PROCEDURE — 36600 WITHDRAWAL OF ARTERIAL BLOOD: CPT | Mod: HCWC

## 2020-08-02 PROCEDURE — 94761 N-INVAS EAR/PLS OXIMETRY MLT: CPT | Mod: HCWC

## 2020-08-02 PROCEDURE — 97535 SELF CARE MNGMENT TRAINING: CPT | Mod: HCWC

## 2020-08-02 RX ORDER — NYSTATIN 100000 [USP'U]/ML
500000 SUSPENSION ORAL 4 TIMES DAILY
Status: DISCONTINUED | OUTPATIENT
Start: 2020-08-02 | End: 2020-08-04 | Stop reason: HOSPADM

## 2020-08-02 RX ORDER — ENOXAPARIN SODIUM 100 MG/ML
40 INJECTION SUBCUTANEOUS EVERY 24 HOURS
Status: DISCONTINUED | OUTPATIENT
Start: 2020-08-02 | End: 2020-08-04 | Stop reason: HOSPADM

## 2020-08-02 RX ORDER — ALBUTEROL SULFATE 0.83 MG/ML
2.5 SOLUTION RESPIRATORY (INHALATION) ONCE
Status: COMPLETED | OUTPATIENT
Start: 2020-08-02 | End: 2020-08-02

## 2020-08-02 RX ADMIN — ALBUTEROL SULFATE 2.5 MG: 2.5 SOLUTION RESPIRATORY (INHALATION) at 06:08

## 2020-08-02 RX ADMIN — DEXTROSE MONOHYDRATE AND SODIUM CHLORIDE: 5; .45 INJECTION, SOLUTION INTRAVENOUS at 04:08

## 2020-08-02 RX ADMIN — ENOXAPARIN SODIUM 40 MG: 100 INJECTION SUBCUTANEOUS at 05:08

## 2020-08-02 RX ADMIN — HYDROXYZINE PAMOATE 25 MG: 25 CAPSULE ORAL at 12:08

## 2020-08-02 RX ADMIN — OXYCODONE HYDROCHLORIDE AND ACETAMINOPHEN 500 MG: 500 TABLET ORAL at 12:08

## 2020-08-02 RX ADMIN — Medication 100 MG: at 12:08

## 2020-08-02 RX ADMIN — ATORVASTATIN CALCIUM 40 MG: 40 TABLET, FILM COATED ORAL at 12:08

## 2020-08-02 RX ADMIN — ZINC SULFATE 220 MG (50 MG) CAPSULE 220 MG: CAPSULE at 12:08

## 2020-08-02 RX ADMIN — DEXAMETHASONE 6 MG: 4 TABLET ORAL at 12:08

## 2020-08-02 RX ADMIN — NYSTATIN 500000 UNITS: 100000 SUSPENSION ORAL at 09:08

## 2020-08-02 NOTE — SUBJECTIVE & OBJECTIVE
Interval History:     Review of Systems   Constitutional: Positive for appetite change.   HENT: Negative.    Eyes: Negative.    Respiratory: Positive for cough.    Gastrointestinal: Positive for nausea.   Endocrine: Negative.    Genitourinary: Negative.    Musculoskeletal: Negative.    Skin: Negative.    Allergic/Immunologic: Negative.    Psychiatric/Behavioral: Positive for decreased concentration.     Objective:     Vital Signs (Most Recent):  Temp: 97.8 °F (36.6 °C) (08/02/20 1613)  Pulse: 60 (08/02/20 1613)  Resp: 19 (08/02/20 1613)  BP: 139/61 (08/02/20 1613)  SpO2: 97 % (08/02/20 1613) Vital Signs (24h Range):  Temp:  [97.4 °F (36.3 °C)-97.8 °F (36.6 °C)] 97.8 °F (36.6 °C)  Pulse:  [46-74] 60  Resp:  [16-20] 19  SpO2:  [92 %-98 %] 97 %  BP: (121-148)/(58-77) 139/61     Weight: 92.4 kg (203 lb 11.3 oz)  Body mass index is 33.9 kg/m².    Intake/Output Summary (Last 24 hours) at 8/2/2020 1659  Last data filed at 8/2/2020 0600  Gross per 24 hour   Intake 500 ml   Output 500 ml   Net 0 ml      Physical Exam  Vitals signs and nursing note reviewed.   Constitutional:       Appearance: She is well-developed.   HENT:      Head: Normocephalic and atraumatic.      Right Ear: External ear normal.      Left Ear: External ear normal.      Nose: Nose normal.   Eyes:      Conjunctiva/sclera: Conjunctivae normal.      Pupils: Pupils are equal, round, and reactive to light.   Neck:      Musculoskeletal: Normal range of motion and neck supple.      Thyroid: No thyromegaly.      Vascular: No JVD.   Cardiovascular:      Rate and Rhythm: Normal rate and regular rhythm.      Heart sounds: Normal heart sounds. No murmur. No friction rub. No gallop.    Pulmonary:      Effort: Pulmonary effort is normal. No respiratory distress.      Breath sounds: No stridor. No wheezing or rales.   Chest:      Chest wall: No tenderness.   Abdominal:      General: Bowel sounds are normal. There is no distension.      Palpations: Abdomen is soft. There  is no mass.      Tenderness: There is no abdominal tenderness. There is no guarding or rebound.   Genitourinary:     Vagina: Normal. No vaginal discharge.   Musculoskeletal: Normal range of motion.         General: Swelling present. No deformity.   Lymphadenopathy:      Cervical: No cervical adenopathy.   Skin:     General: Skin is warm.      Capillary Refill: Capillary refill takes less than 2 seconds.      Findings: No erythema or rash.   Neurological:      Mental Status: She is alert and oriented to person, place, and time.      Cranial Nerves: No cranial nerve deficit.      Sensory: No sensory deficit.      Deep Tendon Reflexes: Reflexes normal.   Psychiatric:         Behavior: Behavior normal.         Significant Labs: All pertinent labs within the past 24 hours have been reviewed.    Significant Imaging: I have reviewed all pertinent imaging results/findings within the past 24 hours.

## 2020-08-02 NOTE — PLAN OF CARE
Patient continues to be monitored and treated. [facility] medicine informed. Lovenox order obtained for VTE. Blood sugar monitored. No treatment required. Patient has rested throughout the day. Will continue to monitor and treat.

## 2020-08-02 NOTE — PT/OT/SLP PROGRESS
"Occupational Therapy      Patient Name:  Dilia Talley   MRN:  [id number]    TREATMENT TIME:  7520-8586    S:   "I'M COLD."  "THAT'S ALL I WANT."--PATIENT'S C/O AFTER EATING 1/2 CUP OF CHOCOLATE PUDDING.     O:  PATIENT ABLE TO SCOOP PUDDING CUP TO MOUTH WITH (R) HAND EATING APPROXIMATELY 3 BITES WITH C/O HAVING NO TASTE FOR ANYTHING.  PATIENT T/F'ED B/S CHAIR > STAND PIVOT TO EOB WITH MAX (A).  PATIENT SAT EOB  WITH (W) AND PERFORMED 10 REPS OF NECK EXTENSION TO NEUTRAL AROM AND 10 REPS OF (B) SCAP RETRAC DUE TO PATIENT SITS WITH INCREASED NECK FLEX AND ROUNDED SHLDS.  PATIENT REMAINED SEATED OOB WITH NURSE SO THAT NURSE CAN ISSUE MEDS.    A:  PATIENT REQUIRES MAX VC AND MAX GESTURES FOR CARRYOVER WITH NECK AND SCAP RETRAC AROM SEATED EOB.  NO LOB WITH THERE EX WHILE SEATED EOB.    P:  CONT OT TX TO INCREASE PATIENT'S SAFETY AND (I) WITH ALL AREAS OF SELF CARE.      CHARGES:  TA1  ADL1    Mira King, OT  8/2/2020  "

## 2020-08-02 NOTE — PROGRESS NOTES
[facility] Hospital Medicine  Progress Note    Patient Name: Dilia Talley  MRN: [id number]  Patient Class: IP- Inpatient   Admission Date: 7/28/2020  Length of Stay: 5 days  Attending Physician: Carlton Del Angel, *  Primary Care Provider: Leif Townsend MD        Subjective:     Principal Problem:Pneumonia due to COVID-19 virus        HPI:  Mrs. Talley is a 74 yo F with Hx of HTN, DM, HLD, who presents for evaluation of SOB. Patient is COVID+ and has been seen in the ED  4 times in the last two weeks for similar complaints. She is on home oxygen. Today she reports worsening of her SOB, associated with fever, chills, and dry cough. She was unable to take care of herself and seeked help. In ED her labs were noted to have new onset SAMMI with low BP and CXR with worsening infiltration. Patient is admitted for further management.                  Overview/Hospital Course:  7/29 pt was seen and examined at bedside  . She is aao x 2 in nad . She is pleasant confuse . She follow commands . She is  Complaining of SOB .  The case was discussed with Her grandson William Talley iv ( He Voice is the POA, Meera number [phone])  7/30 Pt was seen and examined at bedside . She Is more alert  Today . She is on 2 lt by nc . The Case was D/w William Talley iv ( He Voice is the POA, Meera number [phone]) and REFUSED  REMDESIVIR Tx .   7/31 Pt was seen and examined at bedside . She is aao x 2  In nad . She has a very poor appetite . She is on 2 lt by nasal canula   8/1 Pt was seen and examined at bedside . She is aao x 3 in nad . She has some confusion on and off . There e was no acute event overnight . She is on 3 lt  By nc   8/2 pt is aao x 2 in nad . The CO2 is 13 . TheCBG has been < than 250 , There is No  Significant anion gap . We will don  A ABG and d./c the D5 1/2 ns . She has some  sinus bradycardia on and off  .     Interval History:     Review of Systems   Constitutional: Positive for appetite change.    HENT: Negative.    Eyes: Negative.    Respiratory: Positive for cough.    Gastrointestinal: Positive for nausea.   Endocrine: Negative.    Genitourinary: Negative.    Musculoskeletal: Negative.    Skin: Negative.    Allergic/Immunologic: Negative.    Psychiatric/Behavioral: Positive for decreased concentration.     Objective:     Vital Signs (Most Recent):  Temp: 97.8 °F (36.6 °C) (08/02/20 1613)  Pulse: 60 (08/02/20 1613)  Resp: 19 (08/02/20 1613)  BP: 139/61 (08/02/20 1613)  SpO2: 97 % (08/02/20 1613) Vital Signs (24h Range):  Temp:  [97.4 °F (36.3 °C)-97.8 °F (36.6 °C)] 97.8 °F (36.6 °C)  Pulse:  [46-74] 60  Resp:  [16-20] 19  SpO2:  [92 %-98 %] 97 %  BP: (121-148)/(58-77) 139/61     Weight: 92.4 kg (203 lb 11.3 oz)  Body mass index is 33.9 kg/m².    Intake/Output Summary (Last 24 hours) at 8/2/2020 1659  Last data filed at 8/2/2020 0600  Gross per 24 hour   Intake 500 ml   Output 500 ml   Net 0 ml      Physical Exam  Vitals signs and nursing note reviewed.   Constitutional:       Appearance: She is well-developed.   HENT:      Head: Normocephalic and atraumatic.      Right Ear: External ear normal.      Left Ear: External ear normal.      Nose: Nose normal.   Eyes:      Conjunctiva/sclera: Conjunctivae normal.      Pupils: Pupils are equal, round, and reactive to light.   Neck:      Musculoskeletal: Normal range of motion and neck supple.      Thyroid: No thyromegaly.      Vascular: No JVD.   Cardiovascular:      Rate and Rhythm: Normal rate and regular rhythm.      Heart sounds: Normal heart sounds. No murmur. No friction rub. No gallop.    Pulmonary:      Effort: Pulmonary effort is normal. No respiratory distress.      Breath sounds: No stridor. No wheezing or rales.   Chest:      Chest wall: No tenderness.   Abdominal:      General: Bowel sounds are normal. There is no distension.      Palpations: Abdomen is soft. There is no mass.      Tenderness: There is no abdominal tenderness. There is no guarding or  rebound.   Genitourinary:     Vagina: Normal. No vaginal discharge.   Musculoskeletal: Normal range of motion.         General: Swelling present. No deformity.   Lymphadenopathy:      Cervical: No cervical adenopathy.   Skin:     General: Skin is warm.      Capillary Refill: Capillary refill takes less than 2 seconds.      Findings: No erythema or rash.   Neurological:      Mental Status: She is alert and oriented to person, place, and time.      Cranial Nerves: No cranial nerve deficit.      Sensory: No sensory deficit.      Deep Tendon Reflexes: Reflexes normal.   Psychiatric:         Behavior: Behavior normal.         Significant Labs: All pertinent labs within the past 24 hours have been reviewed.    Significant Imaging: I have reviewed all pertinent imaging results/findings within the past 24 hours.      Assessment/Plan:      * Pneumonia due to COVID-19 virus  - COVID-19 testing   - Infection Control notified     - Isolation:   - Airborne, Contact and Droplet Precautions  - Cohort patients into COVID units  - N95 mask, wear eye protection  - 20 second hand hygiene              - Limit visitors per hospital policy              - Consolidating lab draws, nursing care, provider visits, and interventions    - Diagnostics: (leukopenia, hyponatremia, hyperferritinemia, elevated troponin, elevated d-dimer, age, and comorbidities are significant predictors of poor clinical outcome)  CBC, CMP, Ferritin, CRP, LDH, BNP, Troponin and Portable CXR    - Management:  Supplemental O2 to maintain SpO2 >92%  Continuous/intermittent Pulse Ox  Albuterol treatment PRN    -Cont zinc , vit c  -cont  decadron  -The case was discussed with Her grandson William Talley elmer ( He Voice is the POA, Meera number [phone]). He Refused Remdesevir Tx   -s/p Doxycycline and Ceftriaxone  -wean off o2   -SNF placement                      SAMMI (acute kidney injury)  Possible Pre Renal from poor oral intake vs Intrinsic 2/2 COVID   IVF  Monitor    Avoid nephrotoxic meds   Resolved       Severe sepsis  S/p IVF, Cultures and ABX   Monitor       Diabetes mellitus type 2 with neurological manifestations  Hba1c 6  ISS  Diabetic Diet   S/P  D51/2 ns . She does not have a good appetite    Pt has a co2 of 13 , no significant anion gap   And CBG < 250 . We will do an ABG        VTE Risk Mitigation (From admission, onward)         Ordered     enoxaparin injection 40 mg  Every 24 hours      08/02/20 1444                      Cartlon Del Angel MD  Department of Hospital Medicine   [facility]

## 2020-08-02 NOTE — ASSESSMENT & PLAN NOTE
Hba1c 6  ISS  Diabetic Diet   S/P  D51/2 ns . She does not have a good appetite    Pt has a co2 of 13 , no significant anion gap   And CBG < 250 . We will do an ABG

## 2020-08-02 NOTE — ASSESSMENT & PLAN NOTE
- COVID-19 testing   - Infection Control notified     - Isolation:   - Airborne, Contact and Droplet Precautions  - Cohort patients into COVID units  - N95 mask, wear eye protection  - 20 second hand hygiene              - Limit visitors per hospital policy              - Consolidating lab draws, nursing care, provider visits, and interventions    - Diagnostics: (leukopenia, hyponatremia, hyperferritinemia, elevated troponin, elevated d-dimer, age, and comorbidities are significant predictors of poor clinical outcome)  CBC, CMP, Ferritin, CRP, LDH, BNP, Troponin and Portable CXR    - Management:  Supplemental O2 to maintain SpO2 >92%  Continuous/intermittent Pulse Ox  Albuterol treatment PRN    -Cont zinc , vit c  -cont  decadron  -The case was discussed with Her grandson William Talley elmer ( He Voice is the POA, Meera number [phone]). He Refused Remdesevir Tx   -s/p Doxycycline and Ceftriaxone  -wean off o2   -SNF placement

## 2020-08-02 NOTE — NURSING
Patient pulled out IV. Heart rate dropped to 30s. Notified NP, Taz. Was instructed that no interventions would be done since patient is asymptomatic.

## 2020-08-02 NOTE — PT/OT/SLP PROGRESS
Physical Therapy Treatment    Patient Name:  Dilia Talley   MRN:  [id number]    Recommendations:     Discharge Recommendations:  nursing facility, skilled   Discharge Equipment Recommendations: (TBD)   Barriers to discharge: Decreased caregiver support    Assessment:     Max a with transfers with poor safety awareness. Only able to tolerate 5 reps each of LE exercises    Rehab Prognosis: Fair; patient would benefit from acute skilled PT services to address these deficits and reach maximum level of function.    Recent Surgery: * No surgery found *      Plan:     During this hospitalization, patient to be seen 5 x/week to address the identified rehab impairments via gait training, therapeutic activities, therapeutic exercises and progress toward the following goals:    · Plan of Care Expires:  08/05/20    Subjective     Chief Complaint: fatigue  Patient/Family Comments/goals: improve strength  Pain/Comfort:  ·        Objective:     Communicated with nursing prior to session.  Patient found up in chair with   upon PT entry to room. States has been sitting for 1 hr and only willing to go to bed and do a few exercises  General Precautions: Standard, airborne, fall   Orthopedic Precautions:N/A   Braces:       Functional Mobility:  · Bed Mobility:     · Sit to Supine: moderate assistance  · Transfers:     · Bed to Chair: maximal assistance with  no AD  using  Stand Pivot      AM-PAC 6 CLICK MOBILITY  Turning over in bed (including adjusting bedclothes, sheets and blankets)?: 3  Sitting down on and standing up from a chair with arms (e.g., wheelchair, bedside commode, etc.): 2  Moving from lying on back to sitting on the side of the bed?: 2  Moving to and from a bed to a chair (including a wheelchair)?: 2  Need to walk in hospital room?: 2  Climbing 3-5 steps with a railing?: 1  Basic Mobility Total Score: 12       Therapeutic Activities and Exercises:   Max A with transfer back to bed with Max VC for safety. Cont to have poor  safety awareness with confusion.  Performed seated LE exercises x 5 reps each- marching, heel/toe raises and LAQ    Patient left supine with all lines intact, call button in reach and sitter present..    GOALS:   Multidisciplinary Problems     Physical Therapy Goals        Problem: Physical Therapy Goal    Goal Priority Disciplines Outcome Goal Variances Interventions   Physical Therapy Goal     PT, PT/OT Ongoing, Progressing     Description: 1. Patient will perform supine to/from sit cga  2. Patient will perform sit to/from stand with RW cga  3. Patient will ambulate 50ft RW min a                   Time Tracking:     PT Received On: 08/02/20  PT Start Time: 1115     PT Stop Time: 1130  PT Total Time (min): 15 min     Billable Minutes: Therapeutic Exercise 15    Treatment Type: Treatment  PT/PTA: PT     PTA Visit Number: 0     Yoel Al, PT  08/02/2020

## 2020-08-03 PROBLEM — R00.1 BRADYCARDIA: Status: ACTIVE | Noted: 2020-08-03

## 2020-08-03 LAB
ALBUMIN SERPL BCP-MCNC: 2.1 G/DL (ref 3.5–5.2)
ALP SERPL-CCNC: 69 U/L (ref 55–135)
ALT SERPL W/O P-5'-P-CCNC: 15 U/L (ref 10–44)
ANION GAP SERPL CALC-SCNC: 8 MMOL/L (ref 8–16)
AST SERPL-CCNC: 16 U/L (ref 10–40)
BILIRUB SERPL-MCNC: 0.4 MG/DL (ref 0.1–1)
BUN SERPL-MCNC: 16 MG/DL (ref 8–23)
CALCIUM SERPL-MCNC: 9.1 MG/DL (ref 8.7–10.5)
CHLORIDE SERPL-SCNC: 112 MMOL/L (ref 95–110)
CO2 SERPL-SCNC: 21 MMOL/L (ref 23–29)
CREAT SERPL-MCNC: 0.8 MG/DL (ref 0.5–1.4)
CRP SERPL-MCNC: 32.8 MG/L (ref 0–8.2)
EST. GFR  (AFRICAN AMERICAN): >60 ML/MIN/1.73 M^2
EST. GFR  (NON AFRICAN AMERICAN): >60 ML/MIN/1.73 M^2
FERRITIN SERPL-MCNC: 77 NG/ML (ref 20–300)
GLUCOSE SERPL-MCNC: 102 MG/DL (ref 70–110)
POCT GLUCOSE: 102 MG/DL (ref 70–110)
POCT GLUCOSE: 110 MG/DL (ref 70–110)
POCT GLUCOSE: 153 MG/DL (ref 70–110)
POCT GLUCOSE: 165 MG/DL (ref 70–110)
POTASSIUM SERPL-SCNC: 3.9 MMOL/L (ref 3.5–5.1)
PROT SERPL-MCNC: 6.4 G/DL (ref 6–8.4)
SODIUM SERPL-SCNC: 141 MMOL/L (ref 136–145)

## 2020-08-03 PROCEDURE — 25000003 PHARM REV CODE 250: Mod: HCWC | Performed by: INTERNAL MEDICINE

## 2020-08-03 PROCEDURE — 97530 THERAPEUTIC ACTIVITIES: CPT | Mod: HCWC | Performed by: PHYSICAL THERAPIST

## 2020-08-03 PROCEDURE — 97110 THERAPEUTIC EXERCISES: CPT | Mod: HCWC

## 2020-08-03 PROCEDURE — 80053 COMPREHEN METABOLIC PANEL: CPT | Mod: HCWC

## 2020-08-03 PROCEDURE — 99223 1ST HOSP IP/OBS HIGH 75: CPT | Mod: HCWC,,, | Performed by: INTERNAL MEDICINE

## 2020-08-03 PROCEDURE — 36415 COLL VENOUS BLD VENIPUNCTURE: CPT | Mod: HCWC

## 2020-08-03 PROCEDURE — 96372 THER/PROPH/DIAG INJ SC/IM: CPT | Mod: HCWC

## 2020-08-03 PROCEDURE — 25000003 PHARM REV CODE 250: Mod: HCWC | Performed by: HOSPITALIST

## 2020-08-03 PROCEDURE — 97110 THERAPEUTIC EXERCISES: CPT | Mod: HCWC | Performed by: PHYSICAL THERAPIST

## 2020-08-03 PROCEDURE — 86140 C-REACTIVE PROTEIN: CPT | Mod: HCWC

## 2020-08-03 PROCEDURE — 94761 N-INVAS EAR/PLS OXIMETRY MLT: CPT | Mod: HCWC

## 2020-08-03 PROCEDURE — 21400001 HC TELEMETRY ROOM: Mod: HCWC

## 2020-08-03 PROCEDURE — 82728 ASSAY OF FERRITIN: CPT | Mod: HCWC

## 2020-08-03 PROCEDURE — 27000221 HC OXYGEN, UP TO 24 HOURS: Mod: HCWC

## 2020-08-03 PROCEDURE — 97116 GAIT TRAINING THERAPY: CPT | Mod: HCWC

## 2020-08-03 PROCEDURE — 99223 PR INITIAL HOSPITAL CARE,LEVL III: ICD-10-PCS | Mod: HCWC,,, | Performed by: INTERNAL MEDICINE

## 2020-08-03 PROCEDURE — 63600175 PHARM REV CODE 636 W HCPCS: Mod: HCWC | Performed by: INTERNAL MEDICINE

## 2020-08-03 RX ORDER — MUPIROCIN 20 MG/G
OINTMENT TOPICAL 2 TIMES DAILY
Status: DISCONTINUED | OUTPATIENT
Start: 2020-08-03 | End: 2020-08-04 | Stop reason: HOSPADM

## 2020-08-03 RX ADMIN — NYSTATIN 500000 UNITS: 100000 SUSPENSION ORAL at 08:08

## 2020-08-03 RX ADMIN — NYSTATIN 500000 UNITS: 100000 SUSPENSION ORAL at 12:08

## 2020-08-03 RX ADMIN — MUPIROCIN: 20 OINTMENT TOPICAL at 08:08

## 2020-08-03 RX ADMIN — ATORVASTATIN CALCIUM 40 MG: 40 TABLET, FILM COATED ORAL at 08:08

## 2020-08-03 RX ADMIN — ZINC SULFATE 220 MG (50 MG) CAPSULE 220 MG: CAPSULE at 08:08

## 2020-08-03 RX ADMIN — OXYCODONE HYDROCHLORIDE AND ACETAMINOPHEN 500 MG: 500 TABLET ORAL at 08:08

## 2020-08-03 RX ADMIN — HYDROXYZINE PAMOATE 25 MG: 25 CAPSULE ORAL at 05:08

## 2020-08-03 RX ADMIN — MUPIROCIN: 20 OINTMENT TOPICAL at 09:08

## 2020-08-03 RX ADMIN — DEXAMETHASONE 6 MG: 4 TABLET ORAL at 08:08

## 2020-08-03 RX ADMIN — NYSTATIN 500000 UNITS: 100000 SUSPENSION ORAL at 04:08

## 2020-08-03 RX ADMIN — INSULIN ASPART 1 UNITS: 100 INJECTION, SOLUTION INTRAVENOUS; SUBCUTANEOUS at 09:08

## 2020-08-03 RX ADMIN — Medication 100 MG: at 08:08

## 2020-08-03 RX ADMIN — ENOXAPARIN SODIUM 40 MG: 100 INJECTION SUBCUTANEOUS at 04:08

## 2020-08-03 NOTE — ASSESSMENT & PLAN NOTE
Patient currently asymptomatic.   Bradycardia possibly from Covid infection   Recommend atropine PRN if patient becomes symptomatic.   No plans at this time

## 2020-08-03 NOTE — PLAN OF CARE
Per Elliot at [facility].  Received authorization for SNF but states they are not in-network for her insurance.  [facility] is in-network.  Referral sent. Left message for Jay tim to call .       08/03/20 0953   Post-Acute Status   Post-Acute Authorization Placement   Post-Acute Placement Status Referrals Sent     1:00pm Spoke the Jay with [facility].  They are reviewing and need to verify if they will accept covid+ patient.  Advised patient will be going nursing home after skilled.      3:00pm Left message for Jay to give update on acceptance.    5:30pm Note in [facility] Patient was accepted to [facility]. Will need to get SNF authorization transferred to [facility].

## 2020-08-03 NOTE — HPI
Mrs. Talley is a 74 yo F with Hx of HTN, DM, HLD, who presents for evaluation of SOB. Patient is COVID+.  Cardiology consulted for bradycardia with HR in the 30's-40's.   Patient not on an AV node blocking agents at home or during admit.   Patient with no symptoms    Chart reviewed and plan formulated. Not examined due to COVID

## 2020-08-03 NOTE — PT/OT/SLP PROGRESS
"Occupational Therapy  Treatment    Dilia Talley   MRN: [id number]   Admitting Diagnosis: Pneumonia due to COVID-19 virus    OT Date of Treatment: 08/03/20   OT Start Time: 1023  OT Stop Time: 1046  OT Total Time (min): 23 min    Billable Minutes:  Therapeutic Activity 15 min and Therapeutic Exercise 8 min    General Precautions: Standard, airborne, fall, contact, droplet  Orthopedic Precautions: N/A  Braces: N/A         Subjective:  Communicated with Nurse Sanchez and epic chart review prior to session.  Pt found supine in bed and agreeable to tx at this time.  Pain/Comfort  Pain Rating 1: 4/10  Location - Side 1: Left  Location 1: knee  Pain Addressed 1: Reposition, Distraction  Pain Rating Post-Intervention 1: 4/10    Objective:  Patient found with: peripheral IV, telemetry, oxygen     Functional Mobility:  Therapeutic Activities and Exercises:  Pt performed supine>sit with Max A and extended time to complete, scooted to EOB with Mod A, sit>stand with Mod A using RW, functional mobility using RW ~20ft with Min A, t/f to chair using RW with Mod A. Pt performed (B) UE ROM therapeutic exercises in sitting 1 x 15 reps: shoulder, bicep curls, chest press.     AM-PAC 6 CLICK ADL   How much help from another person does this patient currently need?   1 = Unable, Total/Dependent Assistance  2 = A lot, Maximum/Moderate Assistance  3 = A little, Minimum/Contact Guard/Supervision  4 = None, Modified [place]/Independent    Putting on and taking off regular lower body clothing? : 1  Bathing (including washing, rinsing, drying)?: 1  Toileting, which includes using toilet, bedpan, or urinal? : 1  Putting on and taking off regular upper body clothing?: 2  Taking care of personal grooming such as brushing teeth?: 2  Eating meals?: 2  Daily Activity Total Score: 9     AM-PAC Raw Score CMS "G-Code Modifier Level of Impairment Assistance   6 % Total / Unable   7 - 8 CM 80 - 100% Maximal Assist   9-13 CL 60 - 80% Moderate Assist "   14 - 19 CK 40 - 60% Moderate Assist   20 - 22 CJ 20 - 40% Minimal Assist   23 CI 1-20% SBA / CGA   24 CH 0% Independent/ Mod I       Patient left up in chair with all lines intact, call button in reach, chair alarm on and Nurse Daniel notified    ASSESSMENT:  Dilia Talley is a 73 y.o. female with a medical diagnosis of Pneumonia due to COVID-19 virus and presents with impaired functional mobility and ADLs. Pt will benefit from continued skilled OT in order to address impairments.     Rehab identified problem list/impairments: Rehab identified problem list/impairments: weakness, impaired endurance, impaired self care skills, impaired functional mobilty, decreased coordination, pain, decreased safety awareness, impaired balance, decreased lower extremity function, decreased upper extremity function, gait instability    Rehab potential is fair.    Activity tolerance: Fair    Discharge recommendations: Discharge Facility/Level of Care Needs: nursing facility, skilled     Barriers to discharge:  UNKNOWN    Equipment recommendations: (TBD)     GOALS:   Multidisciplinary Problems     Occupational Therapy Goals        Problem: Occupational Therapy Goal    Goal Priority Disciplines Outcome Interventions   Occupational Therapy Goal     OT, PT/OT Ongoing, Progressing    Description: OT GOALS TO BE MET BY 8-6-20  MIN A WITH UE DRESSING  PT WILL TOLERATE 1 SET X 15 REPS B UE ROM EXERCISE  S WITH BSC T/F'S                       Plan:  Patient to be seen 3 x/week to address the above listed problems via self-care/home management, therapeutic activities, therapeutic exercises  Plan of Care expires: 08/06/20  Plan of Care reviewed with: patient         Klarissa Ledezma, PT/OT  08/03/2020

## 2020-08-03 NOTE — CONSULTS
[facility]  Cardiology  Consult Note    Patient Name: Dilia Talley  MRN: [id number]  Admission Date: 7/28/2020  Hospital Length of Stay: 6 days  Code Status: No Order   Attending Provider: Sonu Colón MD   Consulting Provider: LCIFFORD Marte  Primary Care Physician: Leif Townsend MD  Principal Problem:Pneumonia due to COVID-19 virus    Patient information was obtained from ER records.     Inpatient consult to Cardiology  Consult performed by: CLIFFORD Cabello  Consult ordered by: KYREE Crabtree  Reason for consult: bradycardia         Subjective:     Chief Complaint:  SOB     HPI:   Mrs. Talley is a 72 yo F with Hx of HTN, DM, HLD, who presents for evaluation of SOB. Patient is COVID+.  Cardiology consulted for bradycardia with HR in the 30's-40's.   Patient not on an AV node blocking agents at home or during admit.   Patient with no symptoms    Chart reviewed and plan formulated. Not examined due to COVID    Past Medical History:   Diagnosis Date    Arthritis     Diabetes     Gout     HTN (hypertension)     Neuropathy     Obese        Past Surgical History:   Procedure Laterality Date    UMBILICAL HERNIA REPAIR         Review of patient's allergies indicates:  No Known Allergies    No current facility-administered medications on file prior to encounter.      Current Outpatient Medications on File Prior to Encounter   Medication Sig    atorvastatin (LIPITOR) 40 MG tablet atorvastatin 40 mg tablet   Take 1 tablet by mouth daily.    baclofen (LIORESAL) 10 MG tablet Take 1 tablet (10 mg total) by mouth 3 (three) times daily.    ergocalciferol (ERGOCALCIFEROL) 50,000 unit Cap Take 1 capsule (50,000 Units total) by mouth every 7 days.    furosemide (LASIX) 40 MG tablet furosemide 40 mg tablet   Take 1 tablet by mouth daily.    gabapentin (NEURONTIN) 300 MG capsule Take 1 capsule (300 mg total) by mouth 3 (three) times daily.    losartan-hydrochlorothiazide 50-12.5 mg  (HYZAAR) 50-12.5 mg per tablet Take 1 tablet by mouth once daily.    metformin (GLUCOPHAGE) 1000 MG tablet Take 1,000 mg by mouth 2 (two) times daily with meals.    ondansetron (ZOFRAN) 4 MG tablet Take 1 tablet (4 mg total) by mouth every 6 (six) hours.    oxyCODONE (OXYCONTIN) 10 mg 12 hr tablet Take 1 tablet (10 mg total) by mouth every 12 (twelve) hours as needed for Pain.    PREVNAR 13, PF, 0.5 mL Syrg     pulse oximeter (PULSE OXIMETER) device Use twice daily at 8 AM and 3 PM and record the value in MyChart as directed.    pulse oximeter (PULSE OXIMETER) device by Apply Externally route 2 (two) times a day. Use twice daily at 8 AM and 3 PM and record the value in MyChart as directed.    tobramycin sulfate 0.3% (TOBREX) 0.3 % ophthalmic solution tobramycin 0.3 % eye drops     Family History     Problem Relation (Age of Onset)    Diabetes Sister    Hypertension Sister        Tobacco Use    Smoking status: Never Smoker    Smokeless tobacco: Never Used   Substance and Sexual Activity    Alcohol use: No    Drug use: No    Sexual activity: Not Currently     Partners: Male     Birth control/protection: None     ROS  Objective:     Vital Signs (Most Recent):  Temp: 97.2 °F (36.2 °C) (08/03/20 0748)  Pulse: (!) 48 (08/03/20 0748)  Resp: 18 (08/03/20 0748)  BP: (!) 128/58 (08/03/20 0748)  SpO2: 99 % (08/03/20 0748) Vital Signs (24h Range):  Temp:  [97.2 °F (36.2 °C)-97.8 °F (36.6 °C)] 97.2 °F (36.2 °C)  Pulse:  [39-88] 48  Resp:  [16-19] 18  SpO2:  [96 %-100 %] 99 %  BP: (121-147)/(58-80) 128/58     Weight: 94.4 kg (208 lb 1.8 oz)  Body mass index is 34.63 kg/m².    SpO2: 99 %  O2 Device (Oxygen Therapy): nasal cannula      Intake/Output Summary (Last 24 hours) at 8/3/2020 0906  Last data filed at 8/3/2020 0406  Gross per 24 hour   Intake 860 ml   Output 901 ml   Net -41 ml       Lines/Drains/Airways     Drain                 Urethral Catheter 07/29/20 0651 Latex 16 Fr. 5 days          Peripheral Intravenous  Line                 Peripheral IV - Single Lumen 07/28/20 2011 22 G Left Antecubital 5 days                Physical Exam    Significant Labs:   All pertinent lab results from the last 24 hours have been reviewed. and   Recent Lab Results       08/03/20  0618   08/03/20  0448   08/02/20  1923   08/02/20  1721   08/02/20  1543        Albumin 2.1       2.3     Alkaline Phosphatase 69       71     Allens Test       Pass       ALT 15       17     Anion Gap 8       13     AST 16       24     BILIRUBIN TOTAL 0.4  Comment:  For infants and newborns, interpretation of results should be based  on gestational age, weight and in agreement with clinical  observations.  Premature Infant recommended reference ranges:  Up to 24 hours.............<8.0 mg/dL  Up to 48 hours............<12.0 mg/dL  3-5 days..................<15.0 mg/dL  6-29 days.................<15.0 mg/dL         0.4  Comment:  For infants and newborns, interpretation of results should be based  on gestational age, weight and in agreement with clinical  observations.  Premature Infant recommended reference ranges:  Up to 24 hours.............<8.0 mg/dL  Up to 48 hours............<12.0 mg/dL  3-5 days..................<15.0 mg/dL  6-29 days.................<15.0 mg/dL       Site       RR       BUN, Bld 16       16     Calcium 9.1       9.4     Chloride 112       115     CO2 21       13     Creatinine 0.8       0.9     CRP 32.8             DelSys       Nasal Can       eGFR if  >60       >60     eGFR if non  >60  Comment:  Calculation used to obtain the estimated glomerular filtration  rate (eGFR) is the CKD-EPI equation.          >60  Comment:  Calculation used to obtain the estimated glomerular filtration  rate (eGFR) is the CKD-EPI equation.        Flow       2.5       Glucose 102       148     Mode       SPONT       POC BE       -5       POC Glucose       170       POC HCO3       19.3       POC Hematocrit       33       POC Ionized  Calcium       1.42       POC PCO2       30.4       POC PH       7.411       POC PO2       78       POC Potassium       3.7       POC SATURATED O2       96       POC Sodium       143       POCT Glucose   110 175         Potassium 3.9       4.3     PROTEIN TOTAL 6.4       7.3     Sample       ARTERIAL       Sodium 141       141                      08/02/20  1222        Albumin       Alkaline Phosphatase       Allens Test       ALT       Anion Gap       AST       BILIRUBIN TOTAL       Site       BUN, Bld       Calcium       Chloride       CO2       Creatinine       CRP       DelSys       eGFR if        eGFR if non        Flow       Glucose       Mode       POC BE       POC Glucose       POC HCO3       POC Hematocrit       POC Ionized Calcium       POC PCO2       POC PH       POC PO2       POC Potassium       POC SATURATED O2       POC Sodium       POCT Glucose 110     Potassium       PROTEIN TOTAL       Sample       Sodium               Assessment and Plan:     * Pneumonia due to COVID-19 virus  Being addressed by primary team     Bradycardia  Patient currently asymptomatic.   Bradycardia possibly from Covid infection   Recommend atropine PRN if patient becomes symptomatic.   No plans at this time         VTE Risk Mitigation (From admission, onward)         Ordered     enoxaparin injection 40 mg  Every 24 hours      08/02/20 1444              Chart reviewed. Patient examined by Dr. Remy and agrees with plan that has been outlined.     Thank you for your consult. I will sign off. Please contact us if you have any additional questions.    Alba Moss, ADOREP  Cardiology   [facility]

## 2020-08-03 NOTE — PLAN OF CARE
POC discussed with patient, verbalized understanding.   Sinus tre on monitor.   VSS. AAO X 3.  Requires frequent reorienting to situation. Answers questions appropriately but has periods of confusion   Voids per hammonds catheter.   Turns with assistance in bed.   Fall precautions in place.   Side rails up X2.    Call bell on, working and within reach.   Bed locked in low position.   Remains free from falls/injuries.   Denies needs at this time.   Will continue to monitor.

## 2020-08-03 NOTE — PT/OT/SLP PROGRESS
Physical Therapy  Treatment    Dilia Talley   MRN: [id number]   Admitting Diagnosis: Pneumonia due to COVID-19 virus    PT Received On: 08/03/20  PT Start Time: 1030     PT Stop Time: 1100    PT Total Time (min): 30 min       Billable Minutes:  Gait Training 15 and Therapeutic Exercise 15    Treatment Type: Treatment  PT/PTA: PT     PTA Visit Number: 0       General Precautions: Standard, airborne, fall, contact, droplet  Orthopedic Precautions: N/A   Braces: N/A         Subjective:  Communicated with NURSE MALONEY AND Epic CHART REVIEW  prior to session.   PT AGREED TO TX     Pain/Comfort  Pain Rating 1: 4/10  Location - Side 1: Left  Location 1: knee  Pain Rating Post-Intervention 1: 4/10    Objective:   Patient found with: peripheral IV, telemetry, oxygen    Functional Mobility:  PT MET IN RM SUP.SIT EOB WITH MAX A . PT SCOOTED TO EOB AND STOOD WITH RW AND MOD A. PT GT TRAINED WITH STEP TO GT AND MIN A WITH CUES FOR RW SAFETY AND O2 IN TOW X 20' PT GT TRAINED AND T/F TO CHAIR WITH RW AND MODA. PT LEFT SEATED IN CHAIR FOR REST. PT COMPLETED AP, TKE, AND MIP. PT LEFT SEATED IN CHAIR WITH ALL NEEDS MET AND NURSE AWARE TO GET JELLO FOR PT AS PT REQUESTED A SNACK.     AM-PAC 6 CLICK MOBILITY  How much help from another person does this patient currently need?   1 = Unable, Total/Dependent Assistance  2 = A lot, Maximum/Moderate Assistance  3 = A little, Minimum/Contact Guard/Supervision  4 = None, Modified [place]/Independent    Turning over in bed (including adjusting bedclothes, sheets and blankets)?: 2  Sitting down on and standing up from a chair with arms (e.g., wheelchair, bedside commode, etc.): 2  Moving from lying on back to sitting on the side of the bed?: 2  Moving to and from a bed to a chair (including a wheelchair)?: 2  Need to walk in hospital room?: 2  Climbing 3-5 steps with a railing?: 1  Basic Mobility Total Score: 11    AM-PAC Raw Score CMS G-Code Modifier Level of Impairment Assistance   6 CN  100% Total / Unable   7 - 9 CM 80 - 100% Maximal Assist   10 - 14 CL 60 - 80% Moderate Assist   15 - 19 CK 40 - 60% Moderate Assist   20 - 22 CJ 20 - 40% Minimal Assist   23 CI 1-20% SBA / CGA   24 CH 0% Independent/ Mod I     Patient left up in chair with call button in reach and chair alarm on.    Assessment:  PT TIFFANY TX WITH INC TIME TO COMPLETE TASKS.     Rehab identified problem list/impairments: Rehab identified problem list/impairments: weakness, impaired endurance, impaired self care skills, impaired functional mobilty, gait instability, impaired balance, pain, decreased lower extremity function, decreased upper extremity function, decreased ROM    Rehab potential is good.    Activity tolerance: Fair    Discharge recommendations: Discharge Facility/Level of Care Needs: nursing facility, skilled     Barriers to discharge:      Equipment recommendations: Equipment Needed After Discharge: (TBD)     GOALS:   Multidisciplinary Problems     Physical Therapy Goals        Problem: Physical Therapy Goal    Goal Priority Disciplines Outcome Goal Variances Interventions   Physical Therapy Goal     PT, PT/OT Ongoing, Progressing     Description: 1. Patient will perform supine to/from sit cga  2. Patient will perform sit to/from stand with RW cga  3. Patient will ambulate 50ft RW min a                   PLAN:    Patient to be seen 5 x/week  to address the above listed problems via gait training, therapeutic activities, therapeutic exercises  Plan of Care expires: 08/05/20  Plan of Care reviewed with: patient         Molly Servando, PT  08/03/2020

## 2020-08-03 NOTE — PLAN OF CARE
POC discussed with patient, verbalized understanding.   SB-SR on monitor.   VSS. AAO X 3.  Requires frequent reorienting to situation. Answers questions appropriately but has periods of confusion   Voids per hammonds catheter.   Turns with assistance in bed.   Fall precautions in place.   Side rails up X2.    Call bell on, working and within reach.   Bed locked in low position.   Remains free from falls/injuries.   Denies needs at this time.   Will continue to monitor.

## 2020-08-03 NOTE — SUBJECTIVE & OBJECTIVE
Past Medical History:   Diagnosis Date    Arthritis     Diabetes     Gout     HTN (hypertension)     Neuropathy     Obese        Past Surgical History:   Procedure Laterality Date    UMBILICAL HERNIA REPAIR         Review of patient's allergies indicates:  No Known Allergies    No current facility-administered medications on file prior to encounter.      Current Outpatient Medications on File Prior to Encounter   Medication Sig    atorvastatin (LIPITOR) 40 MG tablet atorvastatin 40 mg tablet   Take 1 tablet by mouth daily.    baclofen (LIORESAL) 10 MG tablet Take 1 tablet (10 mg total) by mouth 3 (three) times daily.    ergocalciferol (ERGOCALCIFEROL) 50,000 unit Cap Take 1 capsule (50,000 Units total) by mouth every 7 days.    furosemide (LASIX) 40 MG tablet furosemide 40 mg tablet   Take 1 tablet by mouth daily.    gabapentin (NEURONTIN) 300 MG capsule Take 1 capsule (300 mg total) by mouth 3 (three) times daily.    losartan-hydrochlorothiazide 50-12.5 mg (HYZAAR) 50-12.5 mg per tablet Take 1 tablet by mouth once daily.    metformin (GLUCOPHAGE) 1000 MG tablet Take 1,000 mg by mouth 2 (two) times daily with meals.    ondansetron (ZOFRAN) 4 MG tablet Take 1 tablet (4 mg total) by mouth every 6 (six) hours.    oxyCODONE (OXYCONTIN) 10 mg 12 hr tablet Take 1 tablet (10 mg total) by mouth every 12 (twelve) hours as needed for Pain.    PREVNAR 13, PF, 0.5 mL Syrg     pulse oximeter (PULSE OXIMETER) device Use twice daily at 8 AM and 3 PM and record the value in MyChart as directed.    pulse oximeter (PULSE OXIMETER) device by Apply Externally route 2 (two) times a day. Use twice daily at 8 AM and 3 PM and record the value in MyChart as directed.    tobramycin sulfate 0.3% (TOBREX) 0.3 % ophthalmic solution tobramycin 0.3 % eye drops     Family History     Problem Relation (Age of Onset)    Diabetes Sister    Hypertension Sister        Tobacco Use    Smoking status: Never Smoker    Smokeless  tobacco: Never Used   Substance and Sexual Activity    Alcohol use: No    Drug use: No    Sexual activity: Not Currently     Partners: Male     Birth control/protection: None     ROS  Objective:     Vital Signs (Most Recent):  Temp: 97.2 °F (36.2 °C) (08/03/20 0748)  Pulse: (!) 48 (08/03/20 0748)  Resp: 18 (08/03/20 0748)  BP: (!) 128/58 (08/03/20 0748)  SpO2: 99 % (08/03/20 0748) Vital Signs (24h Range):  Temp:  [97.2 °F (36.2 °C)-97.8 °F (36.6 °C)] 97.2 °F (36.2 °C)  Pulse:  [39-88] 48  Resp:  [16-19] 18  SpO2:  [96 %-100 %] 99 %  BP: (121-147)/(58-80) 128/58     Weight: 94.4 kg (208 lb 1.8 oz)  Body mass index is 34.63 kg/m².    SpO2: 99 %  O2 Device (Oxygen Therapy): nasal cannula      Intake/Output Summary (Last 24 hours) at 8/3/2020 0906  Last data filed at 8/3/2020 0406  Gross per 24 hour   Intake 860 ml   Output 901 ml   Net -41 ml       Lines/Drains/Airways     Drain                 Urethral Catheter 07/29/20 0651 Latex 16 Fr. 5 days          Peripheral Intravenous Line                 Peripheral IV - Single Lumen 07/28/20 2011 22 G Left Antecubital 5 days                Physical Exam    Significant Labs:   All pertinent lab results from the last 24 hours have been reviewed. and   Recent Lab Results       08/03/20  0618   08/03/20  0448   08/02/20  1923   08/02/20  1721   08/02/20  1543        Albumin 2.1       2.3     Alkaline Phosphatase 69       71     Allens Test       Pass       ALT 15       17     Anion Gap 8       13     AST 16       24     BILIRUBIN TOTAL 0.4  Comment:  For infants and newborns, interpretation of results should be based  on gestational age, weight and in agreement with clinical  observations.  Premature Infant recommended reference ranges:  Up to 24 hours.............<8.0 mg/dL  Up to 48 hours............<12.0 mg/dL  3-5 days..................<15.0 mg/dL  6-29 days.................<15.0 mg/dL         0.4  Comment:  For infants and newborns, interpretation of results should be  based  on gestational age, weight and in agreement with clinical  observations.  Premature Infant recommended reference ranges:  Up to 24 hours.............<8.0 mg/dL  Up to 48 hours............<12.0 mg/dL  3-5 days..................<15.0 mg/dL  6-29 days.................<15.0 mg/dL       Site       RR       BUN, Bld 16       16     Calcium 9.1       9.4     Chloride 112       115     CO2 21       13     Creatinine 0.8       0.9     CRP 32.8             DelSys       Nasal Can       eGFR if  >60       >60     eGFR if non  >60  Comment:  Calculation used to obtain the estimated glomerular filtration  rate (eGFR) is the CKD-EPI equation.          >60  Comment:  Calculation used to obtain the estimated glomerular filtration  rate (eGFR) is the CKD-EPI equation.        Flow       2.5       Glucose 102       148     Mode       SPONT       POC BE       -5       POC Glucose       170       POC HCO3       19.3       POC Hematocrit       33       POC Ionized Calcium       1.42       POC PCO2       30.4       POC PH       7.411       POC PO2       78       POC Potassium       3.7       POC SATURATED O2       96       POC Sodium       143       POCT Glucose   110 175         Potassium 3.9       4.3     PROTEIN TOTAL 6.4       7.3     Sample       ARTERIAL       Sodium 141       141                      08/02/20  1222        Albumin       Alkaline Phosphatase       Allens Test       ALT       Anion Gap       AST       BILIRUBIN TOTAL       Site       BUN, Bld       Calcium       Chloride       CO2       Creatinine       CRP       DelSys       eGFR if        eGFR if non        Flow       Glucose       Mode       POC BE       POC Glucose       POC HCO3       POC Hematocrit       POC Ionized Calcium       POC PCO2       POC PH       POC PO2       POC Potassium       POC SATURATED O2       POC Sodium       POCT Glucose 110     Potassium       PROTEIN TOTAL       Sample        Sodium

## 2020-08-04 VITALS
HEART RATE: 76 BPM | BODY MASS INDEX: 34.34 KG/M2 | RESPIRATION RATE: 19 BRPM | WEIGHT: 206.13 LBS | OXYGEN SATURATION: 92 % | DIASTOLIC BLOOD PRESSURE: 64 MMHG | SYSTOLIC BLOOD PRESSURE: 121 MMHG | TEMPERATURE: 98 F | HEIGHT: 65 IN

## 2020-08-04 PROBLEM — N17.9 AKI (ACUTE KIDNEY INJURY): Status: RESOLVED | Noted: 2020-07-28 | Resolved: 2020-08-04

## 2020-08-04 LAB
POCT GLUCOSE: 105 MG/DL (ref 70–110)
POCT GLUCOSE: 116 MG/DL (ref 70–110)

## 2020-08-04 PROCEDURE — 63600175 PHARM REV CODE 636 W HCPCS: Mod: HCWC | Performed by: INTERNAL MEDICINE

## 2020-08-04 PROCEDURE — 25000003 PHARM REV CODE 250: Mod: HCWC | Performed by: HOSPITALIST

## 2020-08-04 PROCEDURE — 99233 PR SUBSEQUENT HOSPITAL CARE,LEVL III: ICD-10-PCS | Mod: HCWC,,, | Performed by: INTERNAL MEDICINE

## 2020-08-04 PROCEDURE — 97110 THERAPEUTIC EXERCISES: CPT | Mod: HCWC

## 2020-08-04 PROCEDURE — 99233 SBSQ HOSP IP/OBS HIGH 50: CPT | Mod: HCWC,,, | Performed by: INTERNAL MEDICINE

## 2020-08-04 PROCEDURE — 25000003 PHARM REV CODE 250: Mod: HCWC | Performed by: INTERNAL MEDICINE

## 2020-08-04 PROCEDURE — 97530 THERAPEUTIC ACTIVITIES: CPT | Mod: HCWC

## 2020-08-04 PROCEDURE — 94761 N-INVAS EAR/PLS OXIMETRY MLT: CPT | Mod: HCWC

## 2020-08-04 RX ORDER — ENOXAPARIN SODIUM 100 MG/ML
40 INJECTION SUBCUTANEOUS DAILY
Start: 2020-08-04 | End: 2020-11-12

## 2020-08-04 RX ORDER — DEXAMETHASONE 6 MG/1
6 TABLET ORAL DAILY
Qty: 10 TABLET | Refills: 0
Start: 2020-08-05 | End: 2020-08-15

## 2020-08-04 RX ORDER — LANOLIN ALCOHOL/MO/W.PET/CERES
100 CREAM (GRAM) TOPICAL DAILY
Start: 2020-08-05 | End: 2020-11-12

## 2020-08-04 RX ADMIN — Medication 100 MG: at 09:08

## 2020-08-04 RX ADMIN — HYDROXYZINE PAMOATE 25 MG: 25 CAPSULE ORAL at 02:08

## 2020-08-04 RX ADMIN — ATORVASTATIN CALCIUM 40 MG: 40 TABLET, FILM COATED ORAL at 09:08

## 2020-08-04 RX ADMIN — ZINC SULFATE 220 MG (50 MG) CAPSULE 220 MG: CAPSULE at 09:08

## 2020-08-04 RX ADMIN — NYSTATIN 500000 UNITS: 100000 SUSPENSION ORAL at 02:08

## 2020-08-04 RX ADMIN — MUPIROCIN: 20 OINTMENT TOPICAL at 09:08

## 2020-08-04 RX ADMIN — OXYCODONE HYDROCHLORIDE AND ACETAMINOPHEN 500 MG: 500 TABLET ORAL at 09:08

## 2020-08-04 RX ADMIN — DEXAMETHASONE 6 MG: 4 TABLET ORAL at 09:08

## 2020-08-04 RX ADMIN — NYSTATIN 500000 UNITS: 100000 SUSPENSION ORAL at 09:08

## 2020-08-04 NOTE — PROGRESS NOTES
[facility] Hospital Medicine  Progress Note    Patient Name: Dilia Talley  MRN: [id number]  Patient Class: IP- Inpatient   Admission Date: 7/28/2020  Length of Stay: 6 days  Attending Physician: Sonu Colón MD  Primary Care Provider: Leif Townsend MD        Subjective:     Principal Problem:Pneumonia due to COVID-19 virus        HPI:  Mrs. Talley is a 72 yo F with Hx of HTN, DM, HLD, who presents for evaluation of SOB. Patient is COVID+ and has been seen in the ED  4 times in the last two weeks for similar complaints. She is on home oxygen. Today she reports worsening of her SOB, associated with fever, chills, and dry cough. She was unable to take care of herself and seeked help. In ED her labs were noted to have new onset SAMMI with low BP and CXR with worsening infiltration. Patient is admitted for further management.                  Overview/Hospital Course:  7/29 pt was seen and examined at bedside  . She is aao x 2 in nad . She is pleasant confuse . She follow commands . She is  Complaining of SOB .  The case was discussed with Her grandson William Talley iv ( He Voice is the POA, Meera number [phone])  7/30 Pt was seen and examined at bedside . She Is more alert  Today . She is on 2 lt by nc . The Case was D/w William Talley iv ( He Voice is the POA, Meera number [phone]) and REFUSED  REMDESIVIR Tx .   7/31 Pt was seen and examined at bedside . She is aao x 2  In nad . She has a very poor appetite . She is on 2 lt by nasal canula   8/1 Pt was seen and examined at bedside . She is aao x 3 in nad . She has some confusion on and off . There e was no acute event overnight . She is on 3 lt  By nc   8/2 pt is aao x 2 in nad . The CO2 is 13 . TheCBG has been < than 250 , There is No  Significant anion gap . We will don  A ABG and d./c the D5 1/2 ns . She has some  sinus bradycardia on and off  .   8/3 - Patient Evaluated by Cards recommending no intervention related to Bradycardia. Patient bouts  asymptomatic. No events - Plan for SNF in progress.    Interval History: Stable. Await SNF    Review of Systems   Constitutional: Positive for appetite change.   HENT: Negative.    Eyes: Negative.    Respiratory: Positive for cough.    Gastrointestinal: Positive for nausea.   Endocrine: Negative.    Genitourinary: Negative.    Musculoskeletal: Negative.    Skin: Negative.    Allergic/Immunologic: Negative.    Psychiatric/Behavioral: Positive for decreased concentration.     Objective:     Vital Signs (Most Recent):  Temp: 97.7 °F (36.5 °C) (08/03/20 1959)  Pulse: 98 (08/03/20 1959)  Resp: 16 (08/03/20 1959)  BP: (!) 161/79 (08/03/20 1959)  SpO2: 96 % (08/03/20 1959) Vital Signs (24h Range):  Temp:  [97.2 °F (36.2 °C)-97.7 °F (36.5 °C)] 97.7 °F (36.5 °C)  Pulse:  [39-98] 98  Resp:  [16-18] 16  SpO2:  [94 %-100 %] 96 %  BP: (116-161)/(58-80) 161/79     Weight: 94.4 kg (208 lb 1.8 oz)  Body mass index is 34.63 kg/m².    Intake/Output Summary (Last 24 hours) at 8/3/2020 2137  Last data filed at 8/3/2020 1600  Gross per 24 hour   Intake 120 ml   Output 600 ml   Net -480 ml      Physical Exam  Vitals signs and nursing note reviewed.   Constitutional:       Appearance: She is well-developed.   HENT:      Head: Normocephalic and atraumatic.      Right Ear: External ear normal.      Left Ear: External ear normal.      Nose: Nose normal.   Eyes:      Conjunctiva/sclera: Conjunctivae normal.      Pupils: Pupils are equal, round, and reactive to light.   Neck:      Musculoskeletal: Normal range of motion and neck supple.      Thyroid: No thyromegaly.      Vascular: No JVD.   Cardiovascular:      Rate and Rhythm: Normal rate and regular rhythm.      Heart sounds: Normal heart sounds. No murmur. No friction rub. No gallop.    Pulmonary:      Effort: Pulmonary effort is normal. No respiratory distress.      Breath sounds: No stridor. No wheezing or rales.   Chest:      Chest wall: No tenderness.   Abdominal:      General: Bowel  sounds are normal. There is no distension.      Palpations: Abdomen is soft. There is no mass.      Tenderness: There is no abdominal tenderness. There is no guarding or rebound.   Genitourinary:     Vagina: Normal. No vaginal discharge.   Musculoskeletal: Normal range of motion.         General: Swelling present. No deformity.   Lymphadenopathy:      Cervical: No cervical adenopathy.   Skin:     General: Skin is warm.      Capillary Refill: Capillary refill takes less than 2 seconds.      Findings: No erythema or rash.   Neurological:      Mental Status: She is alert and oriented to person, place, and time.      Cranial Nerves: No cranial nerve deficit.      Sensory: No sensory deficit.      Deep Tendon Reflexes: Reflexes normal.   Psychiatric:         Behavior: Behavior normal.         Significant Labs: All pertinent labs within the past 24 hours have been reviewed.    Significant Imaging: I have reviewed all pertinent imaging results/findings within the past 24 hours.      Assessment/Plan:      * Pneumonia due to COVID-19 virus  - COVID-19 testing   - Infection Control notified     - Isolation:   - Airborne, Contact and Droplet Precautions  - Cohort patients into COVID units  - N95 mask, wear eye protection  - 20 second hand hygiene              - Limit visitors per hospital policy              - Consolidating lab draws, nursing care, provider visits, and interventions    - Diagnostics: (leukopenia, hyponatremia, hyperferritinemia, elevated troponin, elevated d-dimer, age, and comorbidities are significant predictors of poor clinical outcome)  CBC, CMP, Ferritin, CRP, LDH, BNP, Troponin and Portable CXR    - Management:  Supplemental O2 to maintain SpO2 >92%  Continuous/intermittent Pulse Ox  Albuterol treatment PRN    -Cont zinc , vit c  -cont  decadron  -The case was discussed with Her grandson William Talley elmer ( He Voice is the POA, Meera number [phone]). He Refused Remdesevir Tx   -s/p Doxycycline and  Ceftriaxone  -wean off o2   -SNF placement                      Bradycardia  8/3  Cards no intervention  Atropine for symptomatic events  Hold Rate inhibiting meds      SAMMI (acute kidney injury)  Possible Pre Renal from poor oral intake vs Intrinsic 2/2 COVID   IVF  Monitor   Avoid nephrotoxic meds   Resolved       Severe sepsis  S/p IVF, Cultures and ABX   Monitor       Diabetes mellitus type 2 with neurological manifestations  Hba1c 6  ISS  Diabetic Diet   monitor        VTE Risk Mitigation (From admission, onward)         Ordered     enoxaparin injection 40 mg  Every 24 hours      08/02/20 0063                      Sonu Colón MD  Department of Hospital Medicine   [facility]

## 2020-08-04 NOTE — PT/OT/SLP PROGRESS
Physical Therapy  Treatment    Dilia Talley   MRN: [id number]   Admitting Diagnosis: Pneumonia due to COVID-19 virus    PT Received On: 08/04/20  PT Start Time: 0950     PT Stop Time: 1015    PT Total Time (min): 25 min       Billable Minutes:  Therapeutic Activity 15 and Therapeutic Exercise 10    Treatment Type: Treatment  PT/PTA: PT     PTA Visit Number: 0       General Precautions: Standard, airborne, fall, contact, droplet  Orthopedic Precautions: N/A   Braces: N/A    Subjective:  Communicated with NURSE LAIRD prior to session.  Pain/Comfort  Pain Rating 1: 9/10  Location - Orientation 1: lower  Location 1: back  Pain Addressed 1: Reposition, Cessation of Activity    Objective:   Patient found with: peripheral IV, telemetry, oxygen    Functional Mobility:  Therapeutic Activities and Exercises:  PT FOUND SUPINE IN BED UPON ARRIVAL, CONFUSED BUT WILLING TO PARTICIPATE WITH ENCOURAGEMENT, C/O BACK PAIN AND BEING COLD, SUP>SIT AND SEATED SCOOT TO EOB WITH MAXA AND EXTRA TIME, VERBAL AND TACTILE CUES TO STAY ON TASK, SIT>STAND WITH MODA, PT TOOK SEVERAL SMALL STEPS WITH HHA/MODA TO TF BED TO CHAIR, CUES FOR UPRIGHT POSTURE, P DYNAMIC BALANCE, PT EDUCATED IN AND PERFORMED BLE THEREX X 15 REPS AROM WITH REST, PT ENCOURAGED TO INCREASE TIME OOB IN CHAIR    AM-PAC 6 CLICK MOBILITY  How much help from another person does this patient currently need?   1 = Unable, Total/Dependent Assistance  2 = A lot, Maximum/Moderate Assistance  3 = A little, Minimum/Contact Guard/Supervision  4 = None, Modified [place]/Independent    Turning over in bed (including adjusting bedclothes, sheets and blankets)?: 2  Sitting down on and standing up from a chair with arms (e.g., wheelchair, bedside commode, etc.): 2  Moving from lying on back to sitting on the side of the bed?: 2  Moving to and from a bed to a chair (including a wheelchair)?: 2  Need to walk in hospital room?: 1  Climbing 3-5 steps with a railing?: 1  Basic Mobility Total  Score: 10    AM-PAC Raw Score CMS G-Code Modifier Level of Impairment Assistance   6 % Total / Unable   7 - 9 CM 80 - 100% Maximal Assist   10 - 14 CL 60 - 80% Moderate Assist   15 - 19 CK 40 - 60% Moderate Assist   20 - 22 CJ 20 - 40% Minimal Assist   23 CI 1-20% SBA / CGA   24 CH 0% Independent/ Mod I     Patient left up in chair with all lines intact, call button in reach, chair alarm on and NURSE notified.    Assessment:  Dilia Talley is a 73 y.o. female with a medical diagnosis of Pneumonia due to COVID-19 virus and presents with IMPAIRED FUNCTIONAL MOBILITY. PT WILL BENEFIT FROM CONT. SKILLED P.T. TO ADDRESS IMPAIRMENTS    Rehab identified problem list/impairments: Rehab identified problem list/impairments: weakness, impaired endurance, impaired balance, gait instability, impaired functional mobilty, decreased coordination    Rehab potential is fair.    Activity tolerance: Fair    Discharge recommendations: Discharge Facility/Level of Care Needs: nursing facility, skilled     Barriers to discharge:      Equipment recommendations: Equipment Needed After Discharge: walker, rolling     GOALS:   Multidisciplinary Problems     Physical Therapy Goals        Problem: Physical Therapy Goal    Goal Priority Disciplines Outcome Goal Variances Interventions   Physical Therapy Goal     PT, PT/OT Ongoing, Progressing     Description: 1. Patient will perform supine to/from sit cga  2. Patient will perform sit to/from stand with RW cga  3. Patient will ambulate 50ft RW min a                   PLAN:    Patient to be seen 5 x/week  to address the above listed problems via gait training, therapeutic activities, therapeutic exercises  Plan of Care expires: 08/05/20  Plan of Care reviewed with: patient    Lesly Srinivasa, PT  08/04/2020

## 2020-08-04 NOTE — ASSESSMENT & PLAN NOTE
- COVID-19 testing   - Infection Control notified     - Isolation:   - Airborne, Contact and Droplet Precautions  - Cohort patients into COVID units  - N95 mask, wear eye protection  - 20 second hand hygiene              - Limit visitors per hospital policy              - Consolidating lab draws, nursing care, provider visits, and interventions    - Diagnostics: (leukopenia, hyponatremia, hyperferritinemia, elevated troponin, elevated d-dimer, age, and comorbidities are significant predictors of poor clinical outcome)  CBC, CMP, Ferritin, CRP, LDH, BNP, Troponin and Portable CXR    - Management:  Supplemental O2 to maintain SpO2 >92%  Continuous/intermittent Pulse Ox  Albuterol treatment PRN    -Cont zinc , vit c  -cont  decadron  -The case was discussed with Her grandson William Talley elmer ( He Voice is the POA, Meera number [phone]). He Refused Remdesevir Tx   -s/p Doxycycline and Ceftriaxone  -wean off o2   -SNF placement

## 2020-08-04 NOTE — SUBJECTIVE & OBJECTIVE
Review of Systems   Reason unable to perform ROS: not obtained due to COVID-19 infection.     Objective:     Vital Signs (Most Recent):  Temp: 97.6 °F (36.4 °C) (08/04/20 0733)  Pulse: (!) 55 (08/04/20 0900)  Resp: 16 (08/04/20 0733)  BP: 136/82 (08/04/20 0733)  SpO2: 95 % (08/04/20 1000) Vital Signs (24h Range):  Temp:  [97.5 °F (36.4 °C)-97.7 °F (36.5 °C)] 97.6 °F (36.4 °C)  Pulse:  [41-98] 55  Resp:  [16-20] 16  SpO2:  [94 %-96 %] 95 %  BP: (116-164)/(63-82) 136/82     Weight: 93.5 kg (206 lb 2.1 oz)  Body mass index is 34.3 kg/m².     SpO2: 95 %  O2 Device (Oxygen Therapy): room air      Intake/Output Summary (Last 24 hours) at 8/4/2020 1112  Last data filed at 8/4/2020 0452  Gross per 24 hour   Intake --   Output 600 ml   Net -600 ml       Lines/Drains/Airways     Drain                 Urethral Catheter 07/29/20 0651 Latex 16 Fr. 6 days          Peripheral Intravenous Line                 Peripheral IV - Single Lumen 07/28/20 2011 22 G Left Antecubital 6 days                Physical Exam    Significant Labs:   CMP   Recent Labs   Lab 08/02/20  1543 08/03/20  0618    141   K 4.3 3.9   * 112*   CO2 13* 21*   * 102   BUN 16 16   CREATININE 0.9 0.8   CALCIUM 9.4 9.1   PROT 7.3 6.4   ALBUMIN 2.3* 2.1*   BILITOT 0.4 0.4   ALKPHOS 71 69   AST 24 16   ALT 17 15   ANIONGAP 13 8   ESTGFRAFRICA >60 >60   EGFRNONAA >60 >60   , CBC   Recent Labs   Lab 08/02/20  1721   HCT 33*   , Troponin No results for input(s): TROPONINI in the last 48 hours. and All pertinent lab results from the last 24 hours have been reviewed.    Significant Imaging: Echocardiogram: 2D echo with color flow doppler: No results found for this or any previous visit., EKG: Reviewed and X-Ray: CXR: X-Ray Chest 1 View (CXR): No results found for this visit on 07/28/20. and X-Ray Chest PA and Lateral (CXR): No results found for this visit on 07/28/20.

## 2020-08-04 NOTE — PLAN OF CARE
Discharge orders noted. Appropriate discharge paperwork faxed to [facility] via [employer]. Once orders are reviewed and accepted they will call with discharge transportation arrangements and # for report. Patient's family is going to sign paperwork at 3pm today,      Disposition: [facility] ( rm [phone])  # for report:[phone] ( Daphne or Angelito)     Transportation: [facility] to arrange         08/04/20 1233   Post-Acute Status   Post-Acute Authorization Placement   Post-Acute Placement Status Set-up Complete   Discharge Delays (!) Other         Discharge packet given to Jamison Castillo Nurse @ [phone]

## 2020-08-04 NOTE — PLAN OF CARE
"Plan of care reviewed with patient, pt unable to verbalize understanding.  Pt remains free from falls this shift, fall precautions in place.bed alarm set.   Pt remains free from skin breakdown, turn q2hr orders in.  AAOx3, disoriented to situation,NAD noted at this time.  BP (!) 164/73 (BP Location: Left arm, Patient Position: Lying)   Pulse (!) 53   Temp 97.7 °F (36.5 °C) (Oral)   Resp 20   Ht 5' 5" (1.651 m)   Wt 94.4 kg (208 lb 1.8 oz)   SpO2 96%   Breastfeeding No   BMI 34.63 kg/m²   PIV 22 G L AC  Pt remained afebrile.  3L O2 via NC  SB to NSR on the tele monitor  Blood glucose monitoring AC/HS.  Pt admitted for PNA due to COVID. Pop catheter in place for retention.  Pt currently resting comfortably in bed. Awaiting SNF placement  Hourly rounding complete. Bed in lowest position, side rails up, call light in reach.  Telesitter remains at the bedside due to intermittent confusion.  Will continue to monitor.  Problem: Fall Injury Risk  Goal: Absence of Fall and Fall-Related Injury  Outcome: Ongoing, Progressing     Problem: Adult Inpatient Plan of Care  Goal: Plan of Care Review  Outcome: Ongoing, Progressing  Goal: Patient-Specific Goal (Individualization)  Outcome: Ongoing, Progressing  Goal: Absence of Hospital-Acquired Illness or Injury  Outcome: Ongoing, Progressing  Goal: Optimal Comfort and Wellbeing  Outcome: Ongoing, Progressing  Goal: Readiness for Transition of Care  Outcome: Ongoing, Progressing  Goal: Rounds/Family Conference  Outcome: Ongoing, Progressing     Problem: Infection  Goal: Infection Symptom Resolution  Outcome: Ongoing, Progressing     Problem: Diabetes Comorbidity  Goal: Blood Glucose Level Within Desired Range  Outcome: Ongoing, Progressing     Problem: Adjustment to Illness (Sepsis/Septic Shock)  Goal: Optimal Coping  Outcome: Ongoing, Progressing     Problem: Bleeding (Sepsis/Septic Shock)  Goal: Absence of Bleeding  Outcome: Ongoing, Progressing     Problem: Glycemic " Control Impaired (Sepsis/Septic Shock)  Goal: Blood Glucose Level Within Desired Range  Outcome: Ongoing, Progressing     Problem: Hemodynamic Instability (Sepsis/Septic Shock)  Goal: Effective Tissue Perfusion  Outcome: Ongoing, Progressing     Problem: Infection (Sepsis/Septic Shock)  Goal: Absence of Infection Signs/Symptoms  Outcome: Ongoing, Progressing     Problem: Nutrition Impaired (Sepsis/Septic Shock)  Goal: Optimal Nutrition Intake  Outcome: Ongoing, Progressing     Problem: Respiratory Compromise (Sepsis/Septic Shock)  Goal: Effective Oxygenation and Ventilation  Outcome: Ongoing, Progressing     Problem: Electrolyte Imbalance (Acute Kidney Injury/Impairment)  Goal: Serum Electrolyte Balance  Outcome: Ongoing, Progressing     Problem: Fluid Imbalance (Acute Kidney Injury/Impairment)  Goal: Optimal Fluid Balance  Outcome: Ongoing, Progressing     Problem: Hematologic Alteration (Acute Kidney Injury/Impairment)  Goal: Hemoglobin, Hematocrit and Platelets Within Normal Range  Outcome: Ongoing, Progressing     Problem: Oral Intake Inadequate (Acute Kidney Injury/Impairment)  Goal: Optimal Nutrition Intake  Outcome: Ongoing, Progressing     Problem: Renal Function Impairment (Acute Kidney Injury/Impairment)  Goal: Effective Renal Function  Outcome: Ongoing, Progressing     Problem: Skin Injury Risk Increased  Goal: Skin Health and Integrity  Outcome: Ongoing, Progressing

## 2020-08-04 NOTE — HOSPITAL COURSE
8/4/2020-Chart reviewed. No reported acute CV events overnight per documentation. Pulse stable in upper 40's-50's.

## 2020-08-04 NOTE — PLAN OF CARE
[facility] has insurance authorization. Secure message sent to provider requesting dc orders if appropriate.       08/04/20 1057   Post-Acute Status   Post-Acute Authorization Placement   Post-Acute Placement Status Authorization Obtained   Discharge Delays (!) Other

## 2020-08-04 NOTE — NURSING
Report called to Mayuri @ [facility] review of all instructions and all questions answered  PIV and Telemetry removed pt kvng well, VSS, denies any discomfort and appears to be in no distress  Pt left floor via ambulance

## 2020-08-04 NOTE — SUBJECTIVE & OBJECTIVE
Interval History: Stable. Await SNF    Review of Systems   Constitutional: Positive for appetite change.   HENT: Negative.    Eyes: Negative.    Respiratory: Positive for cough.    Gastrointestinal: Positive for nausea.   Endocrine: Negative.    Genitourinary: Negative.    Musculoskeletal: Negative.    Skin: Negative.    Allergic/Immunologic: Negative.    Psychiatric/Behavioral: Positive for decreased concentration.     Objective:     Vital Signs (Most Recent):  Temp: 97.7 °F (36.5 °C) (08/03/20 1959)  Pulse: 98 (08/03/20 1959)  Resp: 16 (08/03/20 1959)  BP: (!) 161/79 (08/03/20 1959)  SpO2: 96 % (08/03/20 1959) Vital Signs (24h Range):  Temp:  [97.2 °F (36.2 °C)-97.7 °F (36.5 °C)] 97.7 °F (36.5 °C)  Pulse:  [39-98] 98  Resp:  [16-18] 16  SpO2:  [94 %-100 %] 96 %  BP: (116-161)/(58-80) 161/79     Weight: 94.4 kg (208 lb 1.8 oz)  Body mass index is 34.63 kg/m².    Intake/Output Summary (Last 24 hours) at 8/3/2020 2137  Last data filed at 8/3/2020 1600  Gross per 24 hour   Intake 120 ml   Output 600 ml   Net -480 ml      Physical Exam  Vitals signs and nursing note reviewed.   Constitutional:       Appearance: She is well-developed.   HENT:      Head: Normocephalic and atraumatic.      Right Ear: External ear normal.      Left Ear: External ear normal.      Nose: Nose normal.   Eyes:      Conjunctiva/sclera: Conjunctivae normal.      Pupils: Pupils are equal, round, and reactive to light.   Neck:      Musculoskeletal: Normal range of motion and neck supple.      Thyroid: No thyromegaly.      Vascular: No JVD.   Cardiovascular:      Rate and Rhythm: Normal rate and regular rhythm.      Heart sounds: Normal heart sounds. No murmur. No friction rub. No gallop.    Pulmonary:      Effort: Pulmonary effort is normal. No respiratory distress.      Breath sounds: No stridor. No wheezing or rales.   Chest:      Chest wall: No tenderness.   Abdominal:      General: Bowel sounds are normal. There is no distension.       Palpations: Abdomen is soft. There is no mass.      Tenderness: There is no abdominal tenderness. There is no guarding or rebound.   Genitourinary:     Vagina: Normal. No vaginal discharge.   Musculoskeletal: Normal range of motion.         General: Swelling present. No deformity.   Lymphadenopathy:      Cervical: No cervical adenopathy.   Skin:     General: Skin is warm.      Capillary Refill: Capillary refill takes less than 2 seconds.      Findings: No erythema or rash.   Neurological:      Mental Status: She is alert and oriented to person, place, and time.      Cranial Nerves: No cranial nerve deficit.      Sensory: No sensory deficit.      Deep Tendon Reflexes: Reflexes normal.   Psychiatric:         Behavior: Behavior normal.         Significant Labs: All pertinent labs within the past 24 hours have been reviewed.    Significant Imaging: I have reviewed all pertinent imaging results/findings within the past 24 hours.

## 2020-08-04 NOTE — ASSESSMENT & PLAN NOTE
Patient currently asymptomatic.   Bradycardia possibly from Covid infection   Recommend atropine PRN if patient becomes symptomatic.   No plans at this time     8/4/2020  -No CV complaints  -HR stable  -Continue to monitor for now

## 2020-08-04 NOTE — PROGRESS NOTES
[facility] -   Cardiology  Progress Note    Patient Name: Dilia Talley  MRN: [id number]  Admission Date: 7/28/2020  Hospital Length of Stay: 7 days  Code Status: No Order   Attending Physician: Sonu Colón MD   Primary Care Physician: Leif Townsend MD  Expected Discharge Date:   Principal Problem:Pneumonia due to COVID-19 virus    Subjective:   HPI:  Mrs. Talley is a 72 yo F with Hx of HTN, DM, HLD, who presents for evaluation of SOB. Patient is COVID+.  Cardiology consulted for bradycardia with HR in the 30's-40's.   Patient not on an AV node blocking agents at home or during admit.   Patient with no symptoms    Chart reviewed and plan formulated. Not examined due to COVID    Hospital Course:   8/4/2020-Chart reviewed. No reported acute CV events overnight per documentation. Pulse stable in upper 40's-50's.         Review of Systems   Reason unable to perform ROS: not obtained due to COVID-19 infection.     Objective:     Vital Signs (Most Recent):  Temp: 97.6 °F (36.4 °C) (08/04/20 0733)  Pulse: (!) 55 (08/04/20 0900)  Resp: 16 (08/04/20 0733)  BP: 136/82 (08/04/20 0733)  SpO2: 95 % (08/04/20 1000) Vital Signs (24h Range):  Temp:  [97.5 °F (36.4 °C)-97.7 °F (36.5 °C)] 97.6 °F (36.4 °C)  Pulse:  [41-98] 55  Resp:  [16-20] 16  SpO2:  [94 %-96 %] 95 %  BP: (116-164)/(63-82) 136/82     Weight: 93.5 kg (206 lb 2.1 oz)  Body mass index is 34.3 kg/m².     SpO2: 95 %  O2 Device (Oxygen Therapy): room air      Intake/Output Summary (Last 24 hours) at 8/4/2020 1112  Last data filed at 8/4/2020 0452  Gross per 24 hour   Intake --   Output 600 ml   Net -600 ml       Lines/Drains/Airways     Drain                 Urethral Catheter 07/29/20 0651 Latex 16 Fr. 6 days          Peripheral Intravenous Line                 Peripheral IV - Single Lumen 07/28/20 2011 22 G Left Antecubital 6 days                Physical Exam    Significant Labs:   CMP   Recent Labs   Lab 08/02/20  1543 08/03/20  0618    141   K  4.3 3.9   * 112*   CO2 13* 21*   * 102   BUN 16 16   CREATININE 0.9 0.8   CALCIUM 9.4 9.1   PROT 7.3 6.4   ALBUMIN 2.3* 2.1*   BILITOT 0.4 0.4   ALKPHOS 71 69   AST 24 16   ALT 17 15   ANIONGAP 13 8   ESTGFRAFRICA >60 >60   EGFRNONAA >60 >60   , CBC   Recent Labs   Lab 08/02/20  1721   HCT 33*   , Troponin No results for input(s): TROPONINI in the last 48 hours. and All pertinent lab results from the last 24 hours have been reviewed.    Significant Imaging: Echocardiogram: 2D echo with color flow doppler: No results found for this or any previous visit., EKG: Reviewed and X-Ray: CXR: X-Ray Chest 1 View (CXR): No results found for this visit on 07/28/20. and X-Ray Chest PA and Lateral (CXR): No results found for this visit on 07/28/20.    Assessment and Plan:   Patient remains stable CV wise. Continue supportive care/meds. Monitor HR.    * Pneumonia due to COVID-19 virus  -Being addressed by primary team     Bradycardia  Patient currently asymptomatic.   Bradycardia possibly from Covid infection   Recommend atropine PRN if patient becomes symptomatic.   No plans at this time     8/4/2020  -No CV complaints  -HR stable  -Continue to monitor for now    SAMMI (acute kidney injury)  -Mgmt as per primary team        VTE Risk Mitigation (From admission, onward)         Ordered     enoxaparin injection 40 mg  Every 24 hours      08/02/20 7251                Bibiana Bueno PA-C  Cardiology  [facility]

## 2020-08-07 NOTE — PLAN OF CARE
08/07/20 0758   Final Note   Assessment Type Final Discharge Note   Anticipated Discharge Disposition SNF   Right Care Referral Info   Post Acute Recommendation SNF / Sub-Acute Rehab   Facility Name [facility]

## 2020-08-07 NOTE — DISCHARGE SUMMARY
[facility] Hospital Medicine  Discharge Summary      Patient Name: Dilia Talley  MRN: [id number]  Admission Date: 7/28/2020  Hospital Length of Stay: 7 days  Discharge Date and Time: 8/4/2020  2:34 PM  Attending Physician: No att. providers found   Discharging Provider: Sonu Colón MD  Primary Care Provider: Leif Townsend MD      HPI:   Mrs. Talley is a 74 yo F with Hx of HTN, DM, HLD, who presents for evaluation of SOB. Patient is COVID+ and has been seen in the ED  4 times in the last two weeks for similar complaints. She is on home oxygen. Today she reports worsening of her SOB, associated with fever, chills, and dry cough. She was unable to take care of herself and seeked help. In ED her labs were noted to have new onset SAMMI with low BP and CXR with worsening infiltration. Patient is admitted for further management.                  * No surgery found *      Hospital Course:   7/29 pt was seen and examined at bedside  . She is aao x 2 in nad . She is pleasant confuse . She follow commands . She is  Complaining of SOB .  The case was discussed with Her grandson William Talely iv ( He Voice is the POA, Meera number [phone])  7/30 Pt was seen and examined at bedside . She Is more alert  Today . She is on 2 lt by nc . The Case was D/w William Talley iv ( He Voice is the POA, Meera number [phone]) and REFUSED  REMDESIVIR Tx .   7/31 Pt was seen and examined at bedside . She is aao x 2  In nad . She has a very poor appetite . She is on 2 lt by nasal canula   8/1 Pt was seen and examined at bedside . She is aao x 3 in nad . She has some confusion on and off . There e was no acute event overnight . She is on 3 lt  By nc   8/2 pt is aao x 2 in nad . The CO2 is 13 . TheCBG has been < than 250 , There is No  Significant anion gap . We will don  A ABG and d./c the D5 1/2 ns . She has some  sinus bradycardia on and off  .   8/3 - Patient Evaluated by Cards recommending no intervention related to  Bradycardia. Patient bouts asymptomatic. No events - Plan for SNF in progress.  Patient accepted at SNF. Patient seen and examined on date of discharge     Consults:   Consults (From admission, onward)        Status Ordering Provider     Inpatient consult to Cardiology  Once     Provider:  Nba Remy MD    Completed JOHN DWYER     IP consult to case management  Once     Provider:  (Not yet assigned)    Completed TEDDY GARCES new Assessment & Plan notes have been filed under this hospital service since the last note was generated.  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    Specialty: Family Medicine  Why: Following D/C from SNF  Contact information:  [address] [address]  [phone]                 Patient Instructions:      Diet diabetic     Activity as tolerated       Significant Diagnostic Studies: Labs: BMP: No results for input(s): GLU, NA, K, CL, CO2, BUN, CREATININE, CALCIUM, MG in the last 48 hours., CMP No results for input(s): NA, K, CL, CO2, GLU, BUN, CREATININE, CALCIUM, PROT, ALBUMIN, BILITOT, ALKPHOS, AST, ALT, ANIONGAP, ESTGFRAFRICA, EGFRNONAA in the last 48 hours., CBC No results for input(s): WBC, HGB, HCT, PLT in the last 48 hours. and All labs within the past 24 hours have been reviewed    Pending Diagnostic Studies:     None         Medications:  Reconciled Home Medications:      Medication List       START taking these medications    dexAMETHasone 6 MG tablet  Commonly known as: DECADRON  Take 1 tablet (6 mg total) by mouth once daily. for 10 days     enoxaparin 40 mg/0.4 mL Syrg  Commonly known as: LOVENOX  Inject 0.4 mLs (40 mg total) into the skin once daily.     thiamine 100 MG tablet  Take 1 tablet (100 mg total) by mouth once daily.        CONTINUE taking these medications    atorvastatin 40 MG tablet  Commonly known as: LIPITOR  atorvastatin 40 mg tablet   Take 1 tablet by mouth daily.     baclofen 10 MG tablet  Commonly known as: LIORESAL  Take 1 tablet (10 mg total) by mouth 3 (three) times daily.     ergocalciferol 50,000 unit Cap  Commonly known as: ERGOCALCIFEROL  Take 1 capsule (50,000 Units total) by mouth every 7 days.     furosemide 40 MG tablet  Commonly known as: LASIX  furosemide 40 mg tablet   Take 1 tablet by mouth daily.     gabapentin 300 MG capsule  Commonly known as: NEURONTIN  Take 1 capsule (300 mg total) by mouth 3 (three) times daily.     losartan-hydrochlorothiazide 50-12.5 mg 50-12.5 mg per tablet  Commonly known as: HYZAAR  Take 1 tablet by mouth once daily.     metFORMIN 1000 MG tablet  Commonly known as: GLUCOPHAGE  Take 1,000 mg by mouth 2 (two) times daily with meals.     ondansetron 4 MG tablet  Commonly known as: ZOFRAN  Take 1 tablet (4 mg total) by mouth every 6 (six) hours.     PREVNAR 13 (PF) 0.5 mL Syrg  Generic drug: pneumoc 13-channing conj-dip cr(PF)     * pulse oximeter device  Commonly known as: pulse oximeter  Use twice daily at 8 AM and 3 PM and record the value in MyChart as directed.     * pulse oximeter device  Commonly known as: pulse oximeter  by Apply Externally route 2 (two) times a day. Use twice daily at 8 AM and 3 PM and record the value in MyChart as directed.     tobramycin sulfate 0.3% 0.3 % ophthalmic solution  Commonly known as: TOBREX  tobramycin 0.3 % eye drops         * This list has 2 medication(s) that are the same as other medications  prescribed for you. Read the directions carefully, and ask your doctor or other care provider to review them with you.            STOP taking these medications    oxyCODONE 10 mg 12 hr tablet  Commonly known as: OXYCONTIN     sulfamethoxazole-trimethoprim 800-160mg 800-160 mg Tab  Commonly known as: BACTRIM DS            Indwelling Lines/Drains at time of discharge:   Lines/Drains/Airways     None                 Time spent on the discharge of patient: 35 minutes  Patient was seen and examined on the date of discharge and determined to be suitable for discharge.         Sonu Colón MD  Department of Hospital Medicine  [facility]

## 2020-08-10 ENCOUNTER — TELEPHONE (OUTPATIENT)
Dept: CARDIOLOGY | Facility: CLINIC | Age: 75
End: 2020-08-10

## 2020-08-10 NOTE — TELEPHONE ENCOUNTER
I called pt. No answer.no VM for me to leave a message.     I called pts son jorge. Pt stays at [facility] and I should call them to schedule f/u appt.     Spoke with Harriet at [facility]. appt scheduled.

## 2020-09-04 ENCOUNTER — OFFICE VISIT (OUTPATIENT)
Dept: CARDIOLOGY | Facility: CLINIC | Age: 75
End: 2020-09-04
Payer: MEDICARE

## 2020-09-04 ENCOUNTER — HOSPITAL ENCOUNTER (OUTPATIENT)
Dept: CARDIOLOGY | Facility: HOSPITAL | Age: 75
Discharge: HOME | End: 2020-09-04
Payer: MEDICARE

## 2020-09-04 VITALS
HEART RATE: 66 BPM | SYSTOLIC BLOOD PRESSURE: 108 MMHG | OXYGEN SATURATION: 97 % | BODY MASS INDEX: 29.95 KG/M2 | DIASTOLIC BLOOD PRESSURE: 62 MMHG | WEIGHT: 180 LBS

## 2020-09-04 DIAGNOSIS — J12.82 PNEUMONIA DUE TO COVID-19 VIRUS: ICD-10-CM

## 2020-09-04 DIAGNOSIS — R60.0 BILATERAL EDEMA OF LOWER EXTREMITY: ICD-10-CM

## 2020-09-04 DIAGNOSIS — E11.49 DIABETES MELLITUS TYPE 2 WITH NEUROLOGICAL MANIFESTATIONS: ICD-10-CM

## 2020-09-04 DIAGNOSIS — U07.1 PNEUMONIA DUE TO COVID-19 VIRUS: ICD-10-CM

## 2020-09-04 DIAGNOSIS — R00.1 BRADYCARDIA: ICD-10-CM

## 2020-09-04 DIAGNOSIS — R55 SYNCOPE AND COLLAPSE: Primary | ICD-10-CM

## 2020-09-04 DIAGNOSIS — R55 SYNCOPE AND COLLAPSE: ICD-10-CM

## 2020-09-04 PROCEDURE — 1159F MED LIST DOCD IN RCRD: CPT | Mod: HCWC,S$GLB,, | Performed by: PHYSICIAN ASSISTANT

## 2020-09-04 PROCEDURE — 93010 ELECTROCARDIOGRAM REPORT: CPT | Mod: HCWC,,, | Performed by: INTERNAL MEDICINE

## 2020-09-04 PROCEDURE — 99999 PR PBB SHADOW E&M-EST. PATIENT-LVL III: CPT | Mod: PBBFAC,HCWC,, | Performed by: PHYSICIAN ASSISTANT

## 2020-09-04 PROCEDURE — 99999 PR PBB SHADOW E&M-EST. PATIENT-LVL III: ICD-10-PCS | Mod: PBBFAC,HCWC,, | Performed by: PHYSICIAN ASSISTANT

## 2020-09-04 PROCEDURE — 1159F PR MEDICATION LIST DOCUMENTED IN MEDICAL RECORD: ICD-10-PCS | Mod: HCWC,S$GLB,, | Performed by: PHYSICIAN ASSISTANT

## 2020-09-04 PROCEDURE — 99214 PR OFFICE/OUTPT VISIT, EST, LEVL IV, 30-39 MIN: ICD-10-PCS | Mod: HCWC,S$GLB,, | Performed by: PHYSICIAN ASSISTANT

## 2020-09-04 PROCEDURE — 1101F PT FALLS ASSESS-DOCD LE1/YR: CPT | Mod: HCWC,CPTII,S$GLB, | Performed by: PHYSICIAN ASSISTANT

## 2020-09-04 PROCEDURE — 99214 OFFICE O/P EST MOD 30 MIN: CPT | Mod: HCWC,S$GLB,, | Performed by: PHYSICIAN ASSISTANT

## 2020-09-04 PROCEDURE — 93010 EKG 12-LEAD: ICD-10-PCS | Mod: HCWC,,, | Performed by: INTERNAL MEDICINE

## 2020-09-04 PROCEDURE — 1126F PR PAIN SEVERITY QUANTIFIED, NO PAIN PRESENT: ICD-10-PCS | Mod: HCWC,S$GLB,, | Performed by: PHYSICIAN ASSISTANT

## 2020-09-04 PROCEDURE — 93005 ELECTROCARDIOGRAM TRACING: CPT | Mod: HCWC

## 2020-09-04 PROCEDURE — 1126F AMNT PAIN NOTED NONE PRSNT: CPT | Mod: HCWC,S$GLB,, | Performed by: PHYSICIAN ASSISTANT

## 2020-09-04 PROCEDURE — 1101F PR PT FALLS ASSESS DOC 0-1 FALLS W/OUT INJ PAST YR: ICD-10-PCS | Mod: HCWC,CPTII,S$GLB, | Performed by: PHYSICIAN ASSISTANT

## 2020-09-04 NOTE — PROGRESS NOTES
Subjective:    Patient ID:  Dilia Talley is a 73 y.o. female who presents for follow-up of hospital follow-up      HPI   Ms. Talley is a 73 year old female patient whose current medical conditions include HTN, DM type II, and hyperlipidemia who presents today for hospital follow-up. Patient recently hospitalized at [facility] with PNA/COVID-19 infection. She was noted to be bradycardic during admission but asymptomatic and OP cardiology f/u was recommended. She returns today, accompanied by her caregiver. [facility]. States she is feeling better and getting stronger. No real cardiac complaints. Denies any chest pain, heaviness, or tightness. No SOB/GONZALEZ. No lightheadedness, dizziness, or palpitations. Chronic BLE edema. No PND or orthopnea. BP controlled today in office. Patient is compliant with her medications, will get updated list today. EKG today shows SR, with sinus arrhythmia.      Caretaker does report patient has had intermittent confusion at times. She is alert and oriented to person today. Unclear if this was her baseline. She has no focal deficits on exam. I spoke with nurse at facility states MD is aware, thinks she may have some element of delirium. Caretaker also states patient previously had 1-2 syncopal events on COVID unit, ? Weakness related/induced.     Review of Systems   Constitution: Negative for chills, decreased appetite, fever and malaise/fatigue.   HENT: Negative for congestion, hoarse voice and sore throat.    Eyes: Negative for blurred vision and discharge.   Cardiovascular: Positive for leg swelling and syncope. Negative for chest pain, claudication, cyanosis, dyspnea on exertion, irregular heartbeat, near-syncope, orthopnea, palpitations and paroxysmal nocturnal dyspnea.   Respiratory: Negative for cough, hemoptysis, shortness of breath, snoring, sputum production and wheezing.    Endocrine: Negative for cold intolerance and heat intolerance.    Hematologic/Lymphatic: Negative for bleeding problem. Does not bruise/bleed easily.   Skin: Negative for rash.   Musculoskeletal: Positive for arthritis and joint pain. Negative for back pain, joint swelling, muscle cramps, muscle weakness and myalgias.   Gastrointestinal: Negative for abdominal pain, constipation, diarrhea, heartburn, melena and nausea.   Genitourinary: Negative for hematuria.   Neurological: Negative for dizziness, focal weakness, headaches, light-headedness, loss of balance, numbness, paresthesias, seizures and weakness.        Intermittent confusion     Psychiatric/Behavioral: Negative for memory loss. The patient does not have insomnia.    Allergic/Immunologic: Negative for hives.     /62   Pulse 66   Wt 81.6 kg (180 lb)   SpO2 97%   BMI 29.95 kg/m²     Objective:    Physical Exam   Constitutional: She is oriented to person, place, and time. She appears well-developed and well-nourished. No distress.   HENT:   Head: Normocephalic and atraumatic.   Eyes: Pupils are equal, round, and reactive to light. Right eye exhibits no discharge. Left eye exhibits no discharge.   Neck: Neck supple. No JVD present.   Cardiovascular: Normal rate, regular rhythm, S1 normal and S2 normal.  Occasional extrasystoles are present.   No murmur heard.  Pulmonary/Chest: Effort normal and breath sounds normal. No respiratory distress. She has no wheezes. She has no rales.   Abdominal: Soft. She exhibits no distension.   Musculoskeletal:         General: Edema (BLE depedent) present.   Neurological: She is alert and oriented to person, place, and time.   Skin: Skin is warm and dry. She is not diaphoretic. No erythema.   Psychiatric: She has a normal mood and affect. Her behavior is normal. Thought content normal.   Nursing note and vitals reviewed.      Assessment:       1. Bradycardia    2. Diabetes mellitus type 2 with neurological manifestations    3. Pneumonia due to COVID-19 virus    4. Bilateral edema  of lower extremity    5. Syncope    Patient presents for hospital f/u. Overall seems to be recovering well post-COVID. No CV complaints. Caregiver present at appt today does report 1-2 syncopal episodes at their facility. Need to rule out any underlying arrythmmias with Zio. Check echo as well. Consider MPI stress test once more recovered. Unclear if patient is having element of delirium, she was alert and oriented at her appt today with no focal deficits on exam. Facility will further discuss with her children and workup further as warranted. Will get updated med list today.  Plan:   -Zio monitor with phone review  -Echo  -Update med list  -Discuss intermittent confusion with family members/MD at facility; patient alert and oriented today in clinic without focal deficits  -RTC 6 weeks with MD

## 2020-09-08 ENCOUNTER — TELEPHONE (OUTPATIENT)
Dept: CARDIOLOGY | Facility: CLINIC | Age: 75
End: 2020-09-08

## 2020-09-08 NOTE — TELEPHONE ENCOUNTER
"I attempted to call the numbers on file but I received the message "your call cannot be completed as dialed" for all the numbers listed  "

## 2020-09-09 DIAGNOSIS — R55 SYNCOPE, UNSPECIFIED SYNCOPE TYPE: Primary | ICD-10-CM

## 2020-09-09 DIAGNOSIS — R00.1 BRADYCARDIA: ICD-10-CM

## 2020-09-09 RX ORDER — INSULIN ASPART 100 [IU]/ML
INJECTION, SOLUTION INTRAVENOUS; SUBCUTANEOUS
COMMUNITY

## 2020-09-09 RX ORDER — LANCETS
EACH MISCELLANEOUS
COMMUNITY
Start: 2020-08-08

## 2020-09-09 RX ORDER — ACETAMINOPHEN 500 MG
1000 TABLET ORAL EVERY 6 HOURS PRN
COMMUNITY

## 2020-09-09 RX ORDER — GABAPENTIN 100 MG/1
100 CAPSULE ORAL 3 TIMES DAILY
COMMUNITY

## 2020-09-09 RX ORDER — ASCORBIC ACID 500 MG
500 TABLET ORAL DAILY
COMMUNITY

## 2020-09-09 NOTE — TELEPHONE ENCOUNTER
[facility] has faxed over an updated MAR and I have updated Ms Dilia's med list in her chart.

## 2020-09-09 NOTE — TELEPHONE ENCOUNTER
I spoke with Ms Twila at [facility] and scheduled the Zio and Echo on 9/29/20 at [facility].

## 2020-09-29 ENCOUNTER — HOSPITAL ENCOUNTER (OUTPATIENT)
Dept: CARDIOLOGY | Facility: HOSPITAL | Age: 75
Discharge: HOME | End: 2020-09-29
Attending: PHYSICIAN ASSISTANT
Payer: MEDICARE

## 2020-09-29 VITALS
WEIGHT: 180 LBS | DIASTOLIC BLOOD PRESSURE: 62 MMHG | SYSTOLIC BLOOD PRESSURE: 108 MMHG | HEIGHT: 65 IN | BODY MASS INDEX: 29.99 KG/M2

## 2020-09-29 DIAGNOSIS — R00.1 BRADYCARDIA: ICD-10-CM

## 2020-09-29 DIAGNOSIS — R55 SYNCOPE, UNSPECIFIED SYNCOPE TYPE: ICD-10-CM

## 2020-09-29 LAB
AORTIC ROOT ANNULUS: 3.3 CM
ASCENDING AORTA: 3.19 CM
AV INDEX (PROSTH): 0.88
AV MEAN GRADIENT: 3 MMHG
AV PEAK GRADIENT: 6 MMHG
AV VALVE AREA: 2.87 CM2
AV VELOCITY RATIO: 0.89
BSA FOR ECHO PROCEDURE: 1.93 M2
CV ECHO LV RWT: 0.5 CM
DOP CALC AO PEAK VEL: 1.18 M/S
DOP CALC AO VTI: 23.34 CM
DOP CALC LVOT AREA: 3.3 CM2
DOP CALC LVOT DIAMETER: 2.04 CM
DOP CALC LVOT PEAK VEL: 1.05 M/S
DOP CALC LVOT STROKE VOLUME: 66.94 CM3
DOP CALC RVOT PEAK VEL: 1.2 M/S
DOP CALC RVOT VTI: 16.86 CM
DOP CALCLVOT PEAK VEL VTI: 20.49 CM
E WAVE DECELERATION TIME: 284.24 MSEC
E/A RATIO: 0.76
E/E' RATIO: 9.43 M/S
ECHO LV POSTERIOR WALL: 1.12 CM (ref 0.6–1.1)
FRACTIONAL SHORTENING: 38 % (ref 28–44)
INTERVENTRICULAR SEPTUM: 1.07 CM (ref 0.6–1.1)
IVRT: 88.49 MSEC
LA MAJOR: 4.68 CM
LA MINOR: 4.91 CM
LA WIDTH: 3.62 CM
LEFT ATRIUM SIZE: 4.11 CM
LEFT ATRIUM VOLUME INDEX: 32 ML/M2
LEFT ATRIUM VOLUME: 60.6 CM3
LEFT INTERNAL DIMENSION IN SYSTOLE: 2.76 CM (ref 2.1–4)
LEFT VENTRICLE DIASTOLIC VOLUME INDEX: 47.95 ML/M2
LEFT VENTRICLE DIASTOLIC VOLUME: 90.7 ML
LEFT VENTRICLE MASS INDEX: 91 G/M2
LEFT VENTRICLE SYSTOLIC VOLUME INDEX: 15 ML/M2
LEFT VENTRICLE SYSTOLIC VOLUME: 28.42 ML
LEFT VENTRICULAR INTERNAL DIMENSION IN DIASTOLE: 4.46 CM (ref 3.5–6)
LEFT VENTRICULAR MASS: 171.46 G
LV LATERAL E/E' RATIO: 9.43 M/S
LV SEPTAL E/E' RATIO: 9.43 M/S
MV PEAK A VEL: 0.87 M/S
MV PEAK E VEL: 0.66 M/S
MV STENOSIS PRESSURE HALF TIME: 82.43 MS
MV VALVE AREA P 1/2 METHOD: 2.67 CM2
PISA TR MAX VEL: 2.96 M/S
PULM VEIN S/D RATIO: 1.5
PV MEAN GRADIENT: 1.93 MMHG
PV PEAK D VEL: 0.5 M/S
PV PEAK GRADIENT: 5.77 MMHG
PV PEAK S VEL: 0.75 M/S
PV PEAK VELOCITY: 1.4 CM/S
RA MAJOR: 3.85 CM
RA PRESSURE: 3 MMHG
RA WIDTH: 3.39 CM
RIGHT VENTRICULAR END-DIASTOLIC DIMENSION: 2.71 CM
SINUS: 3.24 CM
STJ: 3.35 CM
TDI LATERAL: 0.07 M/S
TDI SEPTAL: 0.07 M/S
TDI: 0.07 M/S
TR MAX PG: 35 MMHG
TV REST PULMONARY ARTERY PRESSURE: 38 MMHG

## 2020-09-29 PROCEDURE — 93306 ECHO (CUPID ONLY): ICD-10-PCS | Mod: 26,HCWC,, | Performed by: INTERNAL MEDICINE

## 2020-09-29 PROCEDURE — 93306 TTE W/DOPPLER COMPLETE: CPT | Mod: 26,HCWC,, | Performed by: INTERNAL MEDICINE

## 2020-09-29 PROCEDURE — 93306 TTE W/DOPPLER COMPLETE: CPT | Mod: HCWC

## 2020-09-30 ENCOUNTER — TELEPHONE (OUTPATIENT)
Dept: CARDIOLOGY | Facility: CLINIC | Age: 75
End: 2020-09-30

## 2020-09-30 NOTE — TELEPHONE ENCOUNTER
I call the [facility] ([phone]) and spoke with Nurse Bessy and gave her the echo results for Ms Dilia. Echo reviewed, showed normal heart strength/function.     Continue same meds/mgmt. Will await Zio results.

## 2020-09-30 NOTE — TELEPHONE ENCOUNTER
Please phone patient. Echo reviewed, showed normal heart strength/function.    Continue same meds/mgmt. Will await Zio results.

## 2020-10-09 ENCOUNTER — TELEPHONE (OUTPATIENT)
Dept: CARDIOLOGY | Facility: CLINIC | Age: 75
End: 2020-10-09

## 2020-10-09 NOTE — TELEPHONE ENCOUNTER
----- Message from Claudia Soto sent at 10/9/2020  2:16 PM CDT -----  Pt is in living at [facility] and they are needing to get information on her appt and hot to work the device she was sent home with. Please give patient a call to discuss. [phone]       Thanks   Claudia Soto

## 2020-10-09 NOTE — TELEPHONE ENCOUNTER
QUIANA Eng - [phone]   -- please call Miss Eng - needs instructions on what to do with Zio - please call her on Monday and let her know - pt got back to facility with no instructions - was placed 9/29 at [facility]  bradlyo should be ready to come off 10/12 or 10/13 and Miss Eng will make follow up to see Bibiana for 6 week follow up around 10/22-10/23  Also will fax progress note from 9/4 visit with Bibiana

## 2020-10-12 ENCOUNTER — OUTPATIENT CASE MANAGEMENT (OUTPATIENT)
Dept: ADMINISTRATIVE | Facility: OTHER | Age: 75
End: 2020-10-12

## 2020-10-12 NOTE — PROGRESS NOTES
Outpatient Care Management  Patient Not Qualified    Patient: Dilia Talley  MRN:  [id number]  Date of Service:  10/12/2020  Completed by:  Ines Otero RN    Chief Complaint   Patient presents with    OPCM RN First Assessment Attempt    Case Closure       Patient Summary     Program:  COVID-19  Qualification Status:  No  Reason Not Qualified:  Resides in Nursing Home

## 2020-10-13 ENCOUNTER — TELEPHONE (OUTPATIENT)
Dept: CARDIOLOGY | Facility: CLINIC | Age: 75
End: 2020-10-13

## 2020-10-13 NOTE — TELEPHONE ENCOUNTER
I returned call to [facility]. She states pt threw her Zio monitor in the trash. They have looked for it but can not find it.   Pt states she was told she could take it off herself on 10/12 and threw it away. States she wasn't aware she had to send it back.   Nurse came in to get it to put in the box to mail back and it was gone.   Please advise on next step

## 2020-10-13 NOTE — TELEPHONE ENCOUNTER
----- Message from Gabbi Becker sent at 10/13/2020 10:09 AM CDT -----  Regarding: Ryanne Eng nurse w/ [facility] stated pt took off DO  monitor and threw it in the trash , unable to locate  .. call back : cell [phone] or [phone]

## 2020-10-21 ENCOUNTER — TELEPHONE (OUTPATIENT)
Dept: CARDIOLOGY | Facility: CLINIC | Age: 75
End: 2020-10-21

## 2020-11-04 ENCOUNTER — HOSPITAL ENCOUNTER (OUTPATIENT)
Facility: HOSPITAL | Age: 75
Discharge: NURSING, REHAB OR LONG-TERM CARE | End: 2020-11-05
Attending: EMERGENCY MEDICINE | Admitting: INTERNAL MEDICINE
Payer: MEDICARE

## 2020-11-04 ENCOUNTER — OFFICE VISIT (OUTPATIENT)
Dept: CARDIOLOGY | Facility: CLINIC | Age: 75
End: 2020-11-04
Payer: MEDICARE

## 2020-11-04 ENCOUNTER — HOSPITAL ENCOUNTER (OUTPATIENT)
Dept: CARDIOLOGY | Facility: HOSPITAL | Age: 75
Discharge: HOME | End: 2020-11-04
Payer: MEDICARE

## 2020-11-04 VITALS
HEART RATE: 70 BPM | SYSTOLIC BLOOD PRESSURE: 130 MMHG | HEIGHT: 65 IN | BODY MASS INDEX: 29.97 KG/M2 | OXYGEN SATURATION: 99 % | WEIGHT: 179.88 LBS | DIASTOLIC BLOOD PRESSURE: 64 MMHG

## 2020-11-04 DIAGNOSIS — I50.33 ACUTE ON CHRONIC DIASTOLIC HEART FAILURE: Primary | ICD-10-CM

## 2020-11-04 DIAGNOSIS — I10 CHRONIC HYPERTENSION: ICD-10-CM

## 2020-11-04 DIAGNOSIS — R60.0 BILATERAL LOWER EXTREMITY EDEMA: ICD-10-CM

## 2020-11-04 DIAGNOSIS — R60.0 BILATERAL EDEMA OF LOWER EXTREMITY: Primary | ICD-10-CM

## 2020-11-04 DIAGNOSIS — E11.49 DIABETES MELLITUS TYPE 2 WITH NEUROLOGICAL MANIFESTATIONS: ICD-10-CM

## 2020-11-04 DIAGNOSIS — I49.9 IRREGULAR HEARTBEAT: ICD-10-CM

## 2020-11-04 DIAGNOSIS — I50.33 ACUTE ON CHRONIC DIASTOLIC CONGESTIVE HEART FAILURE: ICD-10-CM

## 2020-11-04 DIAGNOSIS — R60.1 ANASARCA: ICD-10-CM

## 2020-11-04 DIAGNOSIS — E66.01 MORBID OBESITY: ICD-10-CM

## 2020-11-04 DIAGNOSIS — R00.1 BRADYCARDIA: ICD-10-CM

## 2020-11-04 DIAGNOSIS — R60.0 BILATERAL LEG EDEMA: ICD-10-CM

## 2020-11-04 LAB
ALBUMIN SERPL BCP-MCNC: 2 G/DL (ref 3.5–5.2)
ALP SERPL-CCNC: 105 U/L (ref 55–135)
ALT SERPL W/O P-5'-P-CCNC: 8 U/L (ref 10–44)
ANION GAP SERPL CALC-SCNC: 7 MMOL/L (ref 8–16)
APTT BLDCRRT: 28.3 SEC (ref 21–32)
AST SERPL-CCNC: 11 U/L (ref 10–40)
BASOPHILS # BLD AUTO: 0.07 K/UL (ref 0–0.2)
BASOPHILS NFR BLD: 0.9 % (ref 0–1.9)
BILIRUB SERPL-MCNC: 0.3 MG/DL (ref 0.1–1)
BNP SERPL-MCNC: 137 PG/ML (ref 0–99)
BUN SERPL-MCNC: 12 MG/DL (ref 8–23)
CALCIUM SERPL-MCNC: 9.7 MG/DL (ref 8.7–10.5)
CHLORIDE SERPL-SCNC: 109 MMOL/L (ref 95–110)
CO2 SERPL-SCNC: 24 MMOL/L (ref 23–29)
CREAT SERPL-MCNC: 0.9 MG/DL (ref 0.5–1.4)
DIFFERENTIAL METHOD: ABNORMAL
EOSINOPHIL # BLD AUTO: 0.3 K/UL (ref 0–0.5)
EOSINOPHIL NFR BLD: 3.4 % (ref 0–8)
ERYTHROCYTE [DISTWIDTH] IN BLOOD BY AUTOMATED COUNT: 14.2 % (ref 11.5–14.5)
EST. GFR  (AFRICAN AMERICAN): >60 ML/MIN/1.73 M^2
EST. GFR  (NON AFRICAN AMERICAN): >60 ML/MIN/1.73 M^2
GLUCOSE SERPL-MCNC: 98 MG/DL (ref 70–110)
HCT VFR BLD AUTO: 33.1 % (ref 37–48.5)
HGB BLD-MCNC: 10.2 G/DL (ref 12–16)
IMM GRANULOCYTES # BLD AUTO: 0.03 K/UL (ref 0–0.04)
IMM GRANULOCYTES NFR BLD AUTO: 0.4 % (ref 0–0.5)
INR PPP: 1 (ref 0.8–1.2)
LYMPHOCYTES # BLD AUTO: 2.8 K/UL (ref 1–4.8)
LYMPHOCYTES NFR BLD: 36 % (ref 18–48)
MAGNESIUM SERPL-MCNC: 1.6 MG/DL (ref 1.6–2.6)
MCH RBC QN AUTO: 29.1 PG (ref 27–31)
MCHC RBC AUTO-ENTMCNC: 30.8 G/DL (ref 32–36)
MCV RBC AUTO: 95 FL (ref 82–98)
MONOCYTES # BLD AUTO: 0.7 K/UL (ref 0.3–1)
MONOCYTES NFR BLD: 9.4 % (ref 4–15)
NEUTROPHILS # BLD AUTO: 3.9 K/UL (ref 1.8–7.7)
NEUTROPHILS NFR BLD: 49.9 % (ref 38–73)
NRBC BLD-RTO: 0 /100 WBC
PLATELET # BLD AUTO: 459 K/UL (ref 150–350)
PMV BLD AUTO: 10 FL (ref 9.2–12.9)
POCT GLUCOSE: 101 MG/DL (ref 70–110)
POCT GLUCOSE: 82 MG/DL (ref 70–110)
POTASSIUM SERPL-SCNC: 3.1 MMOL/L (ref 3.5–5.1)
PROT SERPL-MCNC: 6.9 G/DL (ref 6–8.4)
PROTHROMBIN TIME: 10.5 SEC (ref 9–12.5)
RBC # BLD AUTO: 3.5 M/UL (ref 4–5.4)
SODIUM SERPL-SCNC: 140 MMOL/L (ref 136–145)
TROPONIN I SERPL DL<=0.01 NG/ML-MCNC: 0.02 NG/ML (ref 0–0.03)
WBC # BLD AUTO: 7.74 K/UL (ref 3.9–12.7)

## 2020-11-04 PROCEDURE — 1125F PR PAIN SEVERITY QUANTIFIED, PAIN PRESENT: ICD-10-PCS | Mod: S$GLB,,, | Performed by: PHYSICIAN ASSISTANT

## 2020-11-04 PROCEDURE — 63600175 PHARM REV CODE 636 W HCPCS: Performed by: NURSE PRACTITIONER

## 2020-11-04 PROCEDURE — 36415 COLL VENOUS BLD VENIPUNCTURE: CPT

## 2020-11-04 PROCEDURE — 25000003 PHARM REV CODE 250: Performed by: NURSE PRACTITIONER

## 2020-11-04 PROCEDURE — 63600175 PHARM REV CODE 636 W HCPCS: Performed by: EMERGENCY MEDICINE

## 2020-11-04 PROCEDURE — 85730 THROMBOPLASTIN TIME PARTIAL: CPT

## 2020-11-04 PROCEDURE — 25000003 PHARM REV CODE 250: Performed by: EMERGENCY MEDICINE

## 2020-11-04 PROCEDURE — 85610 PROTHROMBIN TIME: CPT

## 2020-11-04 PROCEDURE — G0378 HOSPITAL OBSERVATION PER HR: HCPCS

## 2020-11-04 PROCEDURE — 99999 PR PBB SHADOW E&M-EST. PATIENT-LVL IV: ICD-10-PCS | Mod: PBBFAC,,, | Performed by: PHYSICIAN ASSISTANT

## 2020-11-04 PROCEDURE — 93005 ELECTROCARDIOGRAM TRACING: CPT

## 2020-11-04 PROCEDURE — 1159F PR MEDICATION LIST DOCUMENTED IN MEDICAL RECORD: ICD-10-PCS | Mod: S$GLB,,, | Performed by: PHYSICIAN ASSISTANT

## 2020-11-04 PROCEDURE — 96374 THER/PROPH/DIAG INJ IV PUSH: CPT

## 2020-11-04 PROCEDURE — 99291 CRITICAL CARE FIRST HOUR: CPT | Mod: 25

## 2020-11-04 PROCEDURE — 93010 EKG 12-LEAD: ICD-10-PCS | Mod: ,,, | Performed by: INTERNAL MEDICINE

## 2020-11-04 PROCEDURE — 84484 ASSAY OF TROPONIN QUANT: CPT

## 2020-11-04 PROCEDURE — 93010 ELECTROCARDIOGRAM REPORT: CPT | Mod: ,,, | Performed by: INTERNAL MEDICINE

## 2020-11-04 PROCEDURE — 85025 COMPLETE CBC W/AUTO DIFF WBC: CPT

## 2020-11-04 PROCEDURE — 1101F PT FALLS ASSESS-DOCD LE1/YR: CPT | Mod: CPTII,S$GLB,, | Performed by: PHYSICIAN ASSISTANT

## 2020-11-04 PROCEDURE — 83880 ASSAY OF NATRIURETIC PEPTIDE: CPT

## 2020-11-04 PROCEDURE — 1101F PR PT FALLS ASSESS DOC 0-1 FALLS W/OUT INJ PAST YR: ICD-10-PCS | Mod: CPTII,S$GLB,, | Performed by: PHYSICIAN ASSISTANT

## 2020-11-04 PROCEDURE — 99215 PR OFFICE/OUTPT VISIT, EST, LEVL V, 40-54 MIN: ICD-10-PCS | Mod: 25,S$GLB,, | Performed by: PHYSICIAN ASSISTANT

## 2020-11-04 PROCEDURE — 99999 PR PBB SHADOW E&M-EST. PATIENT-LVL IV: CPT | Mod: PBBFAC,,, | Performed by: PHYSICIAN ASSISTANT

## 2020-11-04 PROCEDURE — 99215 OFFICE O/P EST HI 40 MIN: CPT | Mod: 25,S$GLB,, | Performed by: PHYSICIAN ASSISTANT

## 2020-11-04 PROCEDURE — 1125F AMNT PAIN NOTED PAIN PRSNT: CPT | Mod: S$GLB,,, | Performed by: PHYSICIAN ASSISTANT

## 2020-11-04 PROCEDURE — 83735 ASSAY OF MAGNESIUM: CPT

## 2020-11-04 PROCEDURE — 80053 COMPREHEN METABOLIC PANEL: CPT

## 2020-11-04 PROCEDURE — 1159F MED LIST DOCD IN RCRD: CPT | Mod: S$GLB,,, | Performed by: PHYSICIAN ASSISTANT

## 2020-11-04 PROCEDURE — 96372 THER/PROPH/DIAG INJ SC/IM: CPT | Mod: 59

## 2020-11-04 RX ORDER — ALLOPURINOL 100 MG/1
TABLET ORAL
COMMUNITY

## 2020-11-04 RX ORDER — ACETAMINOPHEN 325 MG/1
650 TABLET ORAL EVERY 6 HOURS PRN
Status: DISCONTINUED | OUTPATIENT
Start: 2020-11-04 | End: 2020-11-05 | Stop reason: HOSPADM

## 2020-11-04 RX ORDER — POTASSIUM CHLORIDE 20 MEQ/1
40 TABLET, EXTENDED RELEASE ORAL 2 TIMES DAILY
Status: DISCONTINUED | OUTPATIENT
Start: 2020-11-05 | End: 2020-11-05

## 2020-11-04 RX ORDER — INSULIN ASPART 100 [IU]/ML
1-10 INJECTION, SOLUTION INTRAVENOUS; SUBCUTANEOUS
Status: DISCONTINUED | OUTPATIENT
Start: 2020-11-04 | End: 2020-11-05 | Stop reason: HOSPADM

## 2020-11-04 RX ORDER — GLUCAGON 1 MG
1 KIT INJECTION
Status: DISCONTINUED | OUTPATIENT
Start: 2020-11-04 | End: 2020-11-05 | Stop reason: HOSPADM

## 2020-11-04 RX ORDER — SODIUM CHLORIDE 0.9 % (FLUSH) 0.9 %
10 SYRINGE (ML) INJECTION
Status: DISCONTINUED | OUTPATIENT
Start: 2020-11-04 | End: 2020-11-05 | Stop reason: HOSPADM

## 2020-11-04 RX ORDER — FAMOTIDINE 20 MG/1
20 TABLET, FILM COATED ORAL 2 TIMES DAILY
Status: DISCONTINUED | OUTPATIENT
Start: 2020-11-04 | End: 2020-11-05 | Stop reason: HOSPADM

## 2020-11-04 RX ORDER — IBUPROFEN 200 MG
24 TABLET ORAL
Status: DISCONTINUED | OUTPATIENT
Start: 2020-11-04 | End: 2020-11-05 | Stop reason: HOSPADM

## 2020-11-04 RX ORDER — FUROSEMIDE 10 MG/ML
40 INJECTION INTRAMUSCULAR; INTRAVENOUS
Status: DISCONTINUED | OUTPATIENT
Start: 2020-11-05 | End: 2020-11-05 | Stop reason: HOSPADM

## 2020-11-04 RX ORDER — FUROSEMIDE 10 MG/ML
80 INJECTION INTRAMUSCULAR; INTRAVENOUS
Status: COMPLETED | OUTPATIENT
Start: 2020-11-04 | End: 2020-11-04

## 2020-11-04 RX ORDER — ENOXAPARIN SODIUM 100 MG/ML
40 INJECTION SUBCUTANEOUS EVERY 24 HOURS
Status: DISCONTINUED | OUTPATIENT
Start: 2020-11-04 | End: 2020-11-05 | Stop reason: HOSPADM

## 2020-11-04 RX ORDER — LOSARTAN POTASSIUM 50 MG/1
50 TABLET ORAL DAILY
Status: DISCONTINUED | OUTPATIENT
Start: 2020-11-05 | End: 2020-11-05 | Stop reason: HOSPADM

## 2020-11-04 RX ORDER — POTASSIUM CHLORIDE 1.5 G/1.58G
20 POWDER, FOR SOLUTION ORAL
Status: COMPLETED | OUTPATIENT
Start: 2020-11-04 | End: 2020-11-04

## 2020-11-04 RX ORDER — ALLOPURINOL 100 MG/1
100 TABLET ORAL DAILY
Status: DISCONTINUED | OUTPATIENT
Start: 2020-11-05 | End: 2020-11-05 | Stop reason: HOSPADM

## 2020-11-04 RX ORDER — ONDANSETRON 2 MG/ML
4 INJECTION INTRAMUSCULAR; INTRAVENOUS EVERY 8 HOURS PRN
Status: DISCONTINUED | OUTPATIENT
Start: 2020-11-04 | End: 2020-11-05 | Stop reason: HOSPADM

## 2020-11-04 RX ORDER — IBUPROFEN 200 MG
16 TABLET ORAL
Status: DISCONTINUED | OUTPATIENT
Start: 2020-11-04 | End: 2020-11-05 | Stop reason: HOSPADM

## 2020-11-04 RX ORDER — FUROSEMIDE 10 MG/ML
40 INJECTION INTRAMUSCULAR; INTRAVENOUS
Status: DISCONTINUED | OUTPATIENT
Start: 2020-11-04 | End: 2020-11-04

## 2020-11-04 RX ORDER — ATORVASTATIN CALCIUM 40 MG/1
40 TABLET, FILM COATED ORAL NIGHTLY
Status: DISCONTINUED | OUTPATIENT
Start: 2020-11-04 | End: 2020-11-05 | Stop reason: HOSPADM

## 2020-11-04 RX ADMIN — POTASSIUM CHLORIDE 20 MEQ: 1.5 POWDER, FOR SOLUTION ORAL at 03:11

## 2020-11-04 RX ADMIN — ATORVASTATIN CALCIUM 40 MG: 40 TABLET, FILM COATED ORAL at 09:11

## 2020-11-04 RX ADMIN — FAMOTIDINE 20 MG: 20 TABLET ORAL at 09:11

## 2020-11-04 RX ADMIN — ACETAMINOPHEN 650 MG: 325 TABLET ORAL at 09:11

## 2020-11-04 RX ADMIN — ENOXAPARIN SODIUM 40 MG: 40 INJECTION SUBCUTANEOUS at 06:11

## 2020-11-04 RX ADMIN — FUROSEMIDE 80 MG: 10 INJECTION, SOLUTION INTRAMUSCULAR; INTRAVENOUS at 01:11

## 2020-11-04 NOTE — ED PROVIDER NOTES
SCRIBE #1 NOTE: I, Aissatou James, am scribing for, and in the presence of, Columba Lorenzo MD. I have scribed the entire note.       History     Chief Complaint   Patient presents with    Leg Swelling     BLE, sent by MD     Review of patient's allergies indicates:  No Known Allergies      History of Present Illness     HPI    11/4/2020, 12:09 PM  History obtained from the patient      History of Present Illness: Dilia Talley is a 74 y.o. female patient with a PMHx of DM and HTN who presents to the Emergency Department for evaluation of BLE swelling which onset gradually. Patient was sent from cardiology clinic. Symptoms are constant and moderate in severity. No mitigating or exacerbating factors reported. No associated sxs. Patient denies any fever, chills, CP, SOB, cough, palpitations, n/v, dizziness, lightheadedness, and all other sxs at this time. No prior Tx. Patient is unsure if she is on a fluid pill. No further complaints or concerns at this time.       Arrival mode: Personal vehicle    PCP: Leif Townsend MD        Past Medical History:  Past Medical History:   Diagnosis Date    Arthritis     Diabetes     Gout     HTN (hypertension)     Neuropathy     Obese        Past Surgical History:  Past Surgical History:   Procedure Laterality Date    UMBILICAL HERNIA REPAIR           Family History:  Family History   Problem Relation Age of Onset    Diabetes Sister     Hypertension Sister        Social History:  Social History     Tobacco Use    Smoking status: Never Smoker    Smokeless tobacco: Never Used   Substance and Sexual Activity    Alcohol use: No    Drug use: No    Sexual activity: Not Currently     Partners: Male     Birth control/protection: None        Review of Systems     Review of Systems   Constitutional: Negative for activity change, appetite change, chills, diaphoresis, fatigue and fever.   HENT: Negative for congestion, ear pain, nosebleeds, rhinorrhea, sinus pain, sore throat  and trouble swallowing.    Eyes: Negative for pain and discharge.   Respiratory: Negative for cough, chest tightness, shortness of breath, wheezing and stridor.    Cardiovascular: Positive for leg swelling. Negative for chest pain and palpitations.   Gastrointestinal: Negative for abdominal distention, abdominal pain, blood in stool, constipation, diarrhea, nausea and vomiting.   Genitourinary: Negative for difficulty urinating, dysuria, flank pain, frequency, hematuria and urgency.   Musculoskeletal: Negative for arthralgias, back pain, myalgias and neck pain.   Skin: Negative for pallor, rash and wound.   Neurological: Negative for dizziness, syncope, weakness, light-headedness, numbness and headaches.   Hematological: Does not bruise/bleed easily.   Psychiatric/Behavioral: Negative for confusion and self-injury.   All other systems reviewed and are negative.     Physical Exam     Initial Vitals [11/04/20 1149]   BP Pulse Resp Temp SpO2   (!) 154/74 78 16 98 °F (36.7 °C) 100 %      MAP       --          Physical Exam  Nursing Notes and Vital Signs Reviewed.  Constitutional: Patient is in no acute distress. Well-developed and well-nourished.  Head: Atraumatic. Normocephalic.  Eyes: PERRL. EOM intact. Conjunctivae are not pale. No scleral icterus.  ENT: Mucous membranes are moist. Oropharynx is clear and symmetric.    Neck: Supple. Full ROM. No lymphadenopathy.  Cardiovascular: Regular rate. Regular rhythm. No murmurs, rubs, or gallops. Distal pulses are 2+ and symmetric.  Pulmonary/Chest: No respiratory distress. Clear to auscultation bilaterally. No wheezing or rales.  Abdominal: Soft and non-distended.  There is no tenderness.  No rebound, guarding, or rigidity. Good bowel sounds.  Genitourinary: No CVA tenderness  Musculoskeletal: Moves all extremities. No obvious deformities. 3-4+ edema from feet to mid thighs. No calf tenderness.  Skin: Warm and dry.  Neurological:  Alert, awake, and appropriate.  Normal  "speech.  No acute focal neurological deficits are appreciated.  Psychiatric: Normal affect. Good eye contact. Appropriate in content.     ED Course   Critical Care    Date/Time: 11/4/2020 3:46 PM  Performed by: Columba Lorenoz MD  Authorized by: Columba Lorenzo MD   Direct patient critical care time: 21 minutes  Additional history critical care time: 9 minutes  Ordering / reviewing critical care time: 18 minutes  Documentation critical care time: 6 minutes  Consulting other physicians critical care time: 6 minutes  Consult with family critical care time: 0 minutes  Other critical care time: 0 minutes  Total critical care time (exclusive of procedural time) : 60 minutes  Critical care time was exclusive of separately billable procedures and treating other patients and teaching time.  Critical care was necessary to treat or prevent imminent or life-threatening deterioration of the following conditions: BLE edema.  Critical care was time spent personally by me on the following activities: blood draw for specimens, development of treatment plan with patient or surrogate, discussions with consultants, interpretation of cardiac output measurements, evaluation of patient's response to treatment, examination of patient, obtaining history from patient or surrogate, ordering and performing treatments and interventions, ordering and review of laboratory studies, ordering and review of radiographic studies, pulse oximetry, re-evaluation of patient's condition and review of old charts.        ED Vital Signs:  Vitals:    11/04/20 1149 11/04/20 1200 11/04/20 1208 11/04/20 1308   BP: (!) 154/74  (!) 155/67    Pulse: 78 70 70 72   Resp: 16  17 17   Temp: 98 °F (36.7 °C)      TempSrc: Oral      SpO2: 100%  95% 97%   Weight: 81.2 kg (179 lb)      Height: 5' 5" (1.651 m)       11/04/20 1312 11/04/20 1331 11/04/20 1346 11/04/20 1401   BP: (!) 150/67 135/63  130/64   Pulse: 71 71 72 69   Resp: 18 15 16 16   Temp:     "   TempSrc:       SpO2: 98% 98% 98% 98%   Weight:       Height:        11/04/20 1431 11/04/20 1501 11/04/20 1531 11/04/20 1602   BP: (!) 146/65 (!) 160/75 (!) 153/109 (!) 183/86   Pulse: 75 76 75 100   Resp: 16 18 16 (!) 23   Temp:       TempSrc:       SpO2: 99%  100% 99%   Weight:       Height:           Abnormal Lab Results:  Labs Reviewed   CBC W/ AUTO DIFFERENTIAL - Abnormal; Notable for the following components:       Result Value    RBC 3.50 (*)     Hemoglobin 10.2 (*)     Hematocrit 33.1 (*)     MCHC 30.8 (*)     Platelets 459 (*)     All other components within normal limits   COMPREHENSIVE METABOLIC PANEL - Abnormal; Notable for the following components:    Potassium 3.1 (*)     Albumin 2.0 (*)     ALT 8 (*)     Anion Gap 7 (*)     All other components within normal limits   B-TYPE NATRIURETIC PEPTIDE - Abnormal; Notable for the following components:     (*)     All other components within normal limits   TROPONIN I   APTT   PROTIME-INR   MAGNESIUM   HEMOGLOBIN A1C   POCT GLUCOSE MONITORING CONTINUOUS        All Lab Results:  Results for orders placed or performed during the hospital encounter of 11/04/20   CBC auto differential   Result Value Ref Range    WBC 7.74 3.90 - 12.70 K/uL    RBC 3.50 (L) 4.00 - 5.40 M/uL    Hemoglobin 10.2 (L) 12.0 - 16.0 g/dL    Hematocrit 33.1 (L) 37.0 - 48.5 %    MCV 95 82 - 98 fL    MCH 29.1 27.0 - 31.0 pg    MCHC 30.8 (L) 32.0 - 36.0 g/dL    RDW 14.2 11.5 - 14.5 %    Platelets 459 (H) 150 - 350 K/uL    MPV 10.0 9.2 - 12.9 fL    Immature Granulocytes 0.4 0.0 - 0.5 %    Gran # (ANC) 3.9 1.8 - 7.7 K/uL    Immature Grans (Abs) 0.03 0.00 - 0.04 K/uL    Lymph # 2.8 1.0 - 4.8 K/uL    Mono # 0.7 0.3 - 1.0 K/uL    Eos # 0.3 0.0 - 0.5 K/uL    Baso # 0.07 0.00 - 0.20 K/uL    nRBC 0 0 /100 WBC    Gran % 49.9 38.0 - 73.0 %    Lymph % 36.0 18.0 - 48.0 %    Mono % 9.4 4.0 - 15.0 %    Eosinophil % 3.4 0.0 - 8.0 %    Basophil % 0.9 0.0 - 1.9 %    Differential Method Automated     Comprehensive metabolic panel   Result Value Ref Range    Sodium 140 136 - 145 mmol/L    Potassium 3.1 (L) 3.5 - 5.1 mmol/L    Chloride 109 95 - 110 mmol/L    CO2 24 23 - 29 mmol/L    Glucose 98 70 - 110 mg/dL    BUN 12 8 - 23 mg/dL    Creatinine 0.9 0.5 - 1.4 mg/dL    Calcium 9.7 8.7 - 10.5 mg/dL    Total Protein 6.9 6.0 - 8.4 g/dL    Albumin 2.0 (L) 3.5 - 5.2 g/dL    Total Bilirubin 0.3 0.1 - 1.0 mg/dL    Alkaline Phosphatase 105 55 - 135 U/L    AST 11 10 - 40 U/L    ALT 8 (L) 10 - 44 U/L    Anion Gap 7 (L) 8 - 16 mmol/L    eGFR if African American >60 >60 mL/min/1.73 m^2    eGFR if non African American >60 >60 mL/min/1.73 m^2   Troponin I   Result Value Ref Range    Troponin I 0.019 0.000 - 0.026 ng/mL   APTT   Result Value Ref Range    aPTT 28.3 21.0 - 32.0 sec   Protime-INR   Result Value Ref Range    Prothrombin Time 10.5 9.0 - 12.5 sec    INR 1.0 0.8 - 1.2   Brain natriuretic peptide   Result Value Ref Range     (H) 0 - 99 pg/mL         Imaging Results:  Imaging Results          X-Ray Chest AP Portable (Final result)  Result time 11/04/20 13:10:52    Final result by Milton Alejandra MD (11/04/20 13:10:52)                 Impression:      No acute radiographic abnormality in the chest.      Electronically signed by: Milton Alejandra  Date:    11/04/2020  Time:    13:10             Narrative:    EXAMINATION:  XR CHEST AP PORTABLE    CLINICAL HISTORY:  leg edema;    TECHNIQUE:  Single frontal view of the chest was performed.    COMPARISON:  Chest radiograph 07/28/2020 with priors    FINDINGS:  Cardiac leads project over the chest.  Cardiomediastinal silhouette is unchanged.  No acute or new airspace consolidative opacity.  No large effusion.  No pneumothorax.  The visualized osseous structures appear intact.                               US Lower Extremity Veins Bilateral (Final result)  Result time 11/04/20 12:32:54    Final result by Lawrence Schrader MD (11/04/20 12:32:54)                 Impression:       No evidence of deep venous thrombosis in either lower extremity.      Electronically signed by: Lawrence Schrader MD  Date:    11/04/2020  Time:    12:32             Narrative:    EXAMINATION:  US LOWER EXTREMITY VEINS BILATERAL    CLINICAL HISTORY:  Localized edema    TECHNIQUE:  Duplex and color flow Doppler and dynamic compression was performed of the bilateral lower extremity veins was performed.    COMPARISON:  None    FINDINGS:  Right thigh veins: The common femoral, femoral, popliteal, upper greater saphenous, and deep femoral veins are patent and free of thrombus. The veins are normally compressible and have normal phasic flow and augmentation response.    Right calf veins: The visualized calf veins are patent.    Left thigh veins: The common femoral, femoral, popliteal, upper greater saphenous, and deep femoral veins are patent and free of thrombus. The veins are normally compressible and have normal phasic flow and augmentation response.    Left calf veins: The visualized calf veins are patent.    Miscellaneous: None                               The EKG was ordered, reviewed, and independently interpreted by the ED provider.  Interpretation time: 11:23  Rate: 76 BPM  Rhythm: Sinus rhythm with occasional PVCs  Interpretation: Minimal voltage criteria for LVH, may be normal variant. No STEMI.           The Emergency Provider reviewed the vital signs and test results, which are outlined above.     ED Discussion     3:55 PM: Discussed case with Rut Mccoy NP (Hospital Medicine). Dr. Colón agrees with current care and management of pt and accepts admission.   Admitting Service: hospital medicine  Admitting Physician: Dr. Colón  Admit to: obs tele    3:55 PM: Re-evaluated pt. I have discussed test results, shared treatment plan, and the need for admission with patient and family at bedside. Pt and family express understanding at this time and agree with all information. All questions answered. Pt  and family have no further questions or concerns at this time. Pt is ready for admit.       Medical Decision Making:   Clinical Tests:   Lab Tests: Ordered and Reviewed  Radiological Study: Ordered and Reviewed  Medical Tests: Ordered and Reviewed           ED Medication(s):  Medications   allopurinoL tablet 100 mg (has no administration in time range)   atorvastatin tablet 40 mg (has no administration in time range)   losartan tablet 50 mg (has no administration in time range)   sodium chloride 0.9% flush 10 mL (has no administration in time range)   ondansetron injection 4 mg (has no administration in time range)   furosemide injection 40 mg (has no administration in time range)   glucose chewable tablet 16 g (has no administration in time range)   glucose chewable tablet 24 g (has no administration in time range)   dextrose 50% injection 12.5 g (has no administration in time range)   dextrose 50% injection 25 g (has no administration in time range)   glucagon (human recombinant) injection 1 mg (has no administration in time range)   insulin aspart U-100 pen 1-10 Units (has no administration in time range)   furosemide injection 80 mg (80 mg Intravenous Given 11/4/20 1308)   potassium chloride packet 20 mEq (20 mEq Oral Given 11/4/20 1555)       Scribe Attestation:   Scribe #1: I performed the above scribed service and the documentation accurately describes the services I performed. I attest to the accuracy of the note.     Attending:   Physician Attestation Statement for Scribe #1: I, Columba Lorenzo MD, personally performed the services described in this documentation, as scribed by Aissatou James, in my presence, and it is both accurate and complete.           Clinical Impression       ICD-10-CM ICD-9-CM   1. Bilateral lower extremity edema  R60.0 782.3   2. Bilateral leg edema  R60.0 782.3   3. Anasarca  R60.1 782.3   4. Morbid obesity  E66.01 278.01   5. Chronic hypertension  I10 401.9        Disposition:   Disposition: Placed in Observation  Condition: Radha Lorenzo MD  11/04/20 1619

## 2020-11-04 NOTE — SUBJECTIVE & OBJECTIVE
Past Medical History:   Diagnosis Date    Arthritis     Diabetes     Gout     HTN (hypertension)     Neuropathy     Obese        Past Surgical History:   Procedure Laterality Date    UMBILICAL HERNIA REPAIR         Review of patient's allergies indicates:  No Known Allergies    Current Facility-Administered Medications on File Prior to Encounter   Medication    hylan g-f 20 48 mg/6 mL injection 48 mg     Current Outpatient Medications on File Prior to Encounter   Medication Sig    ACCU-CHEK SOFTCLIX LANCETS Misc     acetaminophen (TYLENOL EXTRA STRENGTH) 500 MG tablet Take 1,000 mg by mouth every 6 (six) hours as needed for Pain.    allopurinoL (ZYLOPRIM) 100 MG tablet allopurinol 100 mg tablet    ascorbic acid, vitamin C, (VITAMIN C) 500 MG tablet Take 500 mg by mouth once daily.    atorvastatin (LIPITOR) 40 MG tablet atorvastatin 40 mg tablet   Take 1 tablet by mouth daily.    baclofen (LIORESAL) 10 MG tablet Take 1 tablet (10 mg total) by mouth 3 (three) times daily.    enoxaparin (LOVENOX) 40 mg/0.4 mL Syrg Inject 0.4 mLs (40 mg total) into the skin once daily.    ergocalciferol (ERGOCALCIFEROL) 50,000 unit Cap Take 1 capsule (50,000 Units total) by mouth every 7 days.    furosemide (LASIX) 40 MG tablet furosemide 40 mg tablet   Take 1 tablet by mouth daily.    gabapentin (NEURONTIN) 100 MG capsule Take 100 mg by mouth 3 (three) times daily.    gabapentin (NEURONTIN) 300 MG capsule Take 1 capsule (300 mg total) by mouth 3 (three) times daily. (Patient not taking: Reported on 11/4/2020)    insulin aspart U-100 (NOVOLOG) 100 unit/mL injection Inject into the skin as needed for High Blood Sugar.    losartan-hydrochlorothiazide 50-12.5 mg (HYZAAR) 50-12.5 mg per tablet Take 1 tablet by mouth once daily.    metformin (GLUCOPHAGE) 1000 MG tablet Take 1,000 mg by mouth 2 (two) times daily with meals.    ondansetron (ZOFRAN) 4 MG tablet Take 1 tablet (4 mg total) by mouth every 6 (six) hours.     PREVNAR 13, PF, 0.5 mL Syrg     pulse oximeter (PULSE OXIMETER) device Use twice daily at 8 AM and 3 PM and record the value in MyChart as directed.    pulse oximeter (PULSE OXIMETER) device by Apply Externally route 2 (two) times a day. Use twice daily at 8 AM and 3 PM and record the value in MyChart as directed.    thiamine 100 MG tablet Take 1 tablet (100 mg total) by mouth once daily.    tobramycin sulfate 0.3% (TOBREX) 0.3 % ophthalmic solution tobramycin 0.3 % eye drops     Family History     Problem Relation (Age of Onset)    Diabetes Sister    Hypertension Sister        Tobacco Use    Smoking status: Never Smoker    Smokeless tobacco: Never Used   Substance and Sexual Activity    Alcohol use: No    Drug use: No    Sexual activity: Not Currently     Partners: Male     Birth control/protection: None     Review of Systems   Constitutional: Negative for activity change, appetite change, chills, diaphoresis, fatigue, fever and unexpected weight change.   HENT: Negative.    Respiratory: Negative for cough, shortness of breath and wheezing.    Cardiovascular: Positive for leg swelling. Negative for chest pain and palpitations.   Gastrointestinal: Negative for abdominal pain, constipation, diarrhea, nausea and vomiting.   Genitourinary: Negative for dysuria and frequency.   Musculoskeletal: Positive for gait problem.   Skin: Negative for pallor, rash and wound.   Neurological: Positive for weakness. Negative for dizziness, syncope and facial asymmetry.     Objective:     Vital Signs (Most Recent):  Temp: 98 °F (36.7 °C) (11/04/20 1149)  Pulse: 100 (11/04/20 1602)  Resp: 20 (11/04/20 1602)  BP: (!) 183/86 (11/04/20 1602)  SpO2: 99 % (11/04/20 1602) Vital Signs (24h Range):  Temp:  [98 °F (36.7 °C)] 98 °F (36.7 °C)  Pulse:  [] 100  Resp:  [15-20] 20  SpO2:  [95 %-100 %] 99 %  BP: (118-183)/() 183/86     Weight: 81.2 kg (179 lb)  Body mass index is 29.79 kg/m².    Physical Exam  Constitutional:        Appearance: She is well-developed. She is obese.      Comments: Chronically ill appearing   HENT:      Head: Normocephalic and atraumatic.      Nose: Nose normal.   Eyes:      General: No scleral icterus.     Conjunctiva/sclera: Conjunctivae normal.   Neck:      Musculoskeletal: Normal range of motion and neck supple.   Cardiovascular:      Rate and Rhythm: Normal rate and regular rhythm.      Heart sounds: Normal heart sounds. No murmur. No friction rub. No gallop.    Pulmonary:      Effort: Pulmonary effort is normal.      Breath sounds: Normal breath sounds.   Abdominal:      General: Bowel sounds are normal. There is no distension.      Palpations: Abdomen is soft.      Tenderness: There is no abdominal tenderness. There is no guarding.   Musculoskeletal: Normal range of motion.      Right lower leg: Edema (+3 pitting edema) present.      Left lower leg: Edema (+3 pitting edema) present.   Skin:     General: Skin is warm and dry.   Neurological:      Mental Status: She is alert and oriented to person, place, and time.      Comments: Generalized weakness   Psychiatric:         Behavior: Behavior normal.             Significant Labs:   CBC:   Recent Labs   Lab 11/04/20  1308   WBC 7.74   HGB 10.2*   HCT 33.1*   *     CMP:   Recent Labs   Lab 11/04/20  1440      K 3.1*      CO2 24   GLU 98   BUN 12   CREATININE 0.9   CALCIUM 9.7   PROT 6.9   ALBUMIN 2.0*   BILITOT 0.3   ALKPHOS 105   AST 11   ALT 8*   ANIONGAP 7*   EGFRNONAA >60     Troponin:   Recent Labs   Lab 11/04/20  1440   TROPONINI 0.019     All pertinent labs within the past 24 hours have been reviewed.    Significant Imaging: I have reviewed all pertinent imaging results/findings within the past 24 hours.

## 2020-11-04 NOTE — PROGRESS NOTES
Subjective:    Patient ID:  Dilia Talley is a 74 y.o. female who presents for follow-up of Zio monitor/HTN      HPI   Ms. Talley is a 74 year old female patient whose current medical conditions include HTN, DM type II, and hyperlipidemia who presents today for follow-up. Patient previously seen by me and had Holter monitor and echo ordered for further evaluation of syncope. She returns today and states she is doing ok. Main complaint is worsening BLE edema. States it has gotten so bad that it makes her difficult for her to move her legs and has caused her right knee to hurt/throb at night. Unable to wear shoes. Denies any associated symptoms. No worsening SOB, PND, or orthopnea. No excessive weight gain. No misty chest pain, heaviness, or tightness. No near syncope, syncope, or falls. BP ok today in office. Patient reports compliance with her medications. Still residing at [facility].    She accidentally threw her Zio monitor in the trash. Will arrange for repeat.      Review of Systems   Constitution: Positive for malaise/fatigue. Negative for chills, decreased appetite and fever.   HENT: Negative for congestion, hoarse voice and sore throat.    Eyes: Negative for blurred vision and discharge.   Cardiovascular: Positive for leg swelling. Negative for chest pain, claudication, cyanosis, dyspnea on exertion, irregular heartbeat, near-syncope, orthopnea, palpitations and paroxysmal nocturnal dyspnea.   Respiratory: Negative for cough, hemoptysis, shortness of breath, snoring, sputum production and wheezing.    Endocrine: Negative for cold intolerance and heat intolerance.   Hematologic/Lymphatic: Negative for bleeding problem. Does not bruise/bleed easily.   Skin: Negative for rash.   Musculoskeletal: Positive for arthritis and joint pain. Negative for back pain, joint swelling, muscle cramps, muscle weakness and myalgias.   Gastrointestinal: Negative for abdominal pain, constipation, diarrhea, heartburn, melena  "and nausea.   Genitourinary: Negative for hematuria.   Neurological: Negative for dizziness, focal weakness, headaches, light-headedness, loss of balance, numbness, paresthesias, seizures and weakness.   Psychiatric/Behavioral: Negative for memory loss. The patient does not have insomnia.    Allergic/Immunologic: Negative for hives.     /64 (BP Location: Right arm, Patient Position: Sitting)   Pulse 70   Ht 5' 5" (1.651 m)   Wt 81.6 kg (179 lb 14.3 oz)   SpO2 99%   BMI 29.94 kg/m²     Objective:    Physical Exam   Constitutional: She is oriented to person, place, and time. She appears well-developed and well-nourished. No distress.   HENT:   Head: Normocephalic and atraumatic.   Eyes: Pupils are equal, round, and reactive to light. Right eye exhibits no discharge. Left eye exhibits no discharge.   Neck: Neck supple. No JVD present.   Cardiovascular: Normal rate, regular rhythm, S1 normal, S2 normal, normal heart sounds and intact distal pulses.  Occasional extrasystoles are present.   No murmur heard.  Pulmonary/Chest: Effort normal. No respiratory distress. She has no wheezes.   Slightly diminished BS at bases   Abdominal: Soft. She exhibits no distension. There is no rebound.   Musculoskeletal:         General: Edema (3-4+ pitting BLE up to thighs) present.   Neurological: She is alert and oriented to person, place, and time.   Skin: Skin is warm and dry. She is not diaphoretic. No erythema.   Psychiatric: She has a normal mood and affect. Her behavior is normal. Thought content normal.   Nursing note and vitals reviewed.    Echo Results  · The left ventricle is normal in size with normal systolic function. The estimated ejection fraction is 65%.  · Grade I diastolic dysfunction.  · Normal right ventricular systolic function.  · The estimated PA systolic pressure is 38 mmHg  Assessment:       1. Bilateral edema of lower extremity    2. Irregular heartbeat    3. Bradycardia    4. 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 Acute on chronic diastolic congestive heart failure      Presents for f/u. Doing ok but has significant BLE edema, up to thighs. Likely has component of dependent edema and ? Hypoalbuminemia however swelling is much worse when compared to her prior visit with me. In light of the above, will send to ED for further workup/diuresis/labs. Will arrange repeat Zio monitor and likely MPI stress test once discharged.  Plan:   [place] [facility] [place]  -Will arrange repeat Zio and MPi stress test once discharged

## 2020-11-04 NOTE — HPI
Ms. Talley is a 74 year old female patient whose current medical conditions include HTN, DM type II, and hyperlipidemia who presents to the ED on advise of Cardiology due to worsened leg swelling. Pt is mostly wheelchair bound and resides in the nursing home. Pt has difficulting moving her legs and has caused her right knee to hurt/throb at night. Unable to wear shoes. Denies any associated symptoms. No worsening SOB, PND, or orthopnea. No excessive weight gain. No misty chest pain, heaviness, or tightness. No near syncope, syncope, or falls. Patient reports compliance with her medications. Still residing at [facility].  V/S are stable and SpO2 100 %, respiratory rate 16 - 18  CXR - no acute cardiopulmonary findings - no opacities. BLE Venous US was negative for DVT.   CBC is stable, CMP is stable except for K+ 3.1. Pt is placed on Observation for diuresis for decompensated diastolic heart failure

## 2020-11-04 NOTE — ASSESSMENT & PLAN NOTE
Telemetry monitoring, daily weights, low sodium diet, fluid restriction, BNP in AM,  Lasix IV, continue losartan  2 d ECHO from 9/29/2020 - grade I diastolic dysfunction

## 2020-11-04 NOTE — H&P
[facility] Hospital Medicine  History & Physical    Patient Name: Dilia Talley  MRN: [id number]  Admission Date: 11/4/2020  Attending Physician: Sonu Colón MD   Primary Care Provider: Leif Townsend MD         Patient information was obtained from patient, past medical records and ER records.     Subjective:     Principal Problem:Acute on chronic diastolic heart failure    Chief Complaint:   Chief Complaint   Patient presents with    Leg Swelling     BLE, sent by MD        HPI: Ms. Talley is a 74 year old female patient whose current medical conditions include HTN, DM type II, and hyperlipidemia who presents to the ED on advise of Cardiology due to worsened leg swelling. Pt is mostly wheelchair bound and resides in the nursing home. Pt has difficulting moving her legs and has caused her right knee to hurt/throb at night. Unable to wear shoes. Denies any associated symptoms. No worsening SOB, PND, or orthopnea. No excessive weight gain. No misty chest pain, heaviness, or tightness. No near syncope, syncope, or falls. Patient reports compliance with her medications. Still residing at [facility].  V/S are stable and SpO2 100 %, respiratory rate 16 - 18  CXR - no acute cardiopulmonary findings - no opacities. BLE Venous US was negative for DVT.   CBC is stable, CMP is stable except for K+ 3.1. Pt is placed on Observation for diuresis for decompensated diastolic heart failure    Past Medical History:   Diagnosis Date    Arthritis     Diabetes     Gout     HTN (hypertension)     Neuropathy     Obese        Past Surgical History:   Procedure Laterality Date    UMBILICAL HERNIA REPAIR         Review of patient's allergies indicates:  No Known Allergies    Current Facility-Administered Medications on File Prior to Encounter   Medication    hylan g-f 20 48 mg/6 mL injection 48 mg     Current Outpatient Medications on File Prior to Encounter   Medication Sig    ACCU-CHEK SOFTCLIX LANCETS  Misc     acetaminophen (TYLENOL EXTRA STRENGTH) 500 MG tablet Take 1,000 mg by mouth every 6 (six) hours as needed for Pain.    allopurinoL (ZYLOPRIM) 100 MG tablet allopurinol 100 mg tablet    ascorbic acid, vitamin C, (VITAMIN C) 500 MG tablet Take 500 mg by mouth once daily.    atorvastatin (LIPITOR) 40 MG tablet atorvastatin 40 mg tablet   Take 1 tablet by mouth daily.    baclofen (LIORESAL) 10 MG tablet Take 1 tablet (10 mg total) by mouth 3 (three) times daily.    enoxaparin (LOVENOX) 40 mg/0.4 mL Syrg Inject 0.4 mLs (40 mg total) into the skin once daily.    ergocalciferol (ERGOCALCIFEROL) 50,000 unit Cap Take 1 capsule (50,000 Units total) by mouth every 7 days.    furosemide (LASIX) 40 MG tablet furosemide 40 mg tablet   Take 1 tablet by mouth daily.    gabapentin (NEURONTIN) 100 MG capsule Take 100 mg by mouth 3 (three) times daily.    gabapentin (NEURONTIN) 300 MG capsule Take 1 capsule (300 mg total) by mouth 3 (three) times daily. (Patient not taking: Reported on 11/4/2020)    insulin aspart U-100 (NOVOLOG) 100 unit/mL injection Inject into the skin as needed for High Blood Sugar.    losartan-hydrochlorothiazide 50-12.5 mg (HYZAAR) 50-12.5 mg per tablet Take 1 tablet by mouth once daily.    metformin (GLUCOPHAGE) 1000 MG tablet Take 1,000 mg by mouth 2 (two) times daily with meals.    ondansetron (ZOFRAN) 4 MG tablet Take 1 tablet (4 mg total) by mouth every 6 (six) hours.    PREVNAR 13, PF, 0.5 mL Syrg     pulse oximeter (PULSE OXIMETER) device Use twice daily at 8 AM and 3 PM and record the value in MyChart as directed.    pulse oximeter (PULSE OXIMETER) device by Apply Externally route 2 (two) times a day. Use twice daily at 8 AM and 3 PM and record the value in MyChart as directed.    thiamine 100 MG tablet Take 1 tablet (100 mg total) by mouth once daily.    tobramycin sulfate 0.3% (TOBREX) 0.3 % ophthalmic solution tobramycin 0.3 % eye drops     Family History     Problem  Relation (Age of Onset)    Diabetes Sister    Hypertension Sister        Tobacco Use    Smoking status: Never Smoker    Smokeless tobacco: Never Used   Substance and Sexual Activity    Alcohol use: No    Drug use: No    Sexual activity: Not Currently     Partners: Male     Birth control/protection: None     Review of Systems   Constitutional: Negative for activity change, appetite change, chills, diaphoresis, fatigue, fever and unexpected weight change.   HENT: Negative.    Respiratory: Negative for cough, shortness of breath and wheezing.    Cardiovascular: Positive for leg swelling. Negative for chest pain and palpitations.   Gastrointestinal: Negative for abdominal pain, constipation, diarrhea, nausea and vomiting.   Genitourinary: Negative for dysuria and frequency.   Musculoskeletal: Positive for gait problem.   Skin: Negative for pallor, rash and wound.   Neurological: Positive for weakness. Negative for dizziness, syncope and facial asymmetry.     Objective:     Vital Signs (Most Recent):  Temp: 98 °F (36.7 °C) (11/04/20 1149)  Pulse: 100 (11/04/20 1602)  Resp: 20 (11/04/20 1602)  BP: (!) 183/86 (11/04/20 1602)  SpO2: 99 % (11/04/20 1602) Vital Signs (24h Range):  Temp:  [98 °F (36.7 °C)] 98 °F (36.7 °C)  Pulse:  [] 100  Resp:  [15-20] 20  SpO2:  [95 %-100 %] 99 %  BP: (118-183)/() 183/86     Weight: 81.2 kg (179 lb)  Body mass index is 29.79 kg/m².    Physical Exam  Constitutional:       Appearance: She is well-developed. She is obese.      Comments: Chronically ill appearing   HENT:      Head: Normocephalic and atraumatic.      Nose: Nose normal.   Eyes:      General: No scleral icterus.     Conjunctiva/sclera: Conjunctivae normal.   Neck:      Musculoskeletal: Normal range of motion and neck supple.   Cardiovascular:      Rate and Rhythm: Normal rate and regular rhythm.      Heart sounds: Normal heart sounds. No murmur. No friction rub. No gallop.    Pulmonary:      Effort: Pulmonary  effort is normal.      Breath sounds: Normal breath sounds.   Abdominal:      General: Bowel sounds are normal. There is no distension.      Palpations: Abdomen is soft.      Tenderness: There is no abdominal tenderness. There is no guarding.   Musculoskeletal: Normal range of motion.      Right lower leg: Edema (+3 pitting edema) present.      Left lower leg: Edema (+3 pitting edema) present.   Skin:     General: Skin is warm and dry.   Neurological:      Mental Status: She is alert and oriented to person, place, and time.      Comments: Generalized weakness   Psychiatric:         Behavior: Behavior normal.             Significant Labs:   CBC:   Recent Labs   Lab 11/04/20  1308   WBC 7.74   HGB 10.2*   HCT 33.1*   *     CMP:   Recent Labs   Lab 11/04/20  1440      K 3.1*      CO2 24   GLU 98   BUN 12   CREATININE 0.9   CALCIUM 9.7   PROT 6.9   ALBUMIN 2.0*   BILITOT 0.3   ALKPHOS 105   AST 11   ALT 8*   ANIONGAP 7*   EGFRNONAA >60     Troponin:   Recent Labs   Lab 11/04/20  1440   TROPONINI 0.019     All pertinent labs within the past 24 hours have been reviewed.    Significant Imaging: I have reviewed all pertinent imaging results/findings within the past 24 hours.    Assessment/Plan:     Essential hypertension  Continue losartan  Diuresis with IV lasix      Acute on chronic diastolic heart failure  Telemetry monitoring, daily weights, low sodium diet, fluid restriction, BNP in AM,  Lasix IV, continue losartan  2 d ECHO from 9/29/2020 - grade I diastolic dysfunction          Diabetes mellitus type 2 with neurological manifestations  accuchecks  Sliding scale insulin  Hold metformin for now  Diabetic/cardiac diet        VTE Risk Mitigation (From admission, onward)         Ordered     enoxaparin injection 40 mg  Every 24 hours      11/04/20 1756     IP VTE HIGH RISK PATIENT  Once      11/04/20 1619     Place sequential compression device  Until discontinued      11/04/20 1619                    Rut Mccoy NP  Department of Hospital Medicine   [facility]

## 2020-11-04 NOTE — PLAN OF CARE
11/04/20 1420   ED Admissions Case Approval   ED Admissions Case Approval CM Approved       THIS PATIENT MEETS OBSERVATION CRITERIA FOR POSSIBLE HOSPITALIZATION.

## 2020-11-05 ENCOUNTER — DOCUMENTATION ONLY (OUTPATIENT)
Dept: CARDIOLOGY | Facility: CLINIC | Age: 75
End: 2020-11-05

## 2020-11-05 VITALS
RESPIRATION RATE: 18 BRPM | OXYGEN SATURATION: 99 % | TEMPERATURE: 97 F | SYSTOLIC BLOOD PRESSURE: 150 MMHG | WEIGHT: 193.31 LBS | HEIGHT: 65 IN | HEART RATE: 69 BPM | BODY MASS INDEX: 32.21 KG/M2 | DIASTOLIC BLOOD PRESSURE: 65 MMHG

## 2020-11-05 PROBLEM — I50.33 ACUTE ON CHRONIC DIASTOLIC HEART FAILURE: Status: RESOLVED | Noted: 2020-11-04 | Resolved: 2020-11-05

## 2020-11-05 PROBLEM — E66.01 MORBID OBESITY: Status: ACTIVE | Noted: 2020-11-05

## 2020-11-05 PROBLEM — R60.1 ANASARCA: Status: ACTIVE | Noted: 2020-11-05

## 2020-11-05 PROBLEM — R60.1 ANASARCA: Status: RESOLVED | Noted: 2020-11-05 | Resolved: 2020-11-05

## 2020-11-05 LAB
ANION GAP SERPL CALC-SCNC: 6 MMOL/L (ref 8–16)
BASOPHILS # BLD AUTO: 0.06 K/UL (ref 0–0.2)
BASOPHILS NFR BLD: 0.8 % (ref 0–1.9)
BUN SERPL-MCNC: 11 MG/DL (ref 8–23)
CALCIUM SERPL-MCNC: 9.8 MG/DL (ref 8.7–10.5)
CHLORIDE SERPL-SCNC: 105 MMOL/L (ref 95–110)
CO2 SERPL-SCNC: 27 MMOL/L (ref 23–29)
CREAT SERPL-MCNC: 0.8 MG/DL (ref 0.5–1.4)
DIFFERENTIAL METHOD: ABNORMAL
EOSINOPHIL # BLD AUTO: 0.2 K/UL (ref 0–0.5)
EOSINOPHIL NFR BLD: 2.5 % (ref 0–8)
ERYTHROCYTE [DISTWIDTH] IN BLOOD BY AUTOMATED COUNT: 14 % (ref 11.5–14.5)
EST. GFR  (AFRICAN AMERICAN): >60 ML/MIN/1.73 M^2
EST. GFR  (NON AFRICAN AMERICAN): >60 ML/MIN/1.73 M^2
GLUCOSE SERPL-MCNC: 90 MG/DL (ref 70–110)
HCT VFR BLD AUTO: 33.9 % (ref 37–48.5)
HGB BLD-MCNC: 10.4 G/DL (ref 12–16)
IMM GRANULOCYTES # BLD AUTO: 0.03 K/UL (ref 0–0.04)
IMM GRANULOCYTES NFR BLD AUTO: 0.4 % (ref 0–0.5)
LYMPHOCYTES # BLD AUTO: 2.6 K/UL (ref 1–4.8)
LYMPHOCYTES NFR BLD: 36.5 % (ref 18–48)
MAGNESIUM SERPL-MCNC: 1.4 MG/DL (ref 1.6–2.6)
MCH RBC QN AUTO: 28.7 PG (ref 27–31)
MCHC RBC AUTO-ENTMCNC: 30.7 G/DL (ref 32–36)
MCV RBC AUTO: 93 FL (ref 82–98)
MONOCYTES # BLD AUTO: 0.7 K/UL (ref 0.3–1)
MONOCYTES NFR BLD: 9.9 % (ref 4–15)
NEUTROPHILS # BLD AUTO: 3.6 K/UL (ref 1.8–7.7)
NEUTROPHILS NFR BLD: 49.9 % (ref 38–73)
NRBC BLD-RTO: 0 /100 WBC
PLATELET # BLD AUTO: 474 K/UL (ref 150–350)
PMV BLD AUTO: 9.6 FL (ref 9.2–12.9)
POCT GLUCOSE: 108 MG/DL (ref 70–110)
POCT GLUCOSE: 96 MG/DL (ref 70–110)
POTASSIUM SERPL-SCNC: 2.8 MMOL/L (ref 3.5–5.1)
RBC # BLD AUTO: 3.63 M/UL (ref 4–5.4)
SODIUM SERPL-SCNC: 138 MMOL/L (ref 136–145)
WBC # BLD AUTO: 7.16 K/UL (ref 3.9–12.7)

## 2020-11-05 PROCEDURE — 80048 BASIC METABOLIC PNL TOTAL CA: CPT

## 2020-11-05 PROCEDURE — 85025 COMPLETE CBC W/AUTO DIFF WBC: CPT

## 2020-11-05 PROCEDURE — 25000003 PHARM REV CODE 250: Performed by: NURSE PRACTITIONER

## 2020-11-05 PROCEDURE — 36415 COLL VENOUS BLD VENIPUNCTURE: CPT

## 2020-11-05 PROCEDURE — 96376 TX/PRO/DX INJ SAME DRUG ADON: CPT

## 2020-11-05 PROCEDURE — G0378 HOSPITAL OBSERVATION PER HR: HCPCS

## 2020-11-05 PROCEDURE — 25000003 PHARM REV CODE 250: Performed by: INTERNAL MEDICINE

## 2020-11-05 PROCEDURE — 63600175 PHARM REV CODE 636 W HCPCS: Performed by: NURSE PRACTITIONER

## 2020-11-05 PROCEDURE — 83735 ASSAY OF MAGNESIUM: CPT

## 2020-11-05 RX ORDER — POTASSIUM CHLORIDE 20 MEQ/1
40 TABLET, EXTENDED RELEASE ORAL
Status: COMPLETED | OUTPATIENT
Start: 2020-11-05 | End: 2020-11-05

## 2020-11-05 RX ADMIN — ALLOPURINOL 100 MG: 100 TABLET ORAL at 09:11

## 2020-11-05 RX ADMIN — POTASSIUM CHLORIDE 40 MEQ: 1500 TABLET, EXTENDED RELEASE ORAL at 09:11

## 2020-11-05 RX ADMIN — LOSARTAN POTASSIUM 50 MG: 50 TABLET, FILM COATED ORAL at 09:11

## 2020-11-05 RX ADMIN — FAMOTIDINE 20 MG: 20 TABLET ORAL at 09:11

## 2020-11-05 RX ADMIN — POTASSIUM CHLORIDE 40 MEQ: 1500 TABLET, EXTENDED RELEASE ORAL at 11:11

## 2020-11-05 RX ADMIN — POTASSIUM CHLORIDE 40 MEQ: 1500 TABLET, EXTENDED RELEASE ORAL at 10:11

## 2020-11-05 RX ADMIN — ACETAMINOPHEN 650 MG: 325 TABLET ORAL at 06:11

## 2020-11-05 RX ADMIN — FUROSEMIDE 40 MG: 10 INJECTION, SOLUTION INTRAVENOUS at 12:11

## 2020-11-05 RX ADMIN — FUROSEMIDE 40 MG: 10 INJECTION, SOLUTION INTRAVENOUS at 11:11

## 2020-11-05 NOTE — PLAN OF CARE
11/05/20 1749   Final Note   Assessment Type Final Discharge Note   Anticipated Discharge Disposition correction Nu   Right Care Referral Info   Post Acute Recommendation Other   Referral Type correction Nursing Home   Facility Name [facility]

## 2020-11-05 NOTE — PLAN OF CARE
11/05/20 1021   Discharge Assessment   Assessment Type Discharge Planning Assessment   Confirmed/corrected address and phone number on facesheet? Yes   Assessment information obtained from? Patient;Medical Record   Prior to hospitilization cognitive status: Alert/Oriented   Prior to hospitalization functional status: Independent;Assistive Equipment;Wheelchair Bound   Current cognitive status: Alert/Oriented   Current Functional Status: Independent;Assistive Equipment;Wheelchair Bound   Lives With facility resident   Able to Return to Prior Arrangements yes   Is patient able to care for self after discharge? No   Who are your caregiver(s) and their phone number(s)? Abe Alexis (daughter) [phone]   Patient's perception of discharge disposition nursing home   Readmission Within the Last 30 Days no previous admission in last 30 days   Patient currently being followed by outpatient case management? No   Patient currently receives any other outside agency services? Yes   How many hours a day does the patient receive services? 24   Name and contact number of agency or person providing outside services [facility]   Is it the patient/care giver preference to resume care with the current outside agency? Yes   Equipment Currently Used at Home wheelchair;glucometer   Do you have any problems affording any of your prescribed medications? No   Is the patient taking medications as prescribed? yes   Does the patient have transportation home? Yes   Transportation Anticipated family or friend will provide   Does the patient receive services at the [facility]? No   Discharge Plan A Return to nursing home   Discharge Plan B Return to Nursing Home   Patient/Family in Agreement with Plan yes     Met with pt at bedside for DC assessment. Pt is a resident of [facility] and is wheelchair bound. Pt stated that she uses a glucometer to track her blood sugar. No CM DC needs identified at this time. SWer provided a  transitional care folder, information on advanced directives, information on pharmacy bedside delivery, and discharge planning begins on admission with contact information for any needs/questions.    PCP: MD Judd Bhatt LMSW 11/5/2020 10:26 AM

## 2020-11-05 NOTE — PLAN OF CARE
Problem: Fall Injury Risk  Goal: Absence of Fall and Fall-Related Injury  Outcome: Ongoing, Progressing     Problem: Adult Inpatient Plan of Care  Goal: Plan of Care Review  Outcome: Ongoing, Progressing  Goal: Patient-Specific Goal (Individualization)  Outcome: Ongoing, Progressing  Goal: Absence of Hospital-Acquired Illness or Injury  Outcome: Ongoing, Progressing  Goal: Optimal Comfort and Wellbeing  Outcome: Ongoing, Progressing  Goal: Readiness for Transition of Care  Outcome: Ongoing, Progressing  Goal: Rounds/Family Conference  Outcome: Ongoing, Progressing     Problem: Diabetes Comorbidity  Goal: Blood Glucose Level Within Desired Range  Outcome: Ongoing, Progressing     Problem: Adjustment to Illness (Sepsis/Septic Shock)  Goal: Optimal Coping  Outcome: Ongoing, Progressing     Problem: Bleeding (Sepsis/Septic Shock)  Goal: Absence of Bleeding  Outcome: Ongoing, Progressing     Problem: Glycemic Control Impaired (Sepsis/Septic Shock)  Goal: Blood Glucose Level Within Desired Range  Outcome: Ongoing, Progressing     Problem: Hemodynamic Instability (Sepsis/Septic Shock)  Goal: Effective Tissue Perfusion  Outcome: Ongoing, Progressing     Problem: Infection (Sepsis/Septic Shock)  Goal: Absence of Infection Signs/Symptoms  Outcome: Ongoing, Progressing     Problem: Nutrition Impaired (Sepsis/Septic Shock)  Goal: Optimal Nutrition Intake  Outcome: Ongoing, Progressing     Problem: Respiratory Compromise (Sepsis/Septic Shock)  Goal: Effective Oxygenation and Ventilation  Outcome: Ongoing, Progressing     Problem: Skin Injury Risk Increased  Goal: Skin Health and Integrity  Outcome: Ongoing, Progressing

## 2020-11-05 NOTE — PLAN OF CARE
Referral sent to [facility] via [facility]. Called for  time. [facility] reported that their  is picking up another resident and they will call back with a  time.  Judd Mccollum LMSW 11/5/2020 12:34 PM

## 2020-11-05 NOTE — PROGRESS NOTES
Spoke with pt at bedside. I have reviewed information about the [facility] heart failure management program with the patient including a 48 hour f/u call after discharge to check on patient and a f/u clinic appt to be scheduled  within a week after discharge.    CHF education with pt in detail  Provided and reviewed 8 step for heart failure.  Recommend 2 gram sodium restriction and 1500cc fluid restriction.  Pt informed lives at [facility] and meals provided there.   Encourage physical activity with graded exercise program.  Requested patient to weigh themselves daily, and to notify us if their weight increases by more than 2 lbs in 1 day or 5 lbs in 1 week.  Pt informed she weighs daily at [facility].      Watch for these Signs and Symptoms  If any of these occur, contact your doctor immediately:   Increase in shortness of breath with movement   Increase in swelling in your legs and ankles   Weight gain of more than 2 pounds in a night or 5 pounds in a week   Difficulty breathing when you are lying down   Worsening fatigue or tiredness   Stomach bloating, a full feeling or a loss of appetite   Increased coughing--especially when you are lying down    Call 911 if any of the followin: Worsening chest tightness or chest pain  2: Cough with pink, frothy sputum      [facility] f/u appt scheduled with KYREE Garcia and notified the [facility] to set up transportation.

## 2020-11-05 NOTE — CONSULTS
"Food & Nutrition  Education    Diet Education: Low Sodium; Fluid Restriction Nutrition Therapy  Time Spent: 15 minutes  Learners: Patient       Nutrition Education provided with handouts: "Low Sodium Nutrition Therapy" & "Fluid Restriction Nutrition Therapy" from nutritioncaremanual.org       Comments: RD and Dietetic intern completed nutrition education. She lives at a nursing home with meals provided to her for BLD along with snacks. Per patient, she does not have any restrictions for her current diet at the nursing home and states that she receives the same meal as all other residents. She also explained that her DM is being managed by staff at nursing home with CBG checks and rx administration. RD explained Na role in fluid accumulation along with excessive po fluid intake. She stated that she consumed water po frequently throughout the day. UTD the amount of water. RD encouraged fluid intake for overall physiological process, but to monitor the amount of fluid po in association with fluid accumulation in BLE.       All questions and concerns answered. Dietitian's contact information provided.       Follow-Up: If needed.     Please Re-consult as needed    Courtney Buck RD, LDN     "

## 2020-11-06 NOTE — DISCHARGE SUMMARY
[facility] Hospital Medicine  Discharge Summary      Patient Name: Dilia Talley  MRN: [id number]  Admission Date: 11/4/2020  Hospital Length of Stay: 0 days  Discharge Date and Time: 11/5/2020  2:53 PM  Attending Physician: No att. providers found   Discharging Provider: Sonu Colón MD  Primary Care Provider: Leif Townsend MD      HPI:   Ms. Talley is a 74 year old female patient whose current medical conditions include HTN, DM type II, and hyperlipidemia who presents to the ED on advise of Cardiology due to worsened leg swelling. Pt is mostly wheelchair bound and resides in the nursing home. Pt has difficulting moving her legs and has caused her right knee to hurt/throb at night. Unable to wear shoes. Denies any associated symptoms. No worsening SOB, PND, or orthopnea. No excessive weight gain. No misty chest pain, heaviness, or tightness. No near syncope, syncope, or falls. Patient reports compliance with her medications. Still residing at [facility].  V/S are stable and SpO2 100 %, respiratory rate 16 - 18  CXR - no acute cardiopulmonary findings - no opacities. BLE Venous US was negative for DVT.   CBC is stable, CMP is stable except for K+ 3.1. Pt is placed on Observation for diuresis for decompensated diastolic heart failure    * No surgery found *      Hospital Course:   Patient 75 yo female placed in observation due to Lower ext edema. Patient diuresed and improved. Patient no sob, and is discharged to facility for ongoing diuresis. Patient seen and examined prior to discharge.      Consults:   Consults (From admission, onward)        Status Ordering Provider     Inpatient consult to Registered Dietitian/Nutritionist  Once     Provider:  (Not yet assigned)    Completed SEVERIANO HOPKINS     Inpatient consult to Social Work/Case Management  Once     Provider:  (Not yet assigned)    Completed SEVERIANO HOPKINS          No new Assessment & Plan notes have been filed under this  hospital service since the last note was generated.  Service: Hospital Medicine    Final Active Diagnoses:    Diagnosis Date Noted POA    Morbid obesity [E66.01] 11/05/2020 Yes    Essential hypertension [I10] 11/04/2020 Yes    Diabetes mellitus type 2 with neurological manifestations [E11.49] 05/28/2020 Yes    DJD (degenerative joint disease) [M19.90] 05/31/2014 Yes      Problems Resolved During this Admission:    Diagnosis Date Noted Date Resolved POA    PRINCIPAL PROBLEM:  Acute on chronic diastolic heart failure [I50.33] 11/04/2020 11/05/2020 Yes    Anasarca [R60.1] 11/05/2020 11/05/2020 Yes       Discharged Condition: stable    Disposition: Long Term Care    Follow Up:  Follow-up Information     Leif Townsend MD In 3 days.    Specialty: Family Medicine  Contact information:  [address] [address]  [phone]                 Patient Instructions:      Ambulatory referral/consult to [facility]   Standing Status: Future   Referral Priority: Routine Referral Type: Consultation   Referral Reason: Specialty Services Required   Requested Specialty: Cardiology   Number of Visits Requested: 1     Diet diabetic     Activity as tolerated       Significant Diagnostic Studies: Labs:   BMP:   Recent Labs   Lab 11/04/20  1440 11/05/20  0704   GLU 98 90    138   K 3.1* 2.8*    105   CO2 24 27   BUN 12 11   CREATININE 0.9 0.8   CALCIUM 9.7 9.8   MG 1.6 1.4*   , CMP   Recent Labs   Lab 11/04/20  1440 11/05/20  0704    138   K 3.1* 2.8*    105   CO2 24 27   GLU 98 90   BUN 12 11   CREATININE 0.9 0.8   CALCIUM 9.7 9.8   PROT 6.9  --    ALBUMIN 2.0*  --    BILITOT 0.3  --    ALKPHOS 105  --    AST 11  --    ALT 8*  --    ANIONGAP 7* 6*   ESTGFRAFRICA >60 >60   EGFRNONAA >60 >60   , CBC   Recent Labs   Lab 11/04/20  1308 11/05/20  0704   WBC 7.74 7.16   HGB 10.2* 10.4*   HCT 33.1* 33.9*   * 474*   , Troponin   Recent Labs   Lab 11/04/20  1440    TROPONINI 0.019    and All labs within the past 24 hours have been reviewed    Pending Diagnostic Studies:     None         Medications:  Reconciled Home Medications:      Medication List      CHANGE how you take these medications    gabapentin 100 MG capsule  Commonly known as: NEURONTIN  Take 100 mg by mouth 3 (three) times daily.  What changed: Another medication with the same name was removed. Continue taking this medication, and follow the directions you see here.     pulse oximeter device  Commonly known as: pulse oximeter  by Apply Externally route 2 (two) times a day. Use twice daily at 8 AM and 3 PM and record the [facility] as directed.  What changed: Another medication with the same name was removed. Continue taking this medication, and follow the directions you see here.        CONTINUE taking these medications    ACCU-CHEK SOFTCLIX LANCETS Misc  Generic drug: lancets     allopurinoL 100 MG tablet  Commonly known as: ZYLOPRIM  allopurinol 100 mg tablet     atorvastatin 40 MG tablet  Commonly known as: LIPITOR  atorvastatin 40 mg tablet   Take 1 tablet by mouth daily.     baclofen 10 MG tablet  Commonly known as: LIORESAL  Take 1 tablet (10 mg total) by mouth 3 (three) times daily.     enoxaparin 40 mg/0.4 mL Syrg  Commonly known as: LOVENOX  Inject 0.4 mLs (40 mg total) into the skin once daily.     ergocalciferol 50,000 unit Cap  Commonly known as: ERGOCALCIFEROL  Take 1 capsule (50,000 Units total) by mouth every 7 days.     furosemide 40 MG tablet  Commonly known as: LASIX  furosemide 40 mg tablet   Take 1 tablet by mouth daily.     insulin aspart U-100 100 unit/mL injection  Commonly known as: NOVOLOG  Inject into the skin as needed for High Blood Sugar.     losartan-hydrochlorothiazide 50-12.5 mg 50-12.5 mg per tablet  Commonly known as: HYZAAR  Take 1 tablet by mouth once daily.     metFORMIN 1000 MG tablet  Commonly known as: GLUCOPHAGE  Take 1,000 mg by mouth 2 (two) times daily with  meals.     ondansetron 4 MG tablet  Commonly known as: ZOFRAN  Take 1 tablet (4 mg total) by mouth every 6 (six) hours.     PREVNAR 13 (PF) 0.5 mL Syrg  Generic drug: pneumoc 13-channing conj-dip cr(PF)     thiamine 100 MG tablet  Take 1 tablet (100 mg total) by mouth once daily.     tobramycin sulfate 0.3% 0.3 % ophthalmic solution  Commonly known as: TOBREX  tobramycin 0.3 % eye drops     TYLENOL EXTRA STRENGTH 500 MG tablet  Generic drug: acetaminophen  Take 1,000 mg by mouth every 6 (six) hours as needed for Pain.     VITAMIN C 500 MG tablet  Generic drug: ascorbic acid (vitamin C)  Take 500 mg by mouth once daily.            Indwelling Lines/Drains at time of discharge:   Lines/Drains/Airways     Drain            Female External Urinary Catheter 11/04/20 1503 1 day                Time spent on the discharge of patient: 39 minutes  Patient was seen and examined on the date of discharge and determined to be suitable for discharge.         Sonu Colón MD  Department of Hospital Medicine  [facility]

## 2020-11-06 NOTE — HOSPITAL COURSE
Patient 73 yo female placed in observation due to Lower ext edema. Patient diuresed and improved. Patient no sob, and is discharged to facility for ongoing diuresis. Patient seen and examined prior to discharge.    TELE/STEPDOWN

## 2020-11-09 ENCOUNTER — TELEPHONE (OUTPATIENT)
Dept: CARDIOLOGY | Facility: CLINIC | Age: 75
End: 2020-11-09

## 2020-11-09 NOTE — TELEPHONE ENCOUNTER
CHF 48 hour call    Called and spoke with Viki, nurse at [facility] where pt resides. Viki informed pt denies CP, orthopnea, or PND. Pt still has BLE edema but has gotten better. Viki informed pt taking all medications.   Informed of hospital f/u appt with Jay Moss NP on Thursday, 11-12-20 at 9am. Viki informed would need to schedule f/u appt with transportation, however they are gone for the day and to call back tomorrow to schedule.

## 2020-11-09 NOTE — TELEPHONE ENCOUNTER
----- Message from Vee Coto RN sent at 11/9/2020  3:07 PM CST -----  Regarding: chf 48 hour call

## 2020-11-12 ENCOUNTER — OFFICE VISIT (OUTPATIENT)
Dept: CARDIOLOGY | Facility: CLINIC | Age: 75
End: 2020-11-12
Payer: MEDICARE

## 2020-11-12 ENCOUNTER — LAB VISIT (OUTPATIENT)
Dept: LAB | Facility: HOSPITAL | Age: 75
End: 2020-11-12
Attending: NURSE PRACTITIONER
Payer: MEDICARE

## 2020-11-12 VITALS
OXYGEN SATURATION: 99 % | BODY MASS INDEX: 34.82 KG/M2 | DIASTOLIC BLOOD PRESSURE: 70 MMHG | WEIGHT: 209 LBS | SYSTOLIC BLOOD PRESSURE: 122 MMHG | HEART RATE: 44 BPM | HEIGHT: 65 IN

## 2020-11-12 DIAGNOSIS — I50.33 ACUTE ON CHRONIC DIASTOLIC HEART FAILURE: ICD-10-CM

## 2020-11-12 DIAGNOSIS — E87.6 HYPOKALEMIA: ICD-10-CM

## 2020-11-12 DIAGNOSIS — R00.1 BRADYCARDIA: ICD-10-CM

## 2020-11-12 DIAGNOSIS — E83.42 HYPOMAGNESEMIA: ICD-10-CM

## 2020-11-12 DIAGNOSIS — I10 ESSENTIAL HYPERTENSION: Primary | ICD-10-CM

## 2020-11-12 LAB
ANION GAP SERPL CALC-SCNC: 5 MMOL/L (ref 8–16)
BNP SERPL-MCNC: 70 PG/ML (ref 0–99)
BUN SERPL-MCNC: 31 MG/DL (ref 8–23)
CALCIUM SERPL-MCNC: 10 MG/DL (ref 8.7–10.5)
CHLORIDE SERPL-SCNC: 105 MMOL/L (ref 95–110)
CO2 SERPL-SCNC: 28 MMOL/L (ref 23–29)
CREAT SERPL-MCNC: 0.9 MG/DL (ref 0.5–1.4)
EST. GFR  (AFRICAN AMERICAN): >60 ML/MIN/1.73 M^2
EST. GFR  (NON AFRICAN AMERICAN): >60 ML/MIN/1.73 M^2
GLUCOSE SERPL-MCNC: 126 MG/DL (ref 70–110)
MAGNESIUM SERPL-MCNC: 1.6 MG/DL (ref 1.6–2.6)
POTASSIUM SERPL-SCNC: 4.1 MMOL/L (ref 3.5–5.1)
SODIUM SERPL-SCNC: 138 MMOL/L (ref 136–145)

## 2020-11-12 PROCEDURE — 1159F MED LIST DOCD IN RCRD: CPT | Mod: S$GLB,,, | Performed by: NURSE PRACTITIONER

## 2020-11-12 PROCEDURE — 1126F PR PAIN SEVERITY QUANTIFIED, NO PAIN PRESENT: ICD-10-PCS | Mod: S$GLB,,, | Performed by: NURSE PRACTITIONER

## 2020-11-12 PROCEDURE — 99999 PR PBB SHADOW E&M-EST. PATIENT-LVL IV: ICD-10-PCS | Mod: PBBFAC,,, | Performed by: NURSE PRACTITIONER

## 2020-11-12 PROCEDURE — 99214 OFFICE O/P EST MOD 30 MIN: CPT | Mod: S$GLB,,, | Performed by: NURSE PRACTITIONER

## 2020-11-12 PROCEDURE — 99999 PR PBB SHADOW E&M-EST. PATIENT-LVL IV: CPT | Mod: PBBFAC,,, | Performed by: NURSE PRACTITIONER

## 2020-11-12 PROCEDURE — 3288F PR FALLS RISK ASSESSMENT DOCUMENTED: ICD-10-PCS | Mod: CPTII,S$GLB,, | Performed by: NURSE PRACTITIONER

## 2020-11-12 PROCEDURE — 1159F PR MEDICATION LIST DOCUMENTED IN MEDICAL RECORD: ICD-10-PCS | Mod: S$GLB,,, | Performed by: NURSE PRACTITIONER

## 2020-11-12 PROCEDURE — 80048 BASIC METABOLIC PNL TOTAL CA: CPT

## 2020-11-12 PROCEDURE — 83735 ASSAY OF MAGNESIUM: CPT

## 2020-11-12 PROCEDURE — 1101F PR PT FALLS ASSESS DOC 0-1 FALLS W/OUT INJ PAST YR: ICD-10-PCS | Mod: CPTII,S$GLB,, | Performed by: NURSE PRACTITIONER

## 2020-11-12 PROCEDURE — 36415 COLL VENOUS BLD VENIPUNCTURE: CPT

## 2020-11-12 PROCEDURE — 3288F FALL RISK ASSESSMENT DOCD: CPT | Mod: CPTII,S$GLB,, | Performed by: NURSE PRACTITIONER

## 2020-11-12 PROCEDURE — 1126F AMNT PAIN NOTED NONE PRSNT: CPT | Mod: S$GLB,,, | Performed by: NURSE PRACTITIONER

## 2020-11-12 PROCEDURE — 99214 PR OFFICE/OUTPT VISIT, EST, LEVL IV, 30-39 MIN: ICD-10-PCS | Mod: S$GLB,,, | Performed by: NURSE PRACTITIONER

## 2020-11-12 PROCEDURE — 1101F PT FALLS ASSESS-DOCD LE1/YR: CPT | Mod: CPTII,S$GLB,, | Performed by: NURSE PRACTITIONER

## 2020-11-12 PROCEDURE — 83880 ASSAY OF NATRIURETIC PEPTIDE: CPT

## 2020-11-12 RX ORDER — SERTRALINE HYDROCHLORIDE 25 MG/1
25 TABLET, FILM COATED ORAL DAILY
COMMUNITY
End: 2022-06-23 | Stop reason: SDUPTHER

## 2020-11-12 NOTE — LETTER
November 12, 2020      Sonu Colón MD  [id number] [place] [address] [address] [place] [id number]           '[place] - [facility]  [address]  [place] [address]  Phone: [phone]  Fax: [phone]          Patient: Dilia Talley   MR Number: [id number]   YOB: 1945   Date of Visit: 11/12/2020       Dear Dr. Sonu Colón:    Thank you for referring Dilia Talley to me for evaluation. Attached you will find relevant portions of my assessment and plan of care.    If you have questions, please do not hesitate to call me. I look forward to following Dilia Talley along with you.    Sincerely,    Alba Moss, [facility]    Enclosure  CC:  No Recipients    If you would like to receive this communication electronically, please contact [email or web address] or [phone] to request more information on [employer] access.    For providers and/or their staff who would like to refer a patient to [facility], please contact us through our one-stop-shop provider referral line, Chucky Arreola, at [phone].    If you feel you have received this communication in error or would no longer like to receive these types of communications, please e-mail [email or web address]

## 2020-11-12 NOTE — PROGRESS NOTES
Subjective:   Patient ID:  Dilia Talley is a 74 y.o. female who presents for follow up of Congestive Heart Failure      HPI    Ms. Talley is a 75 y/o WC bound patient who resides at [facility]. Her current conditions include DM, HTN, Gout, arthritis, HLP.   Treated for COVID PNA in Sept 2020. Had issues with bradycardia during admission. Had 2 reported syncopal episodes prior to admit. OP zio arranged. However, patient states that she threw her zio patch in the trash after removing it.   Echo in Sept 2020 showed normal LVF 65% with DD.   Stress test being arranged     She presents to clinic today for hospital follow up. Admitted to [facility] last week with decompensated HF. Presented with worsening LE edema.     Diuresed and DC'd home.   K+  2.8 and Mg 1.4 on 11/5/20. Hasn't been rechecked. Not taking K supplement at NH. DC'd with instructions to take lasix 40mg daily, but not listed on NH MAR   Zio needing to be rescheduled    Denies any chest pain, SOB, GONZALEZ,  orthopnea, PND, dizziness, palpitations,  near syncope, syncope or edema . Has no symptoms concerning for angina or equivalent. No CNS Complaints to suggest TIA or CVA. Does well with limiting sodium intake.      Past Medical History:   Diagnosis Date    Arthritis     Diabetes     Gout     HTN (hypertension)     Neuropathy     Obese        Past Surgical History:   Procedure Laterality Date    UMBILICAL HERNIA REPAIR         Social History     Tobacco Use    Smoking status: Never Smoker    Smokeless tobacco: Never Used   Substance Use Topics    Alcohol use: No    Drug use: No       Family History   Problem Relation Age of Onset    Diabetes Sister     Hypertension Sister        Current Outpatient Medications   Medication Sig    acetaminophen (TYLENOL EXTRA STRENGTH) 500 MG tablet Take 1,000 mg by mouth every 6 (six) hours as needed for Pain.    allopurinoL (ZYLOPRIM) 100 MG tablet allopurinol 100 mg tablet    ascorbic acid, vitamin C, (VITAMIN C)  500 MG tablet Take 500 mg by mouth once daily.    atorvastatin (LIPITOR) 40 MG tablet atorvastatin 40 mg tablet   Take 1 tablet by mouth daily.    ergocalciferol (ERGOCALCIFEROL) 50,000 unit Cap Take 1 capsule (50,000 Units total) by mouth every 7 days.    furosemide (LASIX) 40 MG tablet furosemide 40 mg tablet   Take 1 tablet by mouth daily.    gabapentin (NEURONTIN) 100 MG capsule Take 100 mg by mouth 3 (three) times daily.    insulin aspart U-100 (NOVOLOG) 100 unit/mL injection Inject into the skin as needed for High Blood Sugar.    sertraline (ZOLOFT) 25 MG tablet Take 25 mg by mouth once daily.    ACCU-CHEK SOFTCLIX LANCETS Misc     baclofen (LIORESAL) 10 MG tablet Take 1 tablet (10 mg total) by mouth 3 (three) times daily.    enoxaparin (LOVENOX) 40 mg/0.4 mL Syrg Inject 0.4 mLs (40 mg total) into the skin once daily.    losartan-hydrochlorothiazide 50-12.5 mg (HYZAAR) 50-12.5 mg per tablet Take 1 tablet by mouth once daily.    metformin (GLUCOPHAGE) 1000 MG tablet Take 1,000 mg by mouth 2 (two) times daily with meals.    ondansetron (ZOFRAN) 4 MG tablet Take 1 tablet (4 mg total) by mouth every 6 (six) hours.    PREVNAR 13, PF, 0.5 mL Syrg     pulse oximeter (PULSE OXIMETER) device by Apply Externally route 2 (two) times a day. Use twice daily at 8 AM and 3 PM and [facility] the [facility] in [facility] as directed.    thiamine 100 MG tablet Take 1 tablet (100 mg total) by mouth once daily.    tobramycin sulfate 0.3% (TOBREX) 0.3 % ophthalmic solution tobramycin 0.3 % eye drops     No current facility-administered medications for this visit.      Current Outpatient Medications on File Prior to Visit   Medication Sig    acetaminophen (TYLENOL EXTRA STRENGTH) 500 MG tablet Take 1,000 mg by mouth every 6 (six) hours as needed for Pain.    allopurinoL (ZYLOPRIM) 100 MG tablet allopurinol 100 mg tablet    ascorbic acid, vitamin C, (VITAMIN C) 500 MG tablet Take 500 mg by mouth once daily.    atorvastatin  (LIPITOR) 40 MG tablet atorvastatin 40 mg tablet   Take 1 tablet by mouth daily.    ergocalciferol (ERGOCALCIFEROL) 50,000 unit Cap Take 1 capsule (50,000 Units total) by mouth every 7 days.    furosemide (LASIX) 40 MG tablet furosemide 40 mg tablet   Take 1 tablet by mouth daily.    gabapentin (NEURONTIN) 100 MG capsule Take 100 mg by mouth 3 (three) times daily.    insulin aspart U-100 (NOVOLOG) 100 unit/mL injection Inject into the skin as needed for High Blood Sugar.    sertraline (ZOLOFT) 25 MG tablet Take 25 mg by mouth once daily.    ACCU-CHEK SOFTCLIX LANCETS Misc     baclofen (LIORESAL) 10 MG tablet Take 1 tablet (10 mg total) by mouth 3 (three) times daily.    enoxaparin (LOVENOX) 40 mg/0.4 mL Syrg Inject 0.4 mLs (40 mg total) into the skin once daily.    losartan-hydrochlorothiazide 50-12.5 mg (HYZAAR) 50-12.5 mg per tablet Take 1 tablet by mouth once daily.    metformin (GLUCOPHAGE) 1000 MG tablet Take 1,000 mg by mouth 2 (two) times daily with meals.    ondansetron (ZOFRAN) 4 MG tablet Take 1 tablet (4 mg total) by mouth every 6 (six) hours.    PREVNAR 13, PF, 0.5 mL Syrg     pulse oximeter (PULSE OXIMETER) device by Apply Externally route 2 (two) times a day. Use twice daily at 8 AM and 3 PM and [facility] the value in [facility] as directed.    thiamine 100 MG tablet Take 1 tablet (100 mg total) by mouth once daily.    tobramycin sulfate 0.3% (TOBREX) 0.3 % ophthalmic solution tobramycin 0.3 % eye drops     No current facility-administered medications on file prior to visit.        Review of Systems   Constitution: Positive for malaise/fatigue. Negative for chills, decreased appetite and fever.   HENT: Negative for congestion, hoarse voice and sore throat.    Eyes: Negative for blurred vision and discharge.   Cardiovascular: Positive for leg swelling. Negative for chest pain, claudication, cyanosis, dyspnea on exertion, irregular heartbeat, near-syncope, orthopnea, palpitations and paroxysmal  nocturnal dyspnea.   Respiratory: Negative for cough, hemoptysis, shortness of breath, snoring, sputum production and wheezing.    Endocrine: Negative for cold intolerance and heat intolerance.   Hematologic/Lymphatic: Negative for bleeding problem. Does not bruise/bleed easily.   Skin: Negative for rash.   Musculoskeletal: Positive for arthritis and joint pain. Negative for back pain, joint swelling, muscle cramps, muscle weakness and myalgias.   Gastrointestinal: Negative for abdominal pain, constipation, diarrhea, heartburn, melena and nausea.   Genitourinary: Negative for hematuria.   Neurological: Negative for dizziness, focal weakness, headaches, light-headedness, loss of balance, numbness, paresthesias, seizures and weakness.   Psychiatric/Behavioral: Negative for memory loss. The patient does not have insomnia.    Allergic/Immunologic: Negative for hives.       Objective:   Physical Exam   Constitutional: She is oriented to person, place, and time. She appears well-developed and well-nourished. No distress.   HENT:   Head: Normocephalic and atraumatic.   Eyes: Pupils are equal, round, and reactive to light. Right eye exhibits no discharge. Left eye exhibits no discharge.   Neck: Neck supple. No JVD present.   Cardiovascular: Normal rate, regular rhythm, S1 normal, S2 normal, normal heart sounds and intact distal pulses.  Occasional extrasystoles are present.   No murmur heard.  Pulmonary/Chest: Effort normal. No respiratory distress. She has no wheezes.   Abdominal: Soft. She exhibits no distension. There is no rebound.   Musculoskeletal:         General: Edema (+2-3 BLE extends just below knees) present.   Neurological: She is alert and oriented to person, place, and time.   Skin: Skin is warm and dry. She is not diaphoretic. No erythema.   Psychiatric: She has a normal mood and affect. Her behavior is normal. Thought content normal.   Nursing note and vitals reviewed.    Vitals:    11/12/20 1322   BP:  "122/70   BP Location: Left arm   Patient Position: Sitting   BP Method: Medium (Manual)   Pulse: (!) 44   SpO2: 99%   Height: 5' 5" (1.651 m)     No results found for: CHOL  No results found for: HDL  No results found for: LDLCALC  No results found for: TRIG  No results found for: CHOLHDL    Chemistry        Component Value Date/Time     11/05/2020 0704    K 2.8 (L) 11/05/2020 0704     11/05/2020 0704    CO2 27 11/05/2020 0704    BUN 11 11/05/2020 0704    CREATININE 0.8 11/05/2020 0704    GLU 90 11/05/2020 0704        Component Value Date/Time    CALCIUM 9.8 11/05/2020 0704    ALKPHOS 105 11/04/2020 1440    AST 11 11/04/2020 1440    ALT 8 (L) 11/04/2020 1440    BILITOT 0.3 11/04/2020 1440    ESTGFRAFRICA >60 11/05/2020 0704    EGFRNONAA >60 11/05/2020 0704          No results found for: TSH  Lab Results   Component Value Date    INR 1.0 11/04/2020    INR 0.9 07/26/2020    INR 0.9 04/28/2020     Lab Results   Component Value Date    WBC 7.16 11/05/2020    HGB 10.4 (L) 11/05/2020    HCT 33.9 (L) 11/05/2020    MCV 93 11/05/2020     (H) 11/05/2020     BMP  Sodium   Date Value Ref Range Status   11/05/2020 138 136 - 145 mmol/L Final     Potassium   Date Value Ref Range Status   11/05/2020 2.8 (L) 3.5 - 5.1 mmol/L Final     Chloride   Date Value Ref Range Status   11/05/2020 105 95 - 110 mmol/L Final     CO2   Date Value Ref Range Status   11/05/2020 27 23 - 29 mmol/L Final     BUN   Date Value Ref Range Status   11/05/2020 11 8 - 23 mg/dL Final     Creatinine   Date Value Ref Range Status   11/05/2020 0.8 0.5 - 1.4 mg/dL Final     Calcium   Date Value Ref Range Status   11/05/2020 9.8 8.7 - 10.5 mg/dL Final     Anion Gap   Date Value Ref Range Status   11/05/2020 6 (L) 8 - 16 mmol/L Final     eGFR if    Date Value Ref Range Status   11/05/2020 >60 >60 mL/min/1.73 m^2 Final     eGFR if non    Date Value Ref Range Status   11/05/2020 >60 >60 mL/min/1.73 m^2 Final     " Comment:     Calculation used to obtain the estimated glomerular filtration  rate (eGFR) is the CKD-EPI equation.        CrCl cannot be calculated (Patient's most recent lab result is older than the maximum 7 days allowed.).    Assessment:     1. Essential hypertension    2. Acute on chronic diastolic heart failure        Plan:     Will call [facility] to clarify meds again.  Will adjust diuretics pending labs.   Needs stat labs to recheck K and Mg TODAY  Will need to start KCL supplement if she is still needing Lasix . Will call NH with recommendations.   Compression socks needed   Zio needs to be rescheduled   MPI when more compensated.   RTC with Bibiana in 2 weeks

## 2020-11-18 ENCOUNTER — HOSPITAL ENCOUNTER (OUTPATIENT)
Dept: CARDIOLOGY | Facility: HOSPITAL | Age: 75
Discharge: HOME | End: 2020-11-18
Attending: NURSE PRACTITIONER
Payer: MEDICARE

## 2020-11-18 DIAGNOSIS — R00.1 BRADYCARDIA: ICD-10-CM

## 2020-11-18 PROCEDURE — 0298T CV CARDIAC MONITOR - 3-14 DAY ADULT (CUPID ONLY): CPT | Mod: ,,, | Performed by: INTERNAL MEDICINE

## 2020-11-18 PROCEDURE — 0296T CV CARDIAC MONITOR - 3-14 DAY ADULT (CUPID ONLY): CPT | Mod: ,,, | Performed by: INTERNAL MEDICINE

## 2020-11-18 PROCEDURE — 0296T CV CARDIAC MONITOR - 3-14 DAY ADULT (CUPID ONLY): ICD-10-PCS | Mod: ,,, | Performed by: INTERNAL MEDICINE

## 2020-11-18 PROCEDURE — 0298T CV CARDIAC MONITOR - 3-14 DAY ADULT (CUPID ONLY): ICD-10-PCS | Mod: ,,, | Performed by: INTERNAL MEDICINE

## 2020-11-24 ENCOUNTER — OFFICE VISIT (OUTPATIENT)
Dept: CARDIOLOGY | Facility: CLINIC | Age: 75
End: 2020-11-24
Payer: MEDICARE

## 2020-11-24 ENCOUNTER — LAB VISIT (OUTPATIENT)
Dept: LAB | Facility: HOSPITAL | Age: 75
End: 2020-11-24
Attending: PHYSICIAN ASSISTANT
Payer: MEDICARE

## 2020-11-24 VITALS
OXYGEN SATURATION: 99 % | BODY MASS INDEX: 34.78 KG/M2 | SYSTOLIC BLOOD PRESSURE: 108 MMHG | HEIGHT: 65 IN | DIASTOLIC BLOOD PRESSURE: 60 MMHG | HEART RATE: 74 BPM

## 2020-11-24 DIAGNOSIS — I10 ESSENTIAL HYPERTENSION: ICD-10-CM

## 2020-11-24 DIAGNOSIS — R60.0 LEG EDEMA: Primary | ICD-10-CM

## 2020-11-24 DIAGNOSIS — R94.31 NONSPECIFIC ABNORMAL ELECTROCARDIOGRAM (ECG) (EKG): ICD-10-CM

## 2020-11-24 DIAGNOSIS — R60.0 BILATERAL EDEMA OF LOWER EXTREMITY: ICD-10-CM

## 2020-11-24 DIAGNOSIS — E66.01 MORBID OBESITY: ICD-10-CM

## 2020-11-24 DIAGNOSIS — R00.1 BRADYCARDIA: ICD-10-CM

## 2020-11-24 DIAGNOSIS — E11.49 DIABETES MELLITUS TYPE 2 WITH NEUROLOGICAL MANIFESTATIONS: ICD-10-CM

## 2020-11-24 DIAGNOSIS — R60.0 LEG EDEMA: ICD-10-CM

## 2020-11-24 DIAGNOSIS — Z87.898 HISTORY OF SYNCOPE: ICD-10-CM

## 2020-11-24 LAB
ALBUMIN SERPL BCP-MCNC: 2.2 G/DL (ref 3.5–5.2)
ALP SERPL-CCNC: 124 U/L (ref 55–135)
ALT SERPL W/O P-5'-P-CCNC: 9 U/L (ref 10–44)
ANION GAP SERPL CALC-SCNC: 8 MMOL/L (ref 8–16)
AST SERPL-CCNC: 14 U/L (ref 10–40)
BILIRUB SERPL-MCNC: 0.3 MG/DL (ref 0.1–1)
BUN SERPL-MCNC: 32 MG/DL (ref 8–23)
CALCIUM SERPL-MCNC: 9.7 MG/DL (ref 8.7–10.5)
CHLORIDE SERPL-SCNC: 102 MMOL/L (ref 95–110)
CO2 SERPL-SCNC: 29 MMOL/L (ref 23–29)
CREAT SERPL-MCNC: 0.9 MG/DL (ref 0.5–1.4)
EST. GFR  (AFRICAN AMERICAN): >60 ML/MIN/1.73 M^2
EST. GFR  (NON AFRICAN AMERICAN): >60 ML/MIN/1.73 M^2
GLUCOSE SERPL-MCNC: 102 MG/DL (ref 70–110)
POTASSIUM SERPL-SCNC: 3.1 MMOL/L (ref 3.5–5.1)
PROT SERPL-MCNC: 7.3 G/DL (ref 6–8.4)
SODIUM SERPL-SCNC: 139 MMOL/L (ref 136–145)

## 2020-11-24 PROCEDURE — 1159F PR MEDICATION LIST DOCUMENTED IN MEDICAL RECORD: ICD-10-PCS | Mod: S$GLB,,, | Performed by: PHYSICIAN ASSISTANT

## 2020-11-24 PROCEDURE — 99214 PR OFFICE/OUTPT VISIT, EST, LEVL IV, 30-39 MIN: ICD-10-PCS | Mod: S$GLB,,, | Performed by: PHYSICIAN ASSISTANT

## 2020-11-24 PROCEDURE — 36415 COLL VENOUS BLD VENIPUNCTURE: CPT

## 2020-11-24 PROCEDURE — 99999 PR PBB SHADOW E&M-EST. PATIENT-LVL III: CPT | Mod: PBBFAC,,, | Performed by: PHYSICIAN ASSISTANT

## 2020-11-24 PROCEDURE — 1125F PR PAIN SEVERITY QUANTIFIED, PAIN PRESENT: ICD-10-PCS | Mod: S$GLB,,, | Performed by: PHYSICIAN ASSISTANT

## 2020-11-24 PROCEDURE — 1159F MED LIST DOCD IN RCRD: CPT | Mod: S$GLB,,, | Performed by: PHYSICIAN ASSISTANT

## 2020-11-24 PROCEDURE — 99214 OFFICE O/P EST MOD 30 MIN: CPT | Mod: S$GLB,,, | Performed by: PHYSICIAN ASSISTANT

## 2020-11-24 PROCEDURE — 1125F AMNT PAIN NOTED PAIN PRSNT: CPT | Mod: S$GLB,,, | Performed by: PHYSICIAN ASSISTANT

## 2020-11-24 PROCEDURE — 80053 COMPREHEN METABOLIC PANEL: CPT

## 2020-11-24 PROCEDURE — 99999 PR PBB SHADOW E&M-EST. PATIENT-LVL III: ICD-10-PCS | Mod: PBBFAC,,, | Performed by: PHYSICIAN ASSISTANT

## 2020-11-24 NOTE — PROGRESS NOTES
Subjective:    Patient ID:  Dilia Talley is a 74 y.o. female who presents for follow-up of CHF      HPI  Ms. Talley is a 74 year old female patient whose current medical conditions include HTN, DM type II, and hyperlipidemia who presents today for follow-up. Patient previously seen by me and was admitted to [facility] for fluid overload. She was diuresed and discharged home. She returns today and states she is doing well overall. BLE edema improved. Denies any SOB, PND, orthopnea. No chest pain, heaviness, or tightness. No palpitations, near syncope, or syncope. No s/s of TIA/CVA. BP controlled today. Patient is compliant [facility] medications, still residing at the [facility]. o monitor in place.    Recent labs from 11/12 reviewed and are stable WNL. Patient is taking Lasix. Needs MPI stress test. BLE venous U/S from hospital reviewed-negative for DVT.    Review of Systems   Constitution: Negative for chills, decreased appetite, fever and malaise/fatigue.   HENT: Negative for congestion, hoarse voice and sore throat.    Eyes: Negative for blurred vision and discharge.   Cardiovascular: Positive for leg swelling. Negative for chest pain, claudication, cyanosis, dyspnea on exertion, irregular heartbeat, near-syncope, orthopnea, palpitations and paroxysmal nocturnal dyspnea.   Respiratory: Negative for cough, hemoptysis, shortness of breath, snoring, sputum production and wheezing.    Endocrine: Negative for cold intolerance and heat intolerance.   Hematologic/Lymphatic: Negative for bleeding problem. Does not bruise/bleed easily.   Skin: Negative for rash.   Musculoskeletal: Positive for arthritis and joint pain. Negative for back pain, joint swelling, muscle cramps, muscle weakness and myalgias.   Gastrointestinal: Negative for abdominal pain, constipation, diarrhea, heartburn, melena and nausea.   Genitourinary: Negative for hematuria.   Neurological: Negative for dizziness, focal weakness, headaches,  "light-headedness, loss of balance, numbness, paresthesias, seizures and weakness.   Psychiatric/Behavioral: Negative for memory loss. The patient does not have insomnia.    Allergic/Immunologic: Negative for hives.     /60 (BP Location: Left arm, Patient Position: Sitting, BP Method: Medium (Manual))   Pulse 74   Ht 5' 5" (1.651 m)   SpO2 99%   BMI 34.78 kg/m²     Objective:          Physical Exam   Constitutional: She is oriented to person, place, and time. She appears well-developed and well-nourished. No distress. In wheelchair  HENT:   Head: Normocephalic and atraumatic.   Eyes: Pupils are equal, round, and reactive to light. Right eye exhibits no discharge. Left eye exhibits no discharge.   Neck: Neck supple. No JVD present.   Cardiovascular: Normal rate, regular rhythm, S1 normal, S2 normal, normal heart sounds and intact distal pulses.  Occasional extrasystoles are present.   No murmur heard.  Pulmonary/Chest: Effort normal. No respiratory distress. She has no wheezes. No rales.  Abdominal: Soft. She exhibits no distension. There is no rebound.   Musculoskeletal:         General: Edema (2-3+ up to knees) present.   Neurological: She is alert and oriented to person, place, and time.   Skin: Skin is warm and dry. She is not diaphoretic. No erythema.   Psychiatric: She has a normal mood and affect. Her behavior is normal. Thought content normal.   Nursing note and vitals reviewed.    Echo Results  · The left ventricle is normal in size with normal systolic function. The estimated ejection fraction is 65%.  · Grade I diastolic dysfunction.  · Normal right ventricular systolic function.  · The estimated PA systolic pressure is 38 mmHg  Assessment:       1. Leg edema    2. Essential hypertension    3. Bradycardia    4. Diabetes mellitus type 2 with neurological manifestations    5. Morbid obesity    6. Bilateral edema of lower extremity    7. History of syncope    8. Nonspecific abnormal electrocardiogram " (ECG) (EKG)      Doing clinically well. Still has some BLE edema. Check CMP today as hypoalbuminemia may be contributing factor. Pending results, may increase Lasix to 40 mg BID x 2-3 days. Continue other meds for now. Zio monitor in place. Needs MPI stress test.  Plan:   -CMP today with phone review  -MPI stress test  -Recent labs reviewed  -Awaiting Zio results  -Continue same meds for now, may double Lasix dosage pending creatinine/electrolytes  -Follow-up TBA post-tests

## 2020-11-25 ENCOUNTER — TELEPHONE (OUTPATIENT)
Dept: CARDIOLOGY | Facility: CLINIC | Age: 75
End: 2020-11-25

## 2020-11-25 NOTE — TELEPHONE ENCOUNTER
Spoke with nurse, Shalonda, to advise her that BMP reviewed, creatinine stable. K low at 3.1.  Reports patient not on KCL at present.  Start HCL 20 meq daily.  Repeat BMP in 1 week.  Shalonda repeated order given.

## 2020-12-02 ENCOUNTER — TELEPHONE (OUTPATIENT)
Dept: CARDIOLOGY | Facility: CLINIC | Age: 75
End: 2020-12-02

## 2020-12-09 ENCOUNTER — TELEPHONE (OUTPATIENT)
Dept: CARDIOLOGY | Facility: HOSPITAL | Age: 75
End: 2020-12-09

## 2020-12-09 NOTE — TELEPHONE ENCOUNTER
Patient is a resident at [facility]. I called and spoke with Mayuri MARIN and gave the following information and instructions.    12/10/2020 - Nuclear Stress Test at 08:15 at [facility] [place]                        3-4 hour test  Instucted to stop all consumption of caffeine ( no coffee, tea, chocolate, caffeine free drinks)  starting now and  no caffeine in the morning.    Mayuri AVILES stated patient is not diabetic. Mayuri was instructed if patient must eat can have something light 2-4 hours to the test.    Mayuri AVILES repeated instructions back correctly.

## 2020-12-10 ENCOUNTER — HOSPITAL ENCOUNTER (OUTPATIENT)
Dept: CARDIOLOGY | Facility: HOSPITAL | Age: 75
Discharge: HOME | End: 2020-12-10
Attending: PHYSICIAN ASSISTANT
Payer: MEDICARE

## 2020-12-10 ENCOUNTER — HOSPITAL ENCOUNTER (OUTPATIENT)
Dept: RADIOLOGY | Facility: HOSPITAL | Age: 75
Discharge: HOME | End: 2020-12-10
Attending: PHYSICIAN ASSISTANT
Payer: MEDICARE

## 2020-12-10 DIAGNOSIS — E66.01 MORBID OBESITY: ICD-10-CM

## 2020-12-10 DIAGNOSIS — R60.0 BILATERAL EDEMA OF LOWER EXTREMITY: ICD-10-CM

## 2020-12-10 DIAGNOSIS — I10 ESSENTIAL HYPERTENSION: ICD-10-CM

## 2020-12-10 DIAGNOSIS — R94.31 NONSPECIFIC ABNORMAL ELECTROCARDIOGRAM (ECG) (EKG): ICD-10-CM

## 2020-12-10 DIAGNOSIS — E11.49 DIABETES MELLITUS TYPE 2 WITH NEUROLOGICAL MANIFESTATIONS: ICD-10-CM

## 2020-12-10 DIAGNOSIS — R60.0 LEG EDEMA: ICD-10-CM

## 2020-12-10 DIAGNOSIS — R00.1 BRADYCARDIA: ICD-10-CM

## 2020-12-10 DIAGNOSIS — Z87.898 HISTORY OF SYNCOPE: ICD-10-CM

## 2020-12-10 PROCEDURE — A9502 TC99M TETROFOSMIN: HCPCS

## 2020-12-10 PROCEDURE — 93016 STRESS TEST WITH MYOCARDIAL PERFUSION (CUPID ONLY): ICD-10-PCS | Mod: ,,, | Performed by: INTERNAL MEDICINE

## 2020-12-10 PROCEDURE — 93016 CV STRESS TEST SUPVJ ONLY: CPT | Mod: ,,, | Performed by: INTERNAL MEDICINE

## 2020-12-10 PROCEDURE — 78452 HT MUSCLE IMAGE SPECT MULT: CPT | Mod: 26,,, | Performed by: INTERNAL MEDICINE

## 2020-12-10 PROCEDURE — 78452 STRESS TEST WITH MYOCARDIAL PERFUSION (CUPID ONLY): ICD-10-PCS | Mod: 26,,, | Performed by: INTERNAL MEDICINE

## 2020-12-10 PROCEDURE — 93018 STRESS TEST WITH MYOCARDIAL PERFUSION (CUPID ONLY): ICD-10-PCS | Mod: ,,, | Performed by: INTERNAL MEDICINE

## 2020-12-10 PROCEDURE — 63600175 PHARM REV CODE 636 W HCPCS: Performed by: PHYSICIAN ASSISTANT

## 2020-12-10 PROCEDURE — 93018 CV STRESS TEST I&R ONLY: CPT | Mod: ,,, | Performed by: INTERNAL MEDICINE

## 2020-12-10 PROCEDURE — 93017 CV STRESS TEST TRACING ONLY: CPT

## 2020-12-10 RX ORDER — REGADENOSON 0.08 MG/ML
0.4 INJECTION, SOLUTION INTRAVENOUS ONCE
Status: COMPLETED | OUTPATIENT
Start: 2020-12-10 | End: 2020-12-10

## 2020-12-10 RX ADMIN — REGADENOSON 0.4 MG: 0.08 INJECTION, SOLUTION INTRAVENOUS at 10:12

## 2020-12-12 LAB
CV STRESS BASE HR: 70 BPM
DIASTOLIC BLOOD PRESSURE: 71 MMHG
NUC REST EJECTION FRACTION: 77
OHS CV CPX 85 PERCENT MAX PREDICTED HEART RATE MALE: 118
OHS CV CPX ESTIMATED METS: 1
OHS CV CPX MAX PREDICTED HEART RATE: 138
OHS CV CPX PATIENT IS FEMALE: 1
OHS CV CPX PATIENT IS MALE: 0
OHS CV CPX PEAK DIASTOLIC BLOOD PRESSURE: 70 MMHG
OHS CV CPX PEAK HEAR RATE: 97 BPM
OHS CV CPX PEAK RATE PRESSURE PRODUCT: NORMAL
OHS CV CPX PEAK SYSTOLIC BLOOD PRESSURE: 125 MMHG
OHS CV CPX PERCENT MAX PREDICTED HEART RATE ACHIEVED: 70
OHS CV CPX RATE PRESSURE PRODUCT PRESENTING: 8960
STRESS ECHO POST EXERCISE DUR MIN: 1 MINUTES
STRESS ECHO POST EXERCISE DUR SEC: 3 SECONDS
SYSTOLIC BLOOD PRESSURE: 128 MMHG

## 2020-12-16 ENCOUNTER — TELEPHONE (OUTPATIENT)
Dept: CARDIOLOGY | Facility: CLINIC | Age: 75
End: 2020-12-16

## 2020-12-16 NOTE — TELEPHONE ENCOUNTER
Please phone patient. Stress test reviewed and is negative.    Make sure she has scheduled f/u in 3 mos    Thanks

## 2021-03-26 ENCOUNTER — OFFICE VISIT (OUTPATIENT)
Dept: CARDIOLOGY | Facility: CLINIC | Age: 76
End: 2021-03-26
Payer: MEDICARE

## 2021-03-26 VITALS
HEIGHT: 65 IN | SYSTOLIC BLOOD PRESSURE: 132 MMHG | DIASTOLIC BLOOD PRESSURE: 78 MMHG | BODY MASS INDEX: 34.82 KG/M2 | WEIGHT: 209 LBS | OXYGEN SATURATION: 98 % | HEART RATE: 59 BPM

## 2021-03-26 DIAGNOSIS — R60.0 BILATERAL EDEMA OF LOWER EXTREMITY: ICD-10-CM

## 2021-03-26 DIAGNOSIS — E11.49 DIABETES MELLITUS TYPE 2 WITH NEUROLOGICAL MANIFESTATIONS: ICD-10-CM

## 2021-03-26 DIAGNOSIS — E66.01 MORBID OBESITY: ICD-10-CM

## 2021-03-26 DIAGNOSIS — R00.1 BRADYCARDIA: Primary | ICD-10-CM

## 2021-03-26 DIAGNOSIS — Z87.898 HISTORY OF SYNCOPE: ICD-10-CM

## 2021-03-26 DIAGNOSIS — R55 SYNCOPE AND COLLAPSE: ICD-10-CM

## 2021-03-26 PROCEDURE — 1159F PR MEDICATION LIST DOCUMENTED IN MEDICAL RECORD: ICD-10-PCS | Mod: S$GLB,,, | Performed by: PHYSICIAN ASSISTANT

## 2021-03-26 PROCEDURE — 99214 PR OFFICE/OUTPT VISIT, EST, LEVL IV, 30-39 MIN: ICD-10-PCS | Mod: S$GLB,,, | Performed by: PHYSICIAN ASSISTANT

## 2021-03-26 PROCEDURE — 3078F DIAST BP <80 MM HG: CPT | Mod: CPTII,S$GLB,, | Performed by: PHYSICIAN ASSISTANT

## 2021-03-26 PROCEDURE — 99999 PR PBB SHADOW E&M-EST. PATIENT-LVL III: CPT | Mod: PBBFAC,,, | Performed by: PHYSICIAN ASSISTANT

## 2021-03-26 PROCEDURE — 1159F MED LIST DOCD IN RCRD: CPT | Mod: S$GLB,,, | Performed by: PHYSICIAN ASSISTANT

## 2021-03-26 PROCEDURE — 3075F SYST BP GE 130 - 139MM HG: CPT | Mod: CPTII,S$GLB,, | Performed by: PHYSICIAN ASSISTANT

## 2021-03-26 PROCEDURE — 3075F PR MOST RECENT SYSTOLIC BLOOD PRESS GE 130-139MM HG: ICD-10-PCS | Mod: CPTII,S$GLB,, | Performed by: PHYSICIAN ASSISTANT

## 2021-03-26 PROCEDURE — 99999 PR PBB SHADOW E&M-EST. PATIENT-LVL III: ICD-10-PCS | Mod: PBBFAC,,, | Performed by: PHYSICIAN ASSISTANT

## 2021-03-26 PROCEDURE — 3078F PR MOST RECENT DIASTOLIC BLOOD PRESSURE < 80 MM HG: ICD-10-PCS | Mod: CPTII,S$GLB,, | Performed by: PHYSICIAN ASSISTANT

## 2021-03-26 PROCEDURE — 99214 OFFICE O/P EST MOD 30 MIN: CPT | Mod: S$GLB,,, | Performed by: PHYSICIAN ASSISTANT

## 2021-03-26 RX ORDER — POTASSIUM CHLORIDE 1500 MG/1
20 TABLET, EXTENDED RELEASE ORAL DAILY
COMMUNITY
Start: 2021-03-19

## 2021-04-07 ENCOUNTER — HOSPITAL ENCOUNTER (OUTPATIENT)
Dept: CARDIOLOGY | Facility: HOSPITAL | Age: 76
Discharge: HOME | End: 2021-04-07
Attending: PHYSICIAN ASSISTANT
Payer: MEDICARE

## 2021-04-07 VITALS — HEIGHT: 65 IN | BODY MASS INDEX: 34.66 KG/M2 | WEIGHT: 208 LBS

## 2021-04-07 DIAGNOSIS — R60.0 BILATERAL EDEMA OF LOWER EXTREMITY: ICD-10-CM

## 2021-04-07 DIAGNOSIS — R55 SYNCOPE AND COLLAPSE: ICD-10-CM

## 2021-04-07 DIAGNOSIS — E11.49 DIABETES MELLITUS TYPE 2 WITH NEUROLOGICAL MANIFESTATIONS: ICD-10-CM

## 2021-04-07 DIAGNOSIS — Z87.898 HISTORY OF SYNCOPE: ICD-10-CM

## 2021-04-07 DIAGNOSIS — E66.01 MORBID OBESITY: ICD-10-CM

## 2021-04-07 DIAGNOSIS — R00.1 BRADYCARDIA: ICD-10-CM

## 2021-04-07 LAB
LEFT ARM DIASTOLIC BLOOD PRESSURE: 73 MMHG
LEFT ARM SYSTOLIC BLOOD PRESSURE: 130 MMHG
LEFT CBA DIAS: 13 CM/S
LEFT CBA SYS: 54 CM/S
LEFT CCA DIST DIAS: 14 CM/S
LEFT CCA DIST SYS: 66 CM/S
LEFT CCA MID DIAS: 21 CM/S
LEFT CCA MID SYS: 103 CM/S
LEFT CCA PROX DIAS: 12 CM/S
LEFT CCA PROX SYS: 109 CM/S
LEFT ECA DIAS: 6 CM/S
LEFT ECA SYS: 38 CM/S
LEFT ICA DIST DIAS: 17 CM/S
LEFT ICA DIST SYS: 63 CM/S
LEFT ICA MID DIAS: 25 CM/S
LEFT ICA MID SYS: 106 CM/S
LEFT ICA PROX DIAS: 17 CM/S
LEFT ICA PROX SYS: 75 CM/S
LEFT VERTEBRAL DIAS: 18 CM/S
LEFT VERTEBRAL SYS: 69 CM/S
OHS CV CAROTID RIGHT ICA EDV HIGHEST: 39
OHS CV CAROTID ULTRASOUND LEFT ICA/CCA RATIO: 1.61
OHS CV CAROTID ULTRASOUND RIGHT ICA/CCA RATIO: 1.64
OHS CV PV CAROTID LEFT HIGHEST CCA: 109
OHS CV PV CAROTID LEFT HIGHEST ICA: 106
OHS CV PV CAROTID RIGHT HIGHEST CCA: 223
OHS CV PV CAROTID RIGHT HIGHEST ICA: 131
OHS CV US CAROTID LEFT HIGHEST EDV: 25
RIGHT ARM DIASTOLIC BLOOD PRESSURE: 76 MMHG
RIGHT ARM SYSTOLIC BLOOD PRESSURE: 135 MMHG
RIGHT CBA DIAS: 23 CM/S
RIGHT CBA SYS: 69 CM/S
RIGHT CCA DIST DIAS: 13 CM/S
RIGHT CCA DIST SYS: 80 CM/S
RIGHT CCA MID DIAS: 25 CM/S
RIGHT CCA MID SYS: 150 CM/S
RIGHT CCA PROX DIAS: 22 CM/S
RIGHT CCA PROX SYS: 223 CM/S
RIGHT ECA DIAS: 9 CM/S
RIGHT ECA SYS: 62 CM/S
RIGHT ICA DIST DIAS: 18 CM/S
RIGHT ICA DIST SYS: 88 CM/S
RIGHT ICA MID DIAS: 39 CM/S
RIGHT ICA MID SYS: 131 CM/S
RIGHT ICA PROX DIAS: 32 CM/S
RIGHT ICA PROX SYS: 116 CM/S
RIGHT VERTEBRAL DIAS: 21 CM/S
RIGHT VERTEBRAL SYS: 80 CM/S

## 2021-04-07 PROCEDURE — 93880 CV US DOPPLER CAROTID (CUPID ONLY): ICD-10-PCS | Mod: 26,,, | Performed by: INTERNAL MEDICINE

## 2021-04-07 PROCEDURE — 93880 EXTRACRANIAL BILAT STUDY: CPT | Mod: 26,,, | Performed by: INTERNAL MEDICINE

## 2021-04-07 PROCEDURE — 93880 EXTRACRANIAL BILAT STUDY: CPT

## 2021-04-09 ENCOUNTER — TELEPHONE (OUTPATIENT)
Dept: CARDIOLOGY | Facility: HOSPITAL | Age: 76
End: 2021-04-09

## 2021-09-29 ENCOUNTER — OFFICE VISIT (OUTPATIENT)
Dept: CARDIOLOGY | Facility: CLINIC | Age: 76
End: 2021-09-29
Payer: MEDICARE

## 2021-09-29 VITALS
SYSTOLIC BLOOD PRESSURE: 118 MMHG | BODY MASS INDEX: 32.28 KG/M2 | OXYGEN SATURATION: 97 % | DIASTOLIC BLOOD PRESSURE: 62 MMHG | WEIGHT: 194 LBS | HEART RATE: 56 BPM

## 2021-09-29 DIAGNOSIS — E66.01 MORBID OBESITY: ICD-10-CM

## 2021-09-29 DIAGNOSIS — E11.49 DIABETES MELLITUS TYPE 2 WITH NEUROLOGICAL MANIFESTATIONS: ICD-10-CM

## 2021-09-29 DIAGNOSIS — I10 ESSENTIAL HYPERTENSION: ICD-10-CM

## 2021-09-29 DIAGNOSIS — R00.1 BRADYCARDIA: ICD-10-CM

## 2021-09-29 DIAGNOSIS — R94.31 NONSPECIFIC ABNORMAL ELECTROCARDIOGRAM (ECG) (EKG): Primary | ICD-10-CM

## 2021-09-29 DIAGNOSIS — R60.0 BILATERAL EDEMA OF LOWER EXTREMITY: ICD-10-CM

## 2021-09-29 DIAGNOSIS — Z87.898 HISTORY OF SYNCOPE: ICD-10-CM

## 2021-09-29 PROBLEM — Z86.16 HISTORY OF COVID-19: Status: ACTIVE | Noted: 2020-07-28

## 2021-09-29 PROCEDURE — 1160F PR REVIEW ALL MEDS BY PRESCRIBER/CLIN PHARMACIST DOCUMENTED: ICD-10-PCS | Mod: HCNC,CPTII,S$GLB, | Performed by: PHYSICIAN ASSISTANT

## 2021-09-29 PROCEDURE — 99499 RISK ADDL DX/OHS AUDIT: ICD-10-PCS | Mod: S$GLB,,, | Performed by: PHYSICIAN ASSISTANT

## 2021-09-29 PROCEDURE — 3074F PR MOST RECENT SYSTOLIC BLOOD PRESSURE < 130 MM HG: ICD-10-PCS | Mod: HCNC,CPTII,S$GLB, | Performed by: PHYSICIAN ASSISTANT

## 2021-09-29 PROCEDURE — 1126F AMNT PAIN NOTED NONE PRSNT: CPT | Mod: HCNC,CPTII,S$GLB, | Performed by: PHYSICIAN ASSISTANT

## 2021-09-29 PROCEDURE — 99214 PR OFFICE/OUTPT VISIT, EST, LEVL IV, 30-39 MIN: ICD-10-PCS | Mod: HCNC,S$GLB,, | Performed by: PHYSICIAN ASSISTANT

## 2021-09-29 PROCEDURE — 3074F SYST BP LT 130 MM HG: CPT | Mod: HCNC,CPTII,S$GLB, | Performed by: PHYSICIAN ASSISTANT

## 2021-09-29 PROCEDURE — 1160F RVW MEDS BY RX/DR IN RCRD: CPT | Mod: HCNC,CPTII,S$GLB, | Performed by: PHYSICIAN ASSISTANT

## 2021-09-29 PROCEDURE — 99214 OFFICE O/P EST MOD 30 MIN: CPT | Mod: HCNC,S$GLB,, | Performed by: PHYSICIAN ASSISTANT

## 2021-09-29 PROCEDURE — 99499 UNLISTED E&M SERVICE: CPT | Mod: S$GLB,,, | Performed by: PHYSICIAN ASSISTANT

## 2021-09-29 PROCEDURE — 99999 PR PBB SHADOW E&M-EST. PATIENT-LVL III: ICD-10-PCS | Mod: PBBFAC,HCNC,, | Performed by: PHYSICIAN ASSISTANT

## 2021-09-29 PROCEDURE — 1159F PR MEDICATION LIST DOCUMENTED IN MEDICAL RECORD: ICD-10-PCS | Mod: HCNC,CPTII,S$GLB, | Performed by: PHYSICIAN ASSISTANT

## 2021-09-29 PROCEDURE — 99999 PR PBB SHADOW E&M-EST. PATIENT-LVL III: CPT | Mod: PBBFAC,HCNC,, | Performed by: PHYSICIAN ASSISTANT

## 2021-09-29 PROCEDURE — 3078F PR MOST RECENT DIASTOLIC BLOOD PRESSURE < 80 MM HG: ICD-10-PCS | Mod: HCNC,CPTII,S$GLB, | Performed by: PHYSICIAN ASSISTANT

## 2021-09-29 PROCEDURE — 1159F MED LIST DOCD IN RCRD: CPT | Mod: HCNC,CPTII,S$GLB, | Performed by: PHYSICIAN ASSISTANT

## 2021-09-29 PROCEDURE — 1126F PR PAIN SEVERITY QUANTIFIED, NO PAIN PRESENT: ICD-10-PCS | Mod: HCNC,CPTII,S$GLB, | Performed by: PHYSICIAN ASSISTANT

## 2021-09-29 PROCEDURE — 3078F DIAST BP <80 MM HG: CPT | Mod: HCNC,CPTII,S$GLB, | Performed by: PHYSICIAN ASSISTANT

## 2021-09-29 RX ORDER — NAPROXEN SODIUM 220 MG/1
81 TABLET, FILM COATED ORAL DAILY
COMMUNITY

## 2022-06-23 ENCOUNTER — OFFICE VISIT (OUTPATIENT)
Dept: GASTROENTEROLOGY | Facility: CLINIC | Age: 77
End: 2022-06-23
Payer: MEDICARE

## 2022-06-23 VITALS
DIASTOLIC BLOOD PRESSURE: 70 MMHG | WEIGHT: 194 LBS | BODY MASS INDEX: 32.32 KG/M2 | SYSTOLIC BLOOD PRESSURE: 140 MMHG | HEIGHT: 65 IN | HEART RATE: 58 BPM

## 2022-06-23 DIAGNOSIS — K52.9 CHRONIC DIARRHEA: Primary | ICD-10-CM

## 2022-06-23 DIAGNOSIS — R10.84 GENERALIZED ABDOMINAL PAIN: ICD-10-CM

## 2022-06-23 PROCEDURE — 1101F PR PT FALLS ASSESS DOC 0-1 FALLS W/OUT INJ PAST YR: ICD-10-PCS | Mod: CPTII,S$GLB,, | Performed by: NURSE PRACTITIONER

## 2022-06-23 PROCEDURE — 99999 PR PBB SHADOW E&M-EST. PATIENT-LVL IV: ICD-10-PCS | Mod: PBBFAC,,, | Performed by: NURSE PRACTITIONER

## 2022-06-23 PROCEDURE — 99204 PR OFFICE/OUTPT VISIT, NEW, LEVL IV, 45-59 MIN: ICD-10-PCS | Mod: S$GLB,,, | Performed by: NURSE PRACTITIONER

## 2022-06-23 PROCEDURE — 3288F FALL RISK ASSESSMENT DOCD: CPT | Mod: CPTII,S$GLB,, | Performed by: NURSE PRACTITIONER

## 2022-06-23 PROCEDURE — 3078F DIAST BP <80 MM HG: CPT | Mod: CPTII,S$GLB,, | Performed by: NURSE PRACTITIONER

## 2022-06-23 PROCEDURE — 1125F AMNT PAIN NOTED PAIN PRSNT: CPT | Mod: CPTII,S$GLB,, | Performed by: NURSE PRACTITIONER

## 2022-06-23 PROCEDURE — 3078F PR MOST RECENT DIASTOLIC BLOOD PRESSURE < 80 MM HG: ICD-10-PCS | Mod: CPTII,S$GLB,, | Performed by: NURSE PRACTITIONER

## 2022-06-23 PROCEDURE — 1160F PR REVIEW ALL MEDS BY PRESCRIBER/CLIN PHARMACIST DOCUMENTED: ICD-10-PCS | Mod: CPTII,S$GLB,, | Performed by: NURSE PRACTITIONER

## 2022-06-23 PROCEDURE — 1101F PT FALLS ASSESS-DOCD LE1/YR: CPT | Mod: CPTII,S$GLB,, | Performed by: NURSE PRACTITIONER

## 2022-06-23 PROCEDURE — 1125F PR PAIN SEVERITY QUANTIFIED, PAIN PRESENT: ICD-10-PCS | Mod: CPTII,S$GLB,, | Performed by: NURSE PRACTITIONER

## 2022-06-23 PROCEDURE — 3288F PR FALLS RISK ASSESSMENT DOCUMENTED: ICD-10-PCS | Mod: CPTII,S$GLB,, | Performed by: NURSE PRACTITIONER

## 2022-06-23 PROCEDURE — 99204 OFFICE O/P NEW MOD 45 MIN: CPT | Mod: S$GLB,,, | Performed by: NURSE PRACTITIONER

## 2022-06-23 PROCEDURE — 3077F PR MOST RECENT SYSTOLIC BLOOD PRESSURE >= 140 MM HG: ICD-10-PCS | Mod: CPTII,S$GLB,, | Performed by: NURSE PRACTITIONER

## 2022-06-23 PROCEDURE — 1159F PR MEDICATION LIST DOCUMENTED IN MEDICAL RECORD: ICD-10-PCS | Mod: CPTII,S$GLB,, | Performed by: NURSE PRACTITIONER

## 2022-06-23 PROCEDURE — 3077F SYST BP >= 140 MM HG: CPT | Mod: CPTII,S$GLB,, | Performed by: NURSE PRACTITIONER

## 2022-06-23 PROCEDURE — 1159F MED LIST DOCD IN RCRD: CPT | Mod: CPTII,S$GLB,, | Performed by: NURSE PRACTITIONER

## 2022-06-23 PROCEDURE — 99999 PR PBB SHADOW E&M-EST. PATIENT-LVL IV: CPT | Mod: PBBFAC,,, | Performed by: NURSE PRACTITIONER

## 2022-06-23 PROCEDURE — 1160F RVW MEDS BY RX/DR IN RCRD: CPT | Mod: CPTII,S$GLB,, | Performed by: NURSE PRACTITIONER

## 2022-06-23 RX ORDER — DICLOFENAC SODIUM 10 MG/G
GEL TOPICAL
COMMUNITY
Start: 2022-01-04

## 2022-06-23 RX ORDER — FLUOXETINE HYDROCHLORIDE 20 MG/1
CAPSULE ORAL
COMMUNITY
Start: 2022-01-10 | End: 2022-12-21 | Stop reason: CLARIF

## 2022-06-23 RX ORDER — CHOLESTYRAMINE 4 G/9G
POWDER, FOR SUSPENSION ORAL
COMMUNITY
Start: 2022-04-21 | End: 2022-06-23

## 2022-06-23 RX ORDER — NYSTATIN 100000 U/G
CREAM TOPICAL
COMMUNITY
Start: 2022-02-16

## 2022-06-23 RX ORDER — CHOLESTYRAMINE 4 G/9G
4 POWDER, FOR SUSPENSION ORAL 2 TIMES DAILY
Qty: 180 PACKET | Refills: 3 | Status: SHIPPED | OUTPATIENT
Start: 2022-06-23 | End: 2023-06-23

## 2022-06-23 NOTE — PROGRESS NOTES
Clinic Consult:  [facility] Consultation Note    Reason for Consult:  The primary encounter diagnosis was Chronic diarrhea. A diagnosis of Generalized abdominal pain was also pertinent to this visit.    PCP: Leif Townsend   No address on file    HPI:  This is a 76 y.o. female here for evaluation of diarrhea.   Onset: 1 year   Stool frequency: 2-3 per day   Stool consistency: watery  Nocturnal diarrhea: yes  Rectal bleeding: no  Associated symptoms: abdominal pain- improves with defecation   Recent travel: no  NSAID use: yes- ASA 81 mg   Recent antibiotic use: no  Prior workup: none   Treatments tried: cholestyramine daily- helps some.   Family hx: negative.     Review of Systems   Constitutional: Negative for fever, malaise/fatigue and weight loss.   HENT: Negative for sore throat.    Respiratory: Negative for cough and wheezing.    Cardiovascular: Negative for chest pain and palpitations.   Gastrointestinal: Positive for abdominal pain and diarrhea. Negative for blood in stool, constipation, heartburn, melena, nausea and vomiting.   Genitourinary: Negative for dysuria and frequency.   Skin: Negative for itching and rash.   Neurological: Negative for dizziness, speech change, seizures, loss of consciousness and headaches.   Psychiatric/Behavioral: Negative for depression and substance abuse. The patient is not nervous/anxious.        Medical History:  has a past medical history of Arthritis, Diabetes, Gout, History of COVID-19 (7/28/2020), HTN (hypertension), Neuropathy, and Obese.    Surgical History:  has a past surgical history that includes Umbilical hernia repair.    Family History: family history includes Diabetes in her sister; Hypertension in her sister..     Social History:  reports that she has never smoked. She has never used smokeless tobacco. She reports that she does not drink alcohol and does not use drugs.    Allergies: Reviewed    Home Medications:   Current Outpatient Medications on  "File Prior to Visit   Medication Sig Dispense Refill    ACCU-CHEK SOFTCLIX LANCETS Misc       acetaminophen (TYLENOL) 500 MG tablet Take 1,000 mg by mouth every 6 (six) hours as needed for Pain.      allopurinoL (ZYLOPRIM) 100 MG tablet allopurinol 100 mg tablet      ascorbic acid, vitamin C, (VITAMIN C) 500 MG tablet Take 500 mg by mouth once daily.      aspirin 81 MG Chew Take 81 mg by mouth once daily.      atorvastatin (LIPITOR) 40 MG tablet atorvastatin 40 mg tablet   Take 1 tablet by mouth daily.      diclofenac sodium (VOLTAREN) 1 % Gel       ergocalciferol (ERGOCALCIFEROL) 50,000 unit Cap Take 1 capsule (50,000 Units total) by mouth every 7 days. 4 capsule 6    FLUoxetine 20 MG capsule       furosemide (LASIX) 40 MG tablet furosemide 40 mg tablet   Take 1 tablet by mouth daily.      gabapentin (NEURONTIN) 100 MG capsule Take 100 mg by mouth 3 (three) times daily.      insulin aspart U-100 (NOVOLOG) 100 unit/mL injection Inject into the skin as needed for High Blood Sugar.      nystatin (MYCOSTATIN) cream       potassium chloride (K-TAB) 20 mEq Take 20 mEq by mouth once daily.      pulse oximeter (PULSE OXIMETER) device by Apply Externally route 2 (two) times a day. Use twice daily at 8 AM and 3 PM and record the value in [employer] as directed. 1 each 0    [DISCONTINUED] cholestyramine (QUESTRAN) 4 gram packet       [DISCONTINUED] sertraline (ZOLOFT) 25 MG tablet Take 25 mg by mouth once daily.       No current facility-administered medications on file prior to visit.       Physical Exam:  BP (!) 140/70 (BP Location: Left arm, Patient Position: Sitting, BP Method: Large (Manual))   Pulse (!) 58   Ht 5' 5" (1.651 m)   Wt 88 kg (194 lb 0.1 oz)   BMI 32.28 kg/m²   Body mass index is 32.28 kg/m².  Physical Exam  Constitutional:       General: She is not in acute distress.     Comments: In wheelchair    HENT:      Head: Normocephalic and atraumatic.   Eyes:      General: No scleral icterus.     " Conjunctiva/sclera: Conjunctivae normal.   Cardiovascular:      Rate and Rhythm: Normal rate and regular rhythm.      Heart sounds: No murmur heard.  Pulmonary:      Effort: Pulmonary effort is normal. No respiratory distress.      Breath sounds: Normal breath sounds. No wheezing.   Abdominal:      General: Abdomen is flat. Bowel sounds are normal.      Palpations: Abdomen is soft.      Tenderness: There is no abdominal tenderness.   Skin:     General: Skin is warm and dry.   Neurological:      General: No focal deficit present.      Mental Status: She is alert and oriented to person, place, and time.      Cranial Nerves: No cranial nerve deficit.   Psychiatric:         Mood and Affect: Mood normal.         Judgment: Judgment normal.         Labs: Pertinent labs reviewed.    Assessment:  1. Chronic diarrhea    2. Generalized abdominal pain        Recommendations:   - xray- external order. Nursing facility to schedule and fax results  - increase cholestyramine to BID    Chronic diarrhea  -     X-Ray Abdomen Flat And Erect; Future; Expected date: 06/23/2022    Generalized abdominal pain    Other orders  -     cholestyramine (QUESTRAN) 4 gram packet; Take 1 packet (4 g total) by mouth 2 (two) times daily.  Dispense: 180 packet; Refill: 3        Follow up if symptoms worsen or fail to improve.    Thank you so much for allowing me to participate in the care of CARMELO Cardona

## 2022-06-24 ENCOUNTER — TELEPHONE (OUTPATIENT)
Dept: GASTROENTEROLOGY | Facility: CLINIC | Age: 77
End: 2022-06-24
Payer: MEDICARE

## 2022-06-24 NOTE — TELEPHONE ENCOUNTER
----- Message from Cass Ragland sent at 6/24/2022 12:31 PM CDT -----  Contact: Ny/ [facility]  Ny, the Director of Nursing, with [facility] is calling to speak with a nurse regarding KUB results. Reports patient previously had a KUB performed with significant results and request to be informed next steps of care for patient. Please give Ms. Alejandra an immediate call back at [phone] as requested.  Thank you,  GH

## 2022-06-24 NOTE — TELEPHONE ENCOUNTER
Returned call to director at [facility] living [facility]. She called to inform of KUB results that was sent via fax. Told her I would check fax es and conform if we received or not .

## 2022-06-24 NOTE — TELEPHONE ENCOUNTER
Received xray results via fax.   Xray findings:  Multiple loops of bowel moderately dilated filled with gas, findings compatible likely with ileus versus obstruction.  Follow-up examination for further evaluation by CT scan is recommended.

## 2022-07-01 ENCOUNTER — OFFICE VISIT (OUTPATIENT)
Dept: SURGERY | Facility: CLINIC | Age: 77
End: 2022-07-01
Payer: MEDICARE

## 2022-07-01 VITALS
WEIGHT: 217 LBS | SYSTOLIC BLOOD PRESSURE: 159 MMHG | HEART RATE: 61 BPM | HEIGHT: 65 IN | TEMPERATURE: 97 F | DIASTOLIC BLOOD PRESSURE: 74 MMHG | BODY MASS INDEX: 36.15 KG/M2

## 2022-07-01 DIAGNOSIS — K43.6 INCARCERATED VENTRAL HERNIA: Primary | ICD-10-CM

## 2022-07-01 PROCEDURE — 3077F SYST BP >= 140 MM HG: CPT | Mod: CPTII,S$GLB,,

## 2022-07-01 PROCEDURE — 3078F DIAST BP <80 MM HG: CPT | Mod: CPTII,S$GLB,,

## 2022-07-01 PROCEDURE — 1126F PR PAIN SEVERITY QUANTIFIED, NO PAIN PRESENT: ICD-10-PCS | Mod: CPTII,S$GLB,,

## 2022-07-01 PROCEDURE — 3078F PR MOST RECENT DIASTOLIC BLOOD PRESSURE < 80 MM HG: ICD-10-PCS | Mod: CPTII,S$GLB,,

## 2022-07-01 PROCEDURE — 1159F MED LIST DOCD IN RCRD: CPT | Mod: CPTII,S$GLB,,

## 2022-07-01 PROCEDURE — 1159F PR MEDICATION LIST DOCUMENTED IN MEDICAL RECORD: ICD-10-PCS | Mod: CPTII,S$GLB,,

## 2022-07-01 PROCEDURE — 99999 PR PBB SHADOW E&M-EST. PATIENT-LVL V: ICD-10-PCS | Mod: PBBFAC,,,

## 2022-07-01 PROCEDURE — 99214 PR OFFICE/OUTPT VISIT, EST, LEVL IV, 30-39 MIN: ICD-10-PCS | Mod: S$GLB,,,

## 2022-07-01 PROCEDURE — 99214 OFFICE O/P EST MOD 30 MIN: CPT | Mod: S$GLB,,,

## 2022-07-01 PROCEDURE — 99999 PR PBB SHADOW E&M-EST. PATIENT-LVL V: CPT | Mod: PBBFAC,,,

## 2022-07-01 PROCEDURE — 1126F AMNT PAIN NOTED NONE PRSNT: CPT | Mod: CPTII,S$GLB,,

## 2022-07-01 PROCEDURE — 3077F PR MOST RECENT SYSTOLIC BLOOD PRESSURE >= 140 MM HG: ICD-10-PCS | Mod: CPTII,S$GLB,,

## 2022-07-01 RX ORDER — SULFAMETHOXAZOLE AND TRIMETHOPRIM 800; 160 MG/1; MG/1
1 TABLET ORAL 2 TIMES DAILY
COMMUNITY

## 2022-07-01 NOTE — PROGRESS NOTES
[facility]  History & Physical    SUBJECTIVE:     History of Present Illness:  Patient is a 76 y.o. female presents for evaluation of recent abdominal pain, an abdominal bulge, and chronic diarrhea. She was recently evaluated by our GI department for this and a KUB was ordered which raised concern for a SBO. She was sent to the ED at [facility] to assess this and underwent a CT which reportedly showed gallbladder sludge and a ventral hernia but ruled out an SBO as she was discharged. She does not have much pain at the hernia site expect when attempting reduction. She denies any right upper quadrant pain, including post-prandial pain. The majority of her abdominal pain is in her lower abdomen. She stays at [facility] house and is mostly wheelchair bound. She denies any recent fevers, chills, nausea, and vomiting.     Chief Complaint   Patient presents with    Hernia     Patient has been having pain in the lower abdomen and has been having excessive diarrhea and feels it's due to the hernia.       Review of patient's allergies indicates:   Allergen Reactions    Strawberries [strawberry] Swelling       Current Outpatient Medications   Medication Sig Dispense Refill    ACCU-CHEK SOFTCLIX LANCETS Misc       allopurinoL (ZYLOPRIM) 100 MG tablet allopurinol 100 mg tablet      aspirin 81 MG Chew Take 81 mg by mouth once daily.      atorvastatin (LIPITOR) 40 MG tablet atorvastatin 40 mg tablet   Take 1 tablet by mouth daily.      camphor-menthol 0.5-0.5% (SARNA) lotion Apply topically as needed (pain).      cholestyramine (QUESTRAN) 4 gram packet Take 1 packet (4 g total) by mouth 2 (two) times daily. 180 packet 3    diclofenac sodium (VOLTAREN) 1 % Gel       ergocalciferol (ERGOCALCIFEROL) 50,000 unit Cap Take 1 capsule (50,000 Units total) by mouth every 7 days. 4 capsule 6    FLUoxetine 20 MG capsule       furosemide (LASIX) 40 MG tablet furosemide 40 mg tablet   Take 1 tablet by mouth daily.      gabapentin  (NEURONTIN) 100 MG capsule Take 100 mg by mouth 3 (three) times daily.      insulin aspart U-100 (NOVOLOG) 100 unit/mL injection Inject into the skin as needed for High Blood Sugar.      nystatin (MYCOSTATIN) cream       potassium chloride (K-TAB) 20 mEq Take 20 mEq by mouth once daily.      pulse oximeter (PULSE OXIMETER) device by Apply Externally route 2 (two) times a day. Use twice daily at 8 AM and 3 PM and [facility] the [facility] as directed. 1 each 0    sulfamethoxazole-trimethoprim 800-160mg (BACTRIM DS) 800-160 mg Tab Take 1 tablet by mouth 2 (two) times daily.      sulfamethoxazole-trimethoprim 800-160mg (BACTRIM DS) 800-160 mg Tab Take 1 tablet by mouth 2 (two) times daily.      acetaminophen (TYLENOL) 500 MG tablet Take 1,000 mg by mouth every 6 (six) hours as needed for Pain.      ascorbic acid, vitamin C, (VITAMIN C) 500 MG tablet Take 500 mg by mouth once daily.       No current facility-administered medications for this visit.       Past Medical History:   Diagnosis Date    Arthritis     Diabetes     Gout     History of COVID-19 7/28/2020    HTN (hypertension)     Neuropathy     Obese      Past Surgical History:   Procedure Laterality Date    UMBILICAL HERNIA REPAIR       Family History   Problem Relation Age of Onset    Diabetes Sister     Hypertension Sister      Social History     Tobacco Use    Smoking status: Never Smoker    Smokeless tobacco: Never Used   Substance Use Topics    Alcohol use: No    Drug use: No        Review of Systems:  Review of Systems   Constitutional: Negative for chills, fatigue, fever and unexpected weight change.   Respiratory: Negative for cough, shortness of breath, wheezing and stridor.    Cardiovascular: Negative for chest pain, palpitations and leg swelling.   Gastrointestinal: Positive for abdominal pain and diarrhea. Negative for abdominal distention, constipation, nausea and vomiting.   Genitourinary: Negative for difficulty urinating,  "dysuria, frequency, hematuria and urgency.   Skin: Negative for color change, pallor, rash and wound.   Hematological: Does not bruise/bleed easily.       OBJECTIVE:     Vital Signs (Most Recent)  Temp: 97 °F (36.1 °C) (07/01/22 0905)  Pulse: 61 (07/01/22 0905)  BP: (!) 159/74 (07/01/22 0905)  5' 5" (1.651 m)  98.4 kg (217 lb)     Physical Exam:  Physical Exam  Vitals reviewed.   Constitutional:       General: She is not in acute distress.     Appearance: Normal appearance. She is well-developed. She is not ill-appearing.   HENT:      Head: Normocephalic and atraumatic.      Right Ear: External ear normal.      Left Ear: External ear normal.   Eyes:      Extraocular Movements: Extraocular movements intact.      Conjunctiva/sclera: Conjunctivae normal.   Cardiovascular:      Rate and Rhythm: Normal rate.   Pulmonary:      Effort: Pulmonary effort is normal. No respiratory distress.   Abdominal:      General: There is no distension.      Palpations: Abdomen is soft.      Tenderness: There is abdominal tenderness (At hernia site when attempting reduction).      Hernia: A hernia (Incarcerated, minimal TTP. No erythema or evidence of strangulation.) is present.   Musculoskeletal:      Cervical back: Neck supple.   Skin:     General: Skin is warm and dry.   Neurological:      Mental Status: She is alert and oriented to person, place, and time.   Psychiatric:         Behavior: Behavior normal.       Diagnostic Results:  Unable to review BRG films at this time    ASSESSMENT/PLAN:     77 y/o female with incarcerated ventral hernia and asymptomatic gallbladder sludge per patient records.     - Discussed need for potential robotic ventral hernia repair in the future. She is amenable and would like to discuss this further with her family.  - Will start pre-operative work-up with CBC, CMP, EKG, CXR  - Asymptomatic in regards to biliary symptoms. Educated on biliary symptoms and advised to let us know should these occur.  - " Educated on ED precautions for SBO and hernia strangulation. She and her caregiver voiced understanding.    Lary Centeno PA-C  [facility]      I spent a total of 45 minutes on the day of the visit.This includes face to face time and non-face to face time preparing to see the patient (eg, review of tests), obtaining and/or reviewing separately obtained history, documenting clinical information in the electronic or other health record, independently interpreting results and communicating results to the patient/family/caregiver, or care coordinator.

## 2022-07-15 ENCOUNTER — OFFICE VISIT (OUTPATIENT)
Dept: SURGERY | Facility: CLINIC | Age: 77
End: 2022-07-15
Payer: MEDICARE

## 2022-07-15 VITALS
SYSTOLIC BLOOD PRESSURE: 156 MMHG | TEMPERATURE: 98 F | HEIGHT: 65 IN | HEART RATE: 60 BPM | DIASTOLIC BLOOD PRESSURE: 87 MMHG | BODY MASS INDEX: 36.15 KG/M2 | WEIGHT: 217 LBS

## 2022-07-15 DIAGNOSIS — R19.7 WATERY DIARRHEA: ICD-10-CM

## 2022-07-15 DIAGNOSIS — K43.9 VENTRAL HERNIA WITHOUT OBSTRUCTION OR GANGRENE: Primary | ICD-10-CM

## 2022-07-15 PROCEDURE — 1159F MED LIST DOCD IN RCRD: CPT | Mod: CPTII,S$GLB,, | Performed by: SURGERY

## 2022-07-15 PROCEDURE — 3079F DIAST BP 80-89 MM HG: CPT | Mod: CPTII,S$GLB,, | Performed by: SURGERY

## 2022-07-15 PROCEDURE — 3079F PR MOST RECENT DIASTOLIC BLOOD PRESSURE 80-89 MM HG: ICD-10-PCS | Mod: CPTII,S$GLB,, | Performed by: SURGERY

## 2022-07-15 PROCEDURE — 99213 OFFICE O/P EST LOW 20 MIN: CPT | Mod: S$GLB,,, | Performed by: SURGERY

## 2022-07-15 PROCEDURE — 1159F PR MEDICATION LIST DOCUMENTED IN MEDICAL RECORD: ICD-10-PCS | Mod: CPTII,S$GLB,, | Performed by: SURGERY

## 2022-07-15 PROCEDURE — 1125F AMNT PAIN NOTED PAIN PRSNT: CPT | Mod: CPTII,S$GLB,, | Performed by: SURGERY

## 2022-07-15 PROCEDURE — 1125F PR PAIN SEVERITY QUANTIFIED, PAIN PRESENT: ICD-10-PCS | Mod: CPTII,S$GLB,, | Performed by: SURGERY

## 2022-07-15 PROCEDURE — 99999 PR PBB SHADOW E&M-EST. PATIENT-LVL III: CPT | Mod: PBBFAC,,, | Performed by: SURGERY

## 2022-07-15 PROCEDURE — 3077F PR MOST RECENT SYSTOLIC BLOOD PRESSURE >= 140 MM HG: ICD-10-PCS | Mod: CPTII,S$GLB,, | Performed by: SURGERY

## 2022-07-15 PROCEDURE — 3077F SYST BP >= 140 MM HG: CPT | Mod: CPTII,S$GLB,, | Performed by: SURGERY

## 2022-07-15 PROCEDURE — 99213 PR OFFICE/OUTPT VISIT, EST, LEVL III, 20-29 MIN: ICD-10-PCS | Mod: S$GLB,,, | Performed by: SURGERY

## 2022-07-15 PROCEDURE — 99999 PR PBB SHADOW E&M-EST. PATIENT-LVL III: ICD-10-PCS | Mod: PBBFAC,,, | Performed by: SURGERY

## 2022-07-18 ENCOUNTER — LAB VISIT (OUTPATIENT)
Dept: LAB | Facility: HOSPITAL | Age: 77
End: 2022-07-18
Payer: MEDICARE

## 2022-07-18 DIAGNOSIS — R19.7 WATERY DIARRHEA: ICD-10-CM

## 2022-07-18 PROCEDURE — 87449 NOS EACH ORGANISM AG IA: CPT | Performed by: SURGERY

## 2022-07-18 PROCEDURE — 87493 C DIFF AMPLIFIED PROBE: CPT | Performed by: SURGERY

## 2022-07-19 LAB
C DIFF GDH STL QL: POSITIVE
C DIFF TOX A+B STL QL IA: NEGATIVE
C DIFF TOX GENS STL QL NAA+PROBE: NEGATIVE

## 2022-11-15 NOTE — PROGRESS NOTES
[facility]  General Surgery History & Physical    SUBJECTIVE:     History of Present Illness:  Patient is a 76 y.o. female presents for evaluation of recent abdominal pain, an abdominal bulge, and chronic diarrhea. She was recently evaluated by our GI department for this and a KUB was ordered which raised concern for a SBO. She was sent to the ED at [facility] to assess this and underwent a CT which reportedly showed gallbladder sludge and a ventral hernia but ruled out an SBO as she was discharged. She does not have much pain at the hernia site expect when attempting reduction. She denies any right upper quadrant pain, including post-prandial pain. The majority of her abdominal pain is in her lower abdomen. She stays at [facility] house and is mostly wheelchair bound. She denies any recent fevers, chills, nausea, and vomiting. History of umbilical hernia repair.    She would like to discuss possible surgery for the hernia.      Review of patient's allergies indicates:   Allergen Reactions    Strawberries [strawberry] Swelling       Current Outpatient Medications   Medication Sig Dispense Refill    ACCU-CHEK SOFTCLIX LANCETS Misc       acetaminophen (TYLENOL) 500 MG tablet Take 1,000 mg by mouth every 6 (six) hours as needed for Pain.      allopurinoL (ZYLOPRIM) 100 MG tablet allopurinol 100 mg tablet      ascorbic acid, vitamin C, (VITAMIN C) 500 MG tablet Take 500 mg by mouth once daily.      aspirin 81 MG Chew Take 81 mg by mouth once daily.      atorvastatin (LIPITOR) 40 MG tablet atorvastatin 40 mg tablet   Take 1 tablet by mouth daily.      camphor-menthol 0.5-0.5% (SARNA) lotion Apply topically as needed (pain).      cholestyramine (QUESTRAN) 4 gram packet Take 1 packet (4 g total) by mouth 2 (two) times daily. 180 packet 3    diclofenac sodium (VOLTAREN) 1 % Gel       ergocalciferol (ERGOCALCIFEROL) 50,000 unit Cap Take 1 capsule (50,000 Units total) by mouth every 7 days. 4 capsule 6    FLUoxetine 20  "MG capsule       furosemide (LASIX) 40 MG tablet furosemide 40 mg tablet   Take 1 tablet by mouth daily.      gabapentin (NEURONTIN) 100 MG capsule Take 100 mg by mouth 3 (three) times daily.      insulin aspart U-100 (NOVOLOG) 100 unit/mL injection Inject into the skin as needed for High Blood Sugar.      nystatin (MYCOSTATIN) cream       potassium chloride (K-TAB) 20 mEq Take 20 mEq by mouth once daily.      pulse oximeter (PULSE OXIMETER) device by Apply Externally route 2 (two) times a day. Use twice daily at 8 AM and 3 PM and record the value in [employer] as directed. 1 each 0    sulfamethoxazole-trimethoprim 800-160mg (BACTRIM DS) 800-160 mg Tab Take 1 tablet by mouth 2 (two) times daily.      sulfamethoxazole-trimethoprim 800-160mg (BACTRIM DS) 800-160 mg Tab Take 1 tablet by mouth 2 (two) times daily.       No current facility-administered medications for this visit.       Past Medical History:   Diagnosis Date    Arthritis     Diabetes     Gout     History of COVID-19 7/28/2020    HTN (hypertension)     Neuropathy     Obese      Past Surgical History:   Procedure Laterality Date    UMBILICAL HERNIA REPAIR       Family History   Problem Relation Age of Onset    Diabetes Sister     Hypertension Sister      Social History     Tobacco Use    Smoking status: Never    Smokeless tobacco: Never   Substance Use Topics    Alcohol use: No    Drug use: No        Review of Systems:  Review of Systems   Gastrointestinal:  Positive for diarrhea.     OBJECTIVE:     Vital Signs (Most Recent)  Temp: 97.6 °F (36.4 °C) (07/15/22 1142)  Pulse: 60 (07/15/22 1142)  BP: (!) 156/87 (07/15/22 1142)  5' 5" (1.651 m)  98.4 kg (217 lb)     Physical Exam:  Physical Exam  Vitals and nursing note reviewed.   Constitutional:       Comments: frail   HENT:      Head: Normocephalic and atraumatic.      Nose: Nose normal.   Eyes:      General: No scleral icterus.        Right eye: No discharge.         Left eye: No discharge.      Extraocular " Movements: Extraocular movements intact.   Cardiovascular:      Rate and Rhythm: Normal rate and regular rhythm.   Pulmonary:      Effort: No respiratory distress.      Breath sounds: No stridor.   Abdominal:      Palpations: Abdomen is soft.      Hernia: A hernia is present.      Comments: Supraumbilical ventral hernia with minimal tenderness. Reducible.   Musculoskeletal:      Cervical back: No rigidity.   Skin:     General: Skin is warm and dry.      Coloration: Skin is not jaundiced.   Neurological:      Mental Status: She is alert and oriented to person, place, and time.   Psychiatric:         Mood and Affect: Mood normal.         Behavior: Behavior normal.       Laboratory      Diagnostic Results:      ASSESSMENT/PLAN:     Dilia was seen today for pre-op exam.    Diagnoses and all orders for this visit:    Ventral hernia without obstruction or gangrene    Watery diarrhea  -     Cancel: CLOSTRIDIUM DIFFICILE; Future  -     CLOSTRIDIUM DIFFICILE; Future       Patient is unsure about surgery, especially since she is high risk given her frail condition, nutritional status, prior hernia surgery, and comorbidities. She states that since the hernia doesn't really bother her on the day to day, she would rather monitor it.  We discussed signs and symptoms to look for if the hernia becomes strangulated or causes and obstruction and to immediately go to the ER.    Check C diff due to watery diarrhea.    Patient expressed understanding and is in agreement.      Amada Sanchez, DO  General Surgery  [facility]

## 2022-12-21 ENCOUNTER — HOSPITAL ENCOUNTER (OUTPATIENT)
Dept: RADIOLOGY | Facility: HOSPITAL | Age: 77
Discharge: HOME | End: 2022-12-21
Attending: NURSE PRACTITIONER
Payer: MEDICARE

## 2022-12-21 ENCOUNTER — OFFICE VISIT (OUTPATIENT)
Dept: GASTROENTEROLOGY | Facility: CLINIC | Age: 77
End: 2022-12-21
Payer: MEDICARE

## 2022-12-21 ENCOUNTER — TELEPHONE (OUTPATIENT)
Dept: GASTROENTEROLOGY | Facility: CLINIC | Age: 77
End: 2022-12-21

## 2022-12-21 VITALS
BODY MASS INDEX: 38.42 KG/M2 | HEIGHT: 65 IN | WEIGHT: 230.63 LBS | HEART RATE: 61 BPM | DIASTOLIC BLOOD PRESSURE: 72 MMHG | SYSTOLIC BLOOD PRESSURE: 118 MMHG

## 2022-12-21 DIAGNOSIS — R93.89 ABNORMAL X-RAY: ICD-10-CM

## 2022-12-21 DIAGNOSIS — R15.9 INCONTINENCE OF FECES, UNSPECIFIED FECAL INCONTINENCE TYPE: ICD-10-CM

## 2022-12-21 DIAGNOSIS — K52.9 CHRONIC DIARRHEA: Primary | ICD-10-CM

## 2022-12-21 DIAGNOSIS — K52.9 CHRONIC DIARRHEA: ICD-10-CM

## 2022-12-21 PROCEDURE — 74019 XR ABDOMEN FLAT AND ERECT: ICD-10-PCS | Mod: 26,HCNC,, | Performed by: STUDENT IN AN ORGANIZED HEALTH CARE EDUCATION/TRAINING PROGRAM

## 2022-12-21 PROCEDURE — 1101F PT FALLS ASSESS-DOCD LE1/YR: CPT | Mod: HCNC,CPTII,S$GLB, | Performed by: NURSE PRACTITIONER

## 2022-12-21 PROCEDURE — 3288F PR FALLS RISK ASSESSMENT DOCUMENTED: ICD-10-PCS | Mod: HCNC,CPTII,S$GLB, | Performed by: NURSE PRACTITIONER

## 2022-12-21 PROCEDURE — 99214 PR OFFICE/OUTPT VISIT, EST, LEVL IV, 30-39 MIN: ICD-10-PCS | Mod: HCNC,S$GLB,, | Performed by: NURSE PRACTITIONER

## 2022-12-21 PROCEDURE — 1126F PR PAIN SEVERITY QUANTIFIED, NO PAIN PRESENT: ICD-10-PCS | Mod: HCNC,CPTII,S$GLB, | Performed by: NURSE PRACTITIONER

## 2022-12-21 PROCEDURE — 74019 RADEX ABDOMEN 2 VIEWS: CPT | Mod: 26,HCNC,, | Performed by: STUDENT IN AN ORGANIZED HEALTH CARE EDUCATION/TRAINING PROGRAM

## 2022-12-21 PROCEDURE — 1101F PR PT FALLS ASSESS DOC 0-1 FALLS W/OUT INJ PAST YR: ICD-10-PCS | Mod: HCNC,CPTII,S$GLB, | Performed by: NURSE PRACTITIONER

## 2022-12-21 PROCEDURE — 1126F AMNT PAIN NOTED NONE PRSNT: CPT | Mod: HCNC,CPTII,S$GLB, | Performed by: NURSE PRACTITIONER

## 2022-12-21 PROCEDURE — 99999 PR PBB SHADOW E&M-EST. PATIENT-LVL IV: CPT | Mod: PBBFAC,HCNC,, | Performed by: NURSE PRACTITIONER

## 2022-12-21 PROCEDURE — 74019 RADEX ABDOMEN 2 VIEWS: CPT | Mod: TC,HCNC

## 2022-12-21 PROCEDURE — 3078F PR MOST RECENT DIASTOLIC BLOOD PRESSURE < 80 MM HG: ICD-10-PCS | Mod: HCNC,CPTII,S$GLB, | Performed by: NURSE PRACTITIONER

## 2022-12-21 PROCEDURE — 3074F SYST BP LT 130 MM HG: CPT | Mod: HCNC,CPTII,S$GLB, | Performed by: NURSE PRACTITIONER

## 2022-12-21 PROCEDURE — 1160F PR REVIEW ALL MEDS BY PRESCRIBER/CLIN PHARMACIST DOCUMENTED: ICD-10-PCS | Mod: HCNC,CPTII,S$GLB, | Performed by: NURSE PRACTITIONER

## 2022-12-21 PROCEDURE — 1160F RVW MEDS BY RX/DR IN RCRD: CPT | Mod: HCNC,CPTII,S$GLB, | Performed by: NURSE PRACTITIONER

## 2022-12-21 PROCEDURE — 3288F FALL RISK ASSESSMENT DOCD: CPT | Mod: HCNC,CPTII,S$GLB, | Performed by: NURSE PRACTITIONER

## 2022-12-21 PROCEDURE — 3078F DIAST BP <80 MM HG: CPT | Mod: HCNC,CPTII,S$GLB, | Performed by: NURSE PRACTITIONER

## 2022-12-21 PROCEDURE — 1159F PR MEDICATION LIST DOCUMENTED IN MEDICAL RECORD: ICD-10-PCS | Mod: HCNC,CPTII,S$GLB, | Performed by: NURSE PRACTITIONER

## 2022-12-21 PROCEDURE — 99999 PR PBB SHADOW E&M-EST. PATIENT-LVL IV: ICD-10-PCS | Mod: PBBFAC,HCNC,, | Performed by: NURSE PRACTITIONER

## 2022-12-21 PROCEDURE — 1159F MED LIST DOCD IN RCRD: CPT | Mod: HCNC,CPTII,S$GLB, | Performed by: NURSE PRACTITIONER

## 2022-12-21 PROCEDURE — 99214 OFFICE O/P EST MOD 30 MIN: CPT | Mod: HCNC,S$GLB,, | Performed by: NURSE PRACTITIONER

## 2022-12-21 PROCEDURE — 3074F PR MOST RECENT SYSTOLIC BLOOD PRESSURE < 130 MM HG: ICD-10-PCS | Mod: HCNC,CPTII,S$GLB, | Performed by: NURSE PRACTITIONER

## 2022-12-21 RX ORDER — TOBRAMYCIN 3 MG/ML
SOLUTION/ DROPS OPHTHALMIC
COMMUNITY

## 2022-12-21 RX ORDER — SERTRALINE HYDROCHLORIDE 25 MG/1
25 TABLET, FILM COATED ORAL
COMMUNITY
Start: 2022-12-13

## 2022-12-21 RX ORDER — PEN NEEDLE, DIABETIC, SAFETY 30 GX3/16"
NEEDLE, DISPOSABLE MISCELLANEOUS
COMMUNITY
Start: 2022-12-08

## 2022-12-21 NOTE — PROGRESS NOTES
Clinic Follow Up:  [facility] Follow Up Note    Reason for Follow Up:  The primary encounter diagnosis was Chronic diarrhea. A diagnosis of Incontinence of feces, unspecified fecal incontinence type was also pertinent to this visit.    PCP: Leif Townsend       HPI:  This is a 77 y.o. female here for follow up of diarrhea.   [facility]  She continues with diarrhea.  She is having liquid stool throughout the day.  She wears adult briefs as she has mobility issues and is not able to transfer to the toilet or bedside commode.  She reports that the stool comes out when she is transferring from sitting to standing.    Review of Systems   Constitutional:  Negative for activity change and appetite change.        As per interval history above   Respiratory:  Negative for cough and shortness of breath.    Cardiovascular:  Negative for chest pain.   Gastrointestinal:  Positive for diarrhea. Negative for abdominal distention, abdominal pain, anal bleeding, blood in stool, constipation, nausea, rectal pain and vomiting.   Skin:  Negative for color change and rash.     Medical History:  Past Medical History:   Diagnosis Date    Arthritis     Diabetes     Gout     History of COVID-19 7/28/2020    HTN (hypertension)     Neuropathy     Obese        Surgical History:   Past Surgical History:   Procedure Laterality Date    UMBILICAL HERNIA REPAIR         Family History:   Family History   Problem Relation Age of Onset    Diabetes Sister     Hypertension Sister        Social History:   Social History     Tobacco Use    Smoking status: Never    Smokeless tobacco: Never   Substance Use Topics    Alcohol use: No    Drug use: No       Allergies:   Review of patient's allergies indicates:   Allergen Reactions    Strawberries [strawberry] Swelling       Home Medications:  Current Outpatient Medications on File Prior to Visit   Medication Sig Dispense Refill    ACCU-CHEK SOFTCLIX  "LANCETS Misc       acetaminophen (TYLENOL) 500 MG tablet Take 1,000 mg by mouth every 6 (six) hours as needed for Pain.      allopurinoL (ZYLOPRIM) 100 MG tablet allopurinol 100 mg tablet      ascorbic acid, vitamin C, (VITAMIN C) 500 MG tablet Take 500 mg by mouth once daily.      aspirin 81 MG Chew Take 81 mg by mouth once daily.      atorvastatin (LIPITOR) 40 MG tablet atorvastatin 40 mg tablet   Take 1 tablet by mouth daily.      BD AUTOSHIELD DUO PEN NEEDLE 30 gauge x 3/16" Ndle       camphor-menthol 0.5-0.5% (SARNA) lotion Apply topically as needed (pain).      cholestyramine (QUESTRAN) 4 gram packet Take 1 packet (4 g total) by mouth 2 (two) times daily. 180 packet 3    diclofenac sodium (VOLTAREN) 1 % Gel       ergocalciferol (ERGOCALCIFEROL) 50,000 unit Cap Take 1 capsule (50,000 Units total) by mouth every 7 days. 4 capsule 6    furosemide (LASIX) 40 MG tablet furosemide 40 mg tablet   Take 1 tablet by mouth daily.      gabapentin (NEURONTIN) 100 MG capsule Take 100 mg by mouth 3 (three) times daily.      insulin aspart U-100 (NOVOLOG) 100 unit/mL injection Inject into the skin as needed for High Blood Sugar.      nystatin (MYCOSTATIN) cream       potassium chloride (K-TAB) 20 mEq Take 20 mEq by mouth once daily.      pulse oximeter (PULSE OXIMETER) device by Apply Externally route 2 (two) times a day. Use twice daily at 8 AM and 3 PM and record the value in [employer] as directed. 1 each 0    sertraline (ZOLOFT) 25 MG tablet Take 25 mg by mouth.      tobramycin sulfate 0.3% (TOBREX) 0.3 % ophthalmic solution Apply to eye.      sulfamethoxazole-trimethoprim 800-160mg (BACTRIM DS) 800-160 mg Tab Take 1 tablet by mouth 2 (two) times daily.      sulfamethoxazole-trimethoprim 800-160mg (BACTRIM DS) 800-160 mg Tab Take 1 tablet by mouth 2 (two) times daily.      [DISCONTINUED] FLUoxetine 20 MG capsule        No current facility-administered medications on file prior to visit.       /72 (BP Location: Left " "arm, Patient Position: Sitting, BP Method: Medium (Manual))   Pulse 61   Ht 5' 5" (1.651 m)   Wt 104.6 kg (230 lb 9.6 oz)   BMI 38.37 kg/m²   Body mass index is 38.37 kg/m².  Physical Exam  Constitutional:       General: She is not in acute distress.     Comments: In wheelchair    HENT:      Head: Normocephalic and atraumatic.   Eyes:      General: No scleral icterus.     Conjunctiva/sclera: Conjunctivae normal.   Cardiovascular:      Rate and Rhythm: Normal rate and regular rhythm.      Heart sounds: Normal heart sounds.   Pulmonary:      Effort: Pulmonary effort is normal. No respiratory distress.      Breath sounds: Normal breath sounds.   Abdominal:      General: Bowel sounds are decreased.      Tenderness: There is no abdominal tenderness.   Skin:     General: Skin is warm and dry.      Findings: No rash.   Neurological:      General: No focal deficit present.      Mental Status: She is alert and oriented to person, place, and time.   Psychiatric:         Mood and Affect: Mood normal.         Behavior: Behavior normal. Behavior is cooperative.       Labs: Pertinent labs reviewed.  CRC Screening: NA    Assessment:   1. Chronic diarrhea    2. Incontinence of feces, unspecified fecal incontinence type      Concerns for constipation with overflow diarrhea with decreased bowel sounds and immobility.    Recommendations:   Xray today- recommendations to follow.   Will send orders and notes to [facility].     Chronic diarrhea  -     X-Ray Abdomen Flat And Erect; Future; Expected date: 12/21/2022    Incontinence of feces, unspecified fecal incontinence type      Return to Clinic:  Follow up if symptoms worsen or fail to improve.    Thank you for the opportunity to participate in the care of this patient.  CARMELO Rodriguez        "

## 2022-12-29 ENCOUNTER — HOSPITAL ENCOUNTER (OUTPATIENT)
Dept: RADIOLOGY | Facility: HOSPITAL | Age: 77
Discharge: HOME | End: 2022-12-29
Attending: NURSE PRACTITIONER
Payer: MEDICARE

## 2022-12-29 DIAGNOSIS — R93.89 ABNORMAL X-RAY: ICD-10-CM

## 2022-12-29 PROCEDURE — 25500020 PHARM REV CODE 255: Mod: HCNC | Performed by: NURSE PRACTITIONER

## 2022-12-29 PROCEDURE — 74177 CT ABD & PELVIS W/CONTRAST: CPT | Mod: 26,HCNC,, | Performed by: STUDENT IN AN ORGANIZED HEALTH CARE EDUCATION/TRAINING PROGRAM

## 2022-12-29 PROCEDURE — 74177 CT ABDOMEN PELVIS WITH CONTRAST: ICD-10-PCS | Mod: 26,HCNC,, | Performed by: STUDENT IN AN ORGANIZED HEALTH CARE EDUCATION/TRAINING PROGRAM

## 2022-12-29 PROCEDURE — 74177 CT ABD & PELVIS W/CONTRAST: CPT | Mod: TC,HCNC

## 2022-12-29 RX ADMIN — IOHEXOL 30 ML: 350 INJECTION, SOLUTION INTRAVENOUS at 01:12

## 2022-12-29 RX ADMIN — IOHEXOL 100 ML: 350 INJECTION, SOLUTION INTRAVENOUS at 02:12

## 2023-03-22 ENCOUNTER — PATIENT OUTREACH (OUTPATIENT)
Dept: ADMINISTRATIVE | Facility: HOSPITAL | Age: 78
End: 2023-03-22
Payer: MEDICARE

## 2023-07-27 ENCOUNTER — PES CALL (OUTPATIENT)
Dept: ADMINISTRATIVE | Facility: CLINIC | Age: 78
End: 2023-07-27
Payer: MEDICARE

## 2023-08-03 NOTE — PLAN OF CARE
POC discussed with patient, verbalized understanding.   NSR to sinus tre on monitor. Heart rate by the end of shift remained in low 30s-40s. Patient remained asymptomatic. Was given an albuterol breathing treatment.   VSS.   Alert and oriented to person. Requires frequent reorienting to time, place and situation..   Voids per hammonds catheter.   Turns with assistance in bed.   Fall precautions in place.   Side rails up X2.   Avasys in place for patient safety.   Call bell on, working and within reach.   Bed locked in low position.   Remains free from falls/injuries.   Denies needs at this time.   Will continue to monitor.       88

## 2024-02-14 ENCOUNTER — TELEPHONE (OUTPATIENT)
Dept: OPHTHALMOLOGY | Facility: CLINIC | Age: 79
End: 2024-02-14
Payer: MEDICAID

## 2024-02-14 NOTE — TELEPHONE ENCOUNTER
Called Chari back. Explained I would forward message to  Hemalatha martinez/ Dr Bueno to schedule cat eval. She voiced understanding. SRB    ----- Message from Jeff Tirado sent at 2/14/2024 11:09 AM CST -----  Contact: [phone] Chari  Pt assisted living is calling to schedule a Cat Eval for the Pt. Please call back to further assist.

## 2024-02-22 ENCOUNTER — TELEPHONE (OUTPATIENT)
Dept: OPHTHALMOLOGY | Facility: CLINIC | Age: 79
End: 2024-02-22
Payer: MEDICAID

## 2024-02-22 NOTE — TELEPHONE ENCOUNTER
Spoke with Chari, I stated Dr. Bueno's staff was gone for the day and that I would reach out to let them know Ms Talley is ready to schedule for her cat eval and that Lynn would give her a phone call back to schedule. Chari verbally agreed.    ----- Message from Natty Ascencio sent at 2/22/2024  4:35 PM CST -----  Contact: Chari/Crumrod Guest Oto  Chari is calling in regards to getting an appt.please call back at [phone]          Thanks  HENRY

## 2024-05-30 ENCOUNTER — OFFICE VISIT (OUTPATIENT)
Dept: OPHTHALMOLOGY | Facility: CLINIC | Age: 79
End: 2024-05-30
Payer: MEDICAID

## 2024-05-30 DIAGNOSIS — Z96.1 PSEUDOPHAKIA OF BOTH EYES: ICD-10-CM

## 2024-05-30 DIAGNOSIS — H26.492 POSTERIOR CAPSULAR OPACIFICATION, LEFT EYE: ICD-10-CM

## 2024-05-30 DIAGNOSIS — H26.491 POSTERIOR CAPSULAR OPACIFICATION, RIGHT EYE: Primary | ICD-10-CM

## 2024-05-30 DIAGNOSIS — H35.372 EPIRETINAL MEMBRANE (ERM) OF LEFT EYE: ICD-10-CM

## 2024-05-30 PROCEDURE — 66821 AFTER CATARACT LASER SURGERY: CPT | Mod: 50,S$PBB,, | Performed by: STUDENT IN AN ORGANIZED HEALTH CARE EDUCATION/TRAINING PROGRAM

## 2024-05-30 PROCEDURE — 92134 CPTRZ OPH DX IMG PST SGM RTA: CPT | Mod: PBBFAC | Performed by: STUDENT IN AN ORGANIZED HEALTH CARE EDUCATION/TRAINING PROGRAM

## 2024-05-30 PROCEDURE — 99204 OFFICE O/P NEW MOD 45 MIN: CPT | Mod: 57,S$PBB,, | Performed by: STUDENT IN AN ORGANIZED HEALTH CARE EDUCATION/TRAINING PROGRAM

## 2024-05-30 PROCEDURE — 66821 AFTER CATARACT LASER SURGERY: CPT | Mod: PBBFAC | Performed by: STUDENT IN AN ORGANIZED HEALTH CARE EDUCATION/TRAINING PROGRAM

## 2024-05-30 PROCEDURE — 1159F MED LIST DOCD IN RCRD: CPT | Mod: CPTII,,, | Performed by: STUDENT IN AN ORGANIZED HEALTH CARE EDUCATION/TRAINING PROGRAM

## 2024-05-30 PROCEDURE — 99213 OFFICE O/P EST LOW 20 MIN: CPT | Mod: PBBFAC,25 | Performed by: STUDENT IN AN ORGANIZED HEALTH CARE EDUCATION/TRAINING PROGRAM

## 2024-05-30 PROCEDURE — 1160F RVW MEDS BY RX/DR IN RCRD: CPT | Mod: CPTII,,, | Performed by: STUDENT IN AN ORGANIZED HEALTH CARE EDUCATION/TRAINING PROGRAM

## 2024-05-30 PROCEDURE — 99999 PR PBB SHADOW E&M-EST. PATIENT-LVL III: CPT | Mod: PBBFAC,,, | Performed by: STUDENT IN AN ORGANIZED HEALTH CARE EDUCATION/TRAINING PROGRAM

## 2024-05-30 RX ORDER — PREDNISOLONE ACETATE 10 MG/ML
1 SUSPENSION/ DROPS OPHTHALMIC 4 TIMES DAILY
Qty: 5 ML | Refills: 0 | Status: SHIPPED | OUTPATIENT
Start: 2024-05-30 | End: 2024-06-06

## 2024-05-30 NOTE — PROGRESS NOTES
HPI     Cataract     Additional comments: Pt reports for cat eval. Denies any pain or   irritation. Va stable. 100% compliant with gtts.           Last edited by Dharmesh Moulton on 5/30/2024 10:27 AM.            Assessment /Plan     For exam results, see Encounter Report.    Posterior capsular opacification, right eye  Posterior capsular opacification, left eye  Yag Operative Procedure Note    78 y.o. year old patient experiencing a symptomatic decrease in vision OU with inability to perform activities of daily living including reading.      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 Including risks of retinal detachment (1-3%), macular edema, dislocated implant, pain, inflammation elevated intraocular pressure and vision loss. Consent signed.  Time out procedure form completed prior to laser.    Medications given:  Tetracaine    Laser energy settings:    5 energy per shot OD  23 bursts OD    3 energy per shot OS   21 bursts OS     1 to 1 ratio per shot     Central capsular opening created without difficulty.    Start PF QID to operated eye for 7 days then stop    RTC 4 weeks for DFE and Mrx    Pseudophakia of both eyes  Monitor     Epiretinal membrane (ERM) of left eye  Monitor w/ Mocts

## 2024-06-28 ENCOUNTER — OFFICE VISIT (OUTPATIENT)
Dept: OPHTHALMOLOGY | Facility: CLINIC | Age: 79
End: 2024-06-28
Payer: MEDICAID

## 2024-06-28 DIAGNOSIS — H35.372 EPIRETINAL MEMBRANE (ERM) OF LEFT EYE: ICD-10-CM

## 2024-06-28 DIAGNOSIS — Z96.1 PSEUDOPHAKIA OF BOTH EYES: Primary | ICD-10-CM

## 2024-06-28 DIAGNOSIS — H52.7 REFRACTIVE DISORDER: ICD-10-CM

## 2024-06-28 PROCEDURE — 99213 OFFICE O/P EST LOW 20 MIN: CPT | Mod: PBBFAC | Performed by: STUDENT IN AN ORGANIZED HEALTH CARE EDUCATION/TRAINING PROGRAM

## 2024-06-28 PROCEDURE — 99999 PR PBB SHADOW E&M-EST. PATIENT-LVL III: CPT | Mod: PBBFAC,,, | Performed by: STUDENT IN AN ORGANIZED HEALTH CARE EDUCATION/TRAINING PROGRAM

## 2024-06-28 NOTE — PROGRESS NOTES
HPI     Follow-up     Additional comments: 1 mo po YAG OU   Pt very satisfied w/ Yag  OD     Pt states they have been administered pred every day since last visit.   Gtts not listed on order. Pt not sure of drop color or name and states its   about bid to tid           Last edited by Marco Miranda on 6/28/2024  9:17 AM.            Assessment /Plan     For exam results, see Encounter Report.    Pseudophakia of both eyes- Follow    Epiretinal membrane (ERM) of left eye- Follow    Refractive disorder- MRx dispensed      Return to clinic in 2-3 years or PRN